# Patient Record
Sex: MALE | Race: WHITE | NOT HISPANIC OR LATINO
[De-identification: names, ages, dates, MRNs, and addresses within clinical notes are randomized per-mention and may not be internally consistent; named-entity substitution may affect disease eponyms.]

---

## 2017-01-17 ENCOUNTER — APPOINTMENT (OUTPATIENT)
Dept: PULMONOLOGY | Facility: CLINIC | Age: 82
End: 2017-01-17

## 2017-02-08 ENCOUNTER — APPOINTMENT (OUTPATIENT)
Dept: OPHTHALMOLOGY | Facility: CLINIC | Age: 82
End: 2017-02-08

## 2017-05-25 ENCOUNTER — APPOINTMENT (OUTPATIENT)
Dept: PULMONOLOGY | Facility: CLINIC | Age: 82
End: 2017-05-25

## 2017-05-25 VITALS
DIASTOLIC BLOOD PRESSURE: 72 MMHG | SYSTOLIC BLOOD PRESSURE: 128 MMHG | HEART RATE: 70 BPM | HEIGHT: 63 IN | WEIGHT: 195 LBS | OXYGEN SATURATION: 96 % | BODY MASS INDEX: 34.55 KG/M2

## 2017-10-24 ENCOUNTER — APPOINTMENT (OUTPATIENT)
Dept: PULMONOLOGY | Facility: CLINIC | Age: 82
End: 2017-10-24
Payer: MEDICARE

## 2017-10-24 VITALS
WEIGHT: 190 LBS | DIASTOLIC BLOOD PRESSURE: 78 MMHG | OXYGEN SATURATION: 91 % | HEIGHT: 63 IN | SYSTOLIC BLOOD PRESSURE: 126 MMHG | BODY MASS INDEX: 33.66 KG/M2 | HEART RATE: 85 BPM

## 2017-10-24 PROCEDURE — 94060 EVALUATION OF WHEEZING: CPT

## 2017-10-24 PROCEDURE — 99214 OFFICE O/P EST MOD 30 MIN: CPT | Mod: 25

## 2017-10-24 PROCEDURE — 94727 GAS DIL/WSHOT DETER LNG VOL: CPT

## 2017-10-24 PROCEDURE — 94729 DIFFUSING CAPACITY: CPT

## 2017-11-30 ENCOUNTER — APPOINTMENT (OUTPATIENT)
Dept: PULMONOLOGY | Facility: CLINIC | Age: 82
End: 2017-11-30

## 2018-05-08 ENCOUNTER — APPOINTMENT (OUTPATIENT)
Dept: PULMONOLOGY | Facility: CLINIC | Age: 83
End: 2018-05-08
Payer: MEDICARE

## 2018-05-08 VITALS
WEIGHT: 195 LBS | DIASTOLIC BLOOD PRESSURE: 70 MMHG | BODY MASS INDEX: 34.54 KG/M2 | HEART RATE: 78 BPM | OXYGEN SATURATION: 88 % | SYSTOLIC BLOOD PRESSURE: 158 MMHG

## 2018-05-08 VITALS — OXYGEN SATURATION: 94 % | HEART RATE: 67 BPM

## 2018-05-08 PROCEDURE — 99214 OFFICE O/P EST MOD 30 MIN: CPT

## 2018-05-20 RX ORDER — TAMSULOSIN HYDROCHLORIDE 0.4 MG/1
0.4 CAPSULE ORAL
Qty: 90 | Refills: 0 | Status: DISCONTINUED | COMMUNITY
Start: 2016-09-22

## 2018-05-20 RX ORDER — CLOTRIMAZOLE AND BETAMETHASONE DIPROPIONATE 10; .5 MG/G; MG/G
1-0.05 CREAM TOPICAL
Qty: 45 | Refills: 0 | Status: DISCONTINUED | COMMUNITY
Start: 2016-06-23

## 2018-05-20 RX ORDER — ATORVASTATIN CALCIUM 80 MG/1
80 TABLET, FILM COATED ORAL
Qty: 90 | Refills: 0 | Status: DISCONTINUED | COMMUNITY
Start: 2016-08-12

## 2018-05-20 RX ORDER — ISOSORBIDE MONONITRATE 30 MG/1
30 TABLET, EXTENDED RELEASE ORAL
Qty: 90 | Refills: 0 | Status: DISCONTINUED | COMMUNITY
Start: 2017-04-01

## 2018-05-20 RX ORDER — CLOPIDOGREL BISULFATE 75 MG/1
75 TABLET, FILM COATED ORAL
Qty: 90 | Refills: 0 | Status: DISCONTINUED | COMMUNITY
Start: 2016-10-24

## 2018-05-20 RX ORDER — LISINOPRIL 2.5 MG/1
2.5 TABLET ORAL
Qty: 90 | Refills: 0 | Status: DISCONTINUED | COMMUNITY
Start: 2016-05-20

## 2019-02-19 ENCOUNTER — APPOINTMENT (OUTPATIENT)
Dept: OPHTHALMOLOGY | Facility: CLINIC | Age: 84
End: 2019-02-19
Payer: MEDICARE

## 2019-02-19 PROCEDURE — 92225: CPT | Mod: RT

## 2019-02-19 PROCEDURE — 92014 COMPRE OPH EXAM EST PT 1/>: CPT

## 2019-02-19 PROCEDURE — 92286 ANT SGM IMG I&R SPECLR MIC: CPT

## 2019-08-09 ENCOUNTER — APPOINTMENT (OUTPATIENT)
Dept: PULMONOLOGY | Facility: CLINIC | Age: 84
End: 2019-08-09
Payer: MEDICARE

## 2019-08-09 ENCOUNTER — FORM ENCOUNTER (OUTPATIENT)
Age: 84
End: 2019-08-09

## 2019-08-09 VITALS — HEART RATE: 78 BPM | OXYGEN SATURATION: 91 %

## 2019-08-09 VITALS — HEART RATE: 58 BPM | OXYGEN SATURATION: 89 %

## 2019-08-09 VITALS
RESPIRATION RATE: 16 BRPM | WEIGHT: 180 LBS | HEART RATE: 82 BPM | OXYGEN SATURATION: 85 % | BODY MASS INDEX: 31.89 KG/M2 | SYSTOLIC BLOOD PRESSURE: 128 MMHG | DIASTOLIC BLOOD PRESSURE: 68 MMHG

## 2019-08-09 PROCEDURE — 99214 OFFICE O/P EST MOD 30 MIN: CPT

## 2019-08-09 RX ORDER — ATORVASTATIN CALCIUM 80 MG/1
80 TABLET, FILM COATED ORAL
Refills: 0 | Status: ACTIVE | COMMUNITY

## 2019-08-09 RX ORDER — LISINOPRIL 2.5 MG/1
2.5 TABLET ORAL
Refills: 0 | Status: ACTIVE | COMMUNITY

## 2019-08-09 RX ORDER — ISOSORBIDE MONONITRATE 30 MG
30 TABLET, EXTENDED RELEASE 24 HR ORAL
Refills: 0 | Status: ACTIVE | COMMUNITY

## 2019-08-09 RX ORDER — FLUTICASONE FUROATE AND VILANTEROL TRIFENATATE 100; 25 UG/1; UG/1
100-25 POWDER RESPIRATORY (INHALATION)
Qty: 1 | Refills: 5 | Status: COMPLETED | COMMUNITY
Start: 2019-05-15 | End: 2019-08-09

## 2019-08-09 RX ORDER — CLOPIDOGREL BISULFATE 75 MG/1
75 TABLET, FILM COATED ORAL
Refills: 0 | Status: ACTIVE | COMMUNITY

## 2019-08-09 RX ORDER — TAMSULOSIN HYDROCHLORIDE 0.4 MG/1
0.4 CAPSULE ORAL
Refills: 0 | Status: ACTIVE | COMMUNITY

## 2019-08-09 RX ORDER — PREDNISONE 20 MG/1
20 TABLET ORAL DAILY
Qty: 5 | Refills: 1 | Status: ACTIVE | COMMUNITY
Start: 2019-08-09 | End: 1900-01-01

## 2019-08-09 RX ORDER — LEVOFLOXACIN 500 MG/1
500 TABLET, FILM COATED ORAL DAILY
Qty: 5 | Refills: 0 | Status: ACTIVE | COMMUNITY
Start: 2019-08-09 | End: 1900-01-01

## 2019-08-09 NOTE — REVIEW OF SYSTEMS
[Cough] : cough [Sputum] : sputum  [Dyspnea] : dyspnea [Hypertension] : ~T hypertension [Difficulty Maintaining Sleep] : difficulty maintaining sleep [Hemoptysis] : no hemoptysis [Fever] : no fever [Pleuritic Pain] : no pleuritic pain [PND] : no PND [Headache] : no headache [Edema] : ~T edema was not present [Diabetes] : no diabetes mellitus

## 2019-08-09 NOTE — PROCEDURE
[FreeTextEntry1] : He is an 89 year-old man with a history of COPD.\par \par He appears to be having an exacerbation of his COPD. He was placed on Levaquin and prednisone 20 mg daily for 5 days. A chest radiograph was requested. Blood work was requested. He is to continue with using oxygen at night and during the day with exertion.\par \par Followup in a few weeks suggested.

## 2019-08-09 NOTE — PHYSICAL EXAM
[General Appearance - In No Acute Distress] : no acute distress [Neck Cervical Mass (___cm)] : no neck mass was observed [Exaggerated Use Of Accessory Muscles For Inspiration] : no accessory muscle use [Heart Sounds] : normal S1 and S2 [Abnormal Walk] : normal gait [No Focal Deficits] : no focal deficits [] : no rash [Cyanosis, Localized] : no localized cyanosis [Oriented To Time, Place, And Person] : oriented to person, place, and time [FreeTextEntry1] : Rhonchi more on the left than the right [Erythema] : no erythema of the pharynx

## 2019-08-09 NOTE — HISTORY OF PRESENT ILLNESS
[FreeTextEntry1] : 89-year-old man with COPD. Presenting with cough and chest congestion for the past two days. No fever, chills or sweating. No sick contacts. No chest pain or pressure. Cough is productive of yellow phlegm. He is using oxygen at night.

## 2019-08-10 ENCOUNTER — OUTPATIENT (OUTPATIENT)
Dept: OUTPATIENT SERVICES | Facility: HOSPITAL | Age: 84
LOS: 1 days | End: 2019-08-10
Payer: SELF-PAY

## 2019-08-10 ENCOUNTER — APPOINTMENT (OUTPATIENT)
Dept: CT IMAGING | Facility: IMAGING CENTER | Age: 84
End: 2019-08-10
Payer: SELF-PAY

## 2019-08-10 DIAGNOSIS — Z95.5 PRESENCE OF CORONARY ANGIOPLASTY IMPLANT AND GRAFT: Chronic | ICD-10-CM

## 2019-08-10 DIAGNOSIS — R06.09 OTHER FORMS OF DYSPNEA: ICD-10-CM

## 2019-08-10 DIAGNOSIS — J44.9 CHRONIC OBSTRUCTIVE PULMONARY DISEASE, UNSPECIFIED: ICD-10-CM

## 2019-08-10 PROCEDURE — 71250 CT THORAX DX C-: CPT | Mod: 26

## 2019-08-10 PROCEDURE — 71250 CT THORAX DX C-: CPT

## 2019-08-27 ENCOUNTER — APPOINTMENT (OUTPATIENT)
Dept: PULMONOLOGY | Facility: CLINIC | Age: 84
End: 2019-08-27

## 2021-12-14 ENCOUNTER — APPOINTMENT (OUTPATIENT)
Dept: PULMONOLOGY | Facility: CLINIC | Age: 86
End: 2021-12-14
Payer: MEDICARE

## 2021-12-14 VITALS
TEMPERATURE: 97.1 F | OXYGEN SATURATION: 85 % | SYSTOLIC BLOOD PRESSURE: 126 MMHG | BODY MASS INDEX: 30.47 KG/M2 | HEART RATE: 89 BPM | WEIGHT: 172 LBS | DIASTOLIC BLOOD PRESSURE: 60 MMHG

## 2021-12-14 VITALS — HEART RATE: 81 BPM | OXYGEN SATURATION: 91 %

## 2021-12-14 DIAGNOSIS — R05.9 COUGH, UNSPECIFIED: ICD-10-CM

## 2021-12-14 DIAGNOSIS — R06.00 DYSPNEA, UNSPECIFIED: ICD-10-CM

## 2021-12-14 PROCEDURE — 99214 OFFICE O/P EST MOD 30 MIN: CPT

## 2021-12-14 RX ORDER — TIOTROPIUM BROMIDE 18 UG/1
18 CAPSULE ORAL; RESPIRATORY (INHALATION) DAILY
Qty: 1 | Refills: 3 | Status: ACTIVE | COMMUNITY
Start: 2019-05-15 | End: 1900-01-01

## 2021-12-14 RX ORDER — FLUTICASONE PROPIONATE AND SALMETEROL 250; 50 UG/1; UG/1
250-50 POWDER RESPIRATORY (INHALATION)
Qty: 3 | Refills: 3 | Status: ACTIVE | COMMUNITY
Start: 2020-03-30 | End: 1900-01-01

## 2021-12-14 RX ORDER — ALBUTEROL SULFATE 90 UG/1
108 (90 BASE) INHALANT RESPIRATORY (INHALATION)
Qty: 3 | Refills: 1 | Status: ACTIVE | COMMUNITY
Start: 2020-05-26 | End: 1900-01-01

## 2021-12-29 PROBLEM — R06.00 DOE (DYSPNEA ON EXERTION): Status: ACTIVE | Noted: 2017-05-25

## 2021-12-29 NOTE — HISTORY OF PRESENT ILLNESS
[Former] : former [>= 30 pack years] : >= 30 pack years [TextBox_4] : 89 yo male with hx of COPD, last seen by me May 2018, presents for follow up. The patient complains of HENDERSON without significant cough or chest pain. He uses wixela BID, spiriva daily and rare albuterol MDI use. He uses oxygen with sleep alone. His oxygen saturation drops with ambulation when checked at home. He is covid 19 and influenza vaccinated. [TextBox_29] : Denies snoring, daytime somnolence, apneic episodes, AM headaches

## 2021-12-29 NOTE — DISCUSSION/SUMMARY
[FreeTextEntry1] : 89 yo male with OAD related complaints on ICS/LABA/LAMA and nocturnal oxygen. Portable oxygen use recommended. He is to follow up with his PMD as before. His daughter was present.

## 2022-12-20 ENCOUNTER — APPOINTMENT (OUTPATIENT)
Dept: PULMONOLOGY | Facility: CLINIC | Age: 87
End: 2022-12-20

## 2022-12-20 VITALS — OXYGEN SATURATION: 97 % | TEMPERATURE: 97.8 F | HEART RATE: 78 BPM

## 2022-12-20 DIAGNOSIS — J44.9 CHRONIC OBSTRUCTIVE PULMONARY DISEASE, UNSPECIFIED: ICD-10-CM

## 2022-12-20 DIAGNOSIS — U09.9 POST COVID-19 CONDITION, UNSPECIFIED: ICD-10-CM

## 2022-12-20 PROCEDURE — 99214 OFFICE O/P EST MOD 30 MIN: CPT

## 2022-12-20 NOTE — CONSULT LETTER
[Dear  ___] : Dear  [unfilled], [Courtesy Letter:] : I had the pleasure of seeing your patient, [unfilled], in my office today. [Please see my note below.] : Please see my note below. [Consult Closing:] : Thank you very much for allowing me to participate in the care of this patient.  If you have any questions, please do not hesitate to contact me. [Sincerely,] : Sincerely, negative Regular rate & rhythm, normal S1, S2; no murmurs, gallops or rubs; no S3, S4

## 2023-01-12 RX ORDER — TIOTROPIUM BROMIDE 18 UG/1
18 CAPSULE ORAL; RESPIRATORY (INHALATION) DAILY
Qty: 90 | Refills: 3 | Status: ACTIVE | COMMUNITY
Start: 2023-01-12 | End: 1900-01-01

## 2023-01-12 RX ORDER — FLUTICASONE PROPIONATE AND SALMETEROL 250; 50 UG/1; UG/1
250-50 POWDER RESPIRATORY (INHALATION)
Qty: 3 | Refills: 3 | Status: ACTIVE | COMMUNITY
Start: 2023-01-12 | End: 1900-01-01

## 2023-05-23 NOTE — DISCUSSION/SUMMARY
[FreeTextEntry1] : 89 yo male with severe COPD post covid 19 infection on continuous supplemental oxygen. Will attempt to secure [employer] to allow for easier ambulation. He is clearly in need of home care and PT. Continued use of ICS/LABA/LAMA as before. His resting room air pulse oximetry was 86%. His son and daughter were present.

## 2023-05-23 NOTE — HISTORY OF PRESENT ILLNESS
[Former] : former [>= 20 pack years] : >= 20 pack years [TextBox_4] : 89 yo male with hx of COPD presents for follow up.The patient was covid 19 positive one month ago, admitted to [facility] requiring high flow oxygen. Since discharge he has been on continuous supplemental oxygen. He denies fever,cough or chest pain but is very weak with HENDERSON. He is on BID wixela and daily spiriva with PRN albuterol MDI. [TextBox_29] : Denies snoring, daytime somnolence, apneic episodes, AM headaches

## 2023-05-23 NOTE — PHYSICAL EXAM
[No Acute Distress] : no acute distress [Normal Oropharynx] : normal oropharynx [Normal Appearance] : normal appearance [No Neck Mass] : no neck mass [Normal Rate/Rhythm] : normal rate/rhythm [Normal S1, S2] : normal s1, s2 [No Murmurs] : no murmurs [No Resp Distress] : no resp distress [Rales] : rales [No Abnormalities] : no abnormalities [Benign] : benign [No Clubbing] : no clubbing [No Cyanosis] : no cyanosis [No Edema] : no edema [FROM] : FROM [Normal Color/ Pigmentation] : normal color/ pigmentation [No Focal Deficits] : no focal deficits [Oriented x3] : oriented x3 [Normal Affect] : normal affect [TextBox_99] : In wheelchair

## 2024-09-25 ENCOUNTER — INPATIENT (INPATIENT)
Facility: HOSPITAL | Age: 89
LOS: 20 days | Discharge: NURSING, REHAB OR LONG-TERM CARE | End: 2024-10-16
Attending: INTERNAL MEDICINE | Admitting: INTERNAL MEDICINE
Payer: MEDICARE

## 2024-09-25 VITALS
OXYGEN SATURATION: 88 % | HEART RATE: 88 BPM | RESPIRATION RATE: 18 BRPM | TEMPERATURE: 98 F | DIASTOLIC BLOOD PRESSURE: 77 MMHG | SYSTOLIC BLOOD PRESSURE: 109 MMHG

## 2024-09-25 DIAGNOSIS — D72.829 ELEVATED WHITE BLOOD CELL COUNT, UNSPECIFIED: ICD-10-CM

## 2024-09-25 DIAGNOSIS — I25.10 ATHEROSCLEROTIC HEART DISEASE OF NATIVE CORONARY ARTERY WITHOUT ANGINA PECTORIS: ICD-10-CM

## 2024-09-25 DIAGNOSIS — Z29.9 ENCOUNTER FOR PROPHYLACTIC MEASURES, UNSPECIFIED: ICD-10-CM

## 2024-09-25 DIAGNOSIS — J96.11 CHRONIC RESPIRATORY FAILURE WITH HYPOXIA: ICD-10-CM

## 2024-09-25 DIAGNOSIS — N40.0 BENIGN PROSTATIC HYPERPLASIA WITHOUT LOWER URINARY TRACT SYMPTOMS: ICD-10-CM

## 2024-09-25 DIAGNOSIS — G93.41 METABOLIC ENCEPHALOPATHY: ICD-10-CM

## 2024-09-25 DIAGNOSIS — R45.1 RESTLESSNESS AND AGITATION: ICD-10-CM

## 2024-09-25 DIAGNOSIS — J44.9 CHRONIC OBSTRUCTIVE PULMONARY DISEASE, UNSPECIFIED: ICD-10-CM

## 2024-09-25 LAB
ADD ON TEST-SPECIMEN IN LAB: SIGNIFICANT CHANGE UP
ALBUMIN SERPL ELPH-MCNC: 3.7 G/DL — SIGNIFICANT CHANGE UP (ref 3.3–5)
ALP SERPL-CCNC: 85 U/L — SIGNIFICANT CHANGE UP (ref 40–120)
ALT FLD-CCNC: 13 U/L — SIGNIFICANT CHANGE UP (ref 4–41)
ANION GAP SERPL CALC-SCNC: 12 MMOL/L — SIGNIFICANT CHANGE UP (ref 7–14)
APPEARANCE UR: CLEAR — SIGNIFICANT CHANGE UP
AST SERPL-CCNC: 14 U/L — SIGNIFICANT CHANGE UP (ref 4–40)
BASOPHILS # BLD AUTO: 0.06 K/UL — SIGNIFICANT CHANGE UP (ref 0–0.2)
BASOPHILS NFR BLD AUTO: 0.5 % — SIGNIFICANT CHANGE UP (ref 0–2)
BILIRUB SERPL-MCNC: 0.6 MG/DL — SIGNIFICANT CHANGE UP (ref 0.2–1.2)
BILIRUB UR-MCNC: NEGATIVE — SIGNIFICANT CHANGE UP
BUN SERPL-MCNC: 15 MG/DL — SIGNIFICANT CHANGE UP (ref 7–23)
CALCIUM SERPL-MCNC: 9.4 MG/DL — SIGNIFICANT CHANGE UP (ref 8.4–10.5)
CHLORIDE SERPL-SCNC: 102 MMOL/L — SIGNIFICANT CHANGE UP (ref 98–107)
CO2 SERPL-SCNC: 26 MMOL/L — SIGNIFICANT CHANGE UP (ref 22–31)
COLOR SPEC: YELLOW — SIGNIFICANT CHANGE UP
CREAT SERPL-MCNC: 0.84 MG/DL — SIGNIFICANT CHANGE UP (ref 0.5–1.3)
DIFF PNL FLD: ABNORMAL
EGFR: 81 ML/MIN/1.73M2 — SIGNIFICANT CHANGE UP
EOSINOPHIL # BLD AUTO: 0.07 K/UL — SIGNIFICANT CHANGE UP (ref 0–0.5)
EOSINOPHIL NFR BLD AUTO: 0.6 % — SIGNIFICANT CHANGE UP (ref 0–6)
GAS PNL BLDV: SIGNIFICANT CHANGE UP
GLUCOSE SERPL-MCNC: 125 MG/DL — HIGH (ref 70–99)
GLUCOSE UR QL: NEGATIVE MG/DL — SIGNIFICANT CHANGE UP
HCT VFR BLD CALC: 44.7 % — SIGNIFICANT CHANGE UP (ref 39–50)
HGB BLD-MCNC: 14.8 G/DL — SIGNIFICANT CHANGE UP (ref 13–17)
IANC: 9.18 K/UL — HIGH (ref 1.8–7.4)
IMM GRANULOCYTES NFR BLD AUTO: 1 % — HIGH (ref 0–0.9)
KETONES UR-MCNC: NEGATIVE MG/DL — SIGNIFICANT CHANGE UP
LEUKOCYTE ESTERASE UR-ACNC: NEGATIVE — SIGNIFICANT CHANGE UP
LYMPHOCYTES # BLD AUTO: 1.97 K/UL — SIGNIFICANT CHANGE UP (ref 1–3.3)
LYMPHOCYTES # BLD AUTO: 16.1 % — SIGNIFICANT CHANGE UP (ref 13–44)
MAGNESIUM SERPL-MCNC: 2.1 MG/DL — SIGNIFICANT CHANGE UP (ref 1.6–2.6)
MCHC RBC-ENTMCNC: 31 PG — SIGNIFICANT CHANGE UP (ref 27–34)
MCHC RBC-ENTMCNC: 33.1 GM/DL — SIGNIFICANT CHANGE UP (ref 32–36)
MCV RBC AUTO: 93.7 FL — SIGNIFICANT CHANGE UP (ref 80–100)
MONOCYTES # BLD AUTO: 0.81 K/UL — SIGNIFICANT CHANGE UP (ref 0–0.9)
MONOCYTES NFR BLD AUTO: 6.6 % — SIGNIFICANT CHANGE UP (ref 2–14)
NEUTROPHILS # BLD AUTO: 9.18 K/UL — HIGH (ref 1.8–7.4)
NEUTROPHILS NFR BLD AUTO: 75.2 % — SIGNIFICANT CHANGE UP (ref 43–77)
NITRITE UR-MCNC: NEGATIVE — SIGNIFICANT CHANGE UP
NRBC # BLD: 0 /100 WBCS — SIGNIFICANT CHANGE UP (ref 0–0)
NRBC # FLD: 0 K/UL — SIGNIFICANT CHANGE UP (ref 0–0)
NT-PROBNP SERPL-SCNC: 3351 PG/ML — HIGH
PH UR: 6 — SIGNIFICANT CHANGE UP (ref 5–8)
PHOSPHATE SERPL-MCNC: 3.4 MG/DL — SIGNIFICANT CHANGE UP (ref 2.5–4.5)
PLATELET # BLD AUTO: 262 K/UL — SIGNIFICANT CHANGE UP (ref 150–400)
POTASSIUM SERPL-MCNC: 5 MMOL/L — SIGNIFICANT CHANGE UP (ref 3.5–5.3)
POTASSIUM SERPL-SCNC: 5 MMOL/L — SIGNIFICANT CHANGE UP (ref 3.5–5.3)
PROCALCITONIN SERPL-MCNC: 0.04 NG/ML — SIGNIFICANT CHANGE UP (ref 0.02–0.1)
PROT SERPL-MCNC: 7 G/DL — SIGNIFICANT CHANGE UP (ref 6–8.3)
PROT UR-MCNC: SIGNIFICANT CHANGE UP MG/DL
RBC # BLD: 4.77 M/UL — SIGNIFICANT CHANGE UP (ref 4.2–5.8)
RBC # FLD: 16 % — HIGH (ref 10.3–14.5)
SODIUM SERPL-SCNC: 140 MMOL/L — SIGNIFICANT CHANGE UP (ref 135–145)
SP GR SPEC: 1.02 — SIGNIFICANT CHANGE UP (ref 1–1.03)
TROPONIN T, HIGH SENSITIVITY RESULT: 33 NG/L — SIGNIFICANT CHANGE UP
TROPONIN T, HIGH SENSITIVITY RESULT: 33 NG/L — SIGNIFICANT CHANGE UP
UROBILINOGEN FLD QL: 0.2 MG/DL — SIGNIFICANT CHANGE UP (ref 0.2–1)
WBC # BLD: 12.21 K/UL — HIGH (ref 3.8–10.5)
WBC # FLD AUTO: 12.21 K/UL — HIGH (ref 3.8–10.5)

## 2024-09-25 PROCEDURE — 99285 EMERGENCY DEPT VISIT HI MDM: CPT

## 2024-09-25 PROCEDURE — 71045 X-RAY EXAM CHEST 1 VIEW: CPT | Mod: 26

## 2024-09-25 PROCEDURE — 99223 1ST HOSP IP/OBS HIGH 75: CPT

## 2024-09-25 RX ORDER — IPRATROPIUM BROMIDE AND ALBUTEROL SULFATE .5; 3 MG/3ML; MG/3ML
3 SOLUTION RESPIRATORY (INHALATION) EVERY 6 HOURS
Refills: 0 | Status: DISCONTINUED | OUTPATIENT
Start: 2024-09-25 | End: 2024-10-16

## 2024-09-25 RX ORDER — FINASTERIDE 5 MG/1
1 TABLET, FILM COATED ORAL
Refills: 0 | DISCHARGE

## 2024-09-25 RX ORDER — SIMVASTATIN 20 MG
1 TABLET ORAL
Refills: 0 | DISCHARGE

## 2024-09-25 RX ORDER — FUROSEMIDE 10 MG/ML
40 INJECTION INTRAVENOUS ONCE
Refills: 0 | Status: DISCONTINUED | OUTPATIENT
Start: 2024-09-25 | End: 2024-10-10

## 2024-09-25 RX ORDER — MAG HYDROX/ALUMINUM HYD/SIMETH 200-200-20
30 SUSPENSION, ORAL (FINAL DOSE FORM) ORAL EVERY 4 HOURS
Refills: 0 | Status: DISCONTINUED | OUTPATIENT
Start: 2024-09-25 | End: 2024-10-16

## 2024-09-25 RX ORDER — PREDNISONE 5 MG/1
1 TABLET ORAL
Refills: 0 | DISCHARGE

## 2024-09-25 RX ORDER — PREDNISONE 5 MG/1
10 TABLET ORAL DAILY
Refills: 0 | Status: DISCONTINUED | OUTPATIENT
Start: 2024-09-25 | End: 2024-10-16

## 2024-09-25 RX ORDER — ONDANSETRON HCL/PF 4 MG/2 ML
4 VIAL (ML) INJECTION EVERY 8 HOURS
Refills: 0 | Status: DISCONTINUED | OUTPATIENT
Start: 2024-09-25 | End: 2024-10-16

## 2024-09-25 RX ORDER — FINASTERIDE 5 MG/1
5 TABLET, FILM COATED ORAL DAILY
Refills: 0 | Status: DISCONTINUED | OUTPATIENT
Start: 2024-09-25 | End: 2024-10-16

## 2024-09-25 RX ORDER — MULTI VITAMIN/MINERAL SUPPLEMENT WITH ASCORBIC ACID, NIACIN, PYRIDOXINE, PANTOTHENIC ACID, FOLIC ACID, RIBOFLAVIN, THIAMIN, BIOTIN, COBALAMIN AND ZINC. 60; 20; 12.5; 10; 10; 1.7; 1.5; 1; .3; .006 MG/1; MG/1; MG/1; MG/1; MG/1; MG/1; MG/1; MG/1; MG/1; MG/1
1 TABLET, COATED ORAL
Refills: 0 | DISCHARGE

## 2024-09-25 RX ORDER — DOCUSATE SODIUM 100 MG
2 CAPSULE ORAL
Refills: 0 | DISCHARGE

## 2024-09-25 RX ORDER — CEFTRIAXONE SODIUM 1 G
1000 VIAL (EA) INJECTION ONCE
Refills: 0 | Status: COMPLETED | OUTPATIENT
Start: 2024-09-25 | End: 2024-09-25

## 2024-09-25 RX ORDER — TAMSULOSIN HCL 0.4 MG
1 CAPSULE ORAL
Refills: 0 | DISCHARGE

## 2024-09-25 RX ORDER — ENOXAPARIN SODIUM 150 MG/ML
40 INJECTION SUBCUTANEOUS EVERY 24 HOURS
Refills: 0 | Status: DISCONTINUED | OUTPATIENT
Start: 2024-09-25 | End: 2024-10-16

## 2024-09-25 RX ORDER — AZITHROMYCIN 250 MG/1
500 TABLET, FILM COATED ORAL ONCE
Refills: 0 | Status: COMPLETED | OUTPATIENT
Start: 2024-09-25 | End: 2024-09-25

## 2024-09-25 RX ORDER — SENNOSIDES 8.6 MG
2 TABLET ORAL AT BEDTIME
Refills: 0 | Status: DISCONTINUED | OUTPATIENT
Start: 2024-09-25 | End: 2024-10-16

## 2024-09-25 RX ORDER — AZITHROMYCIN 250 MG/1
500 TABLET, FILM COATED ORAL EVERY 24 HOURS
Refills: 0 | Status: COMPLETED | OUTPATIENT
Start: 2024-09-26 | End: 2024-09-27

## 2024-09-25 RX ORDER — CEFTRIAXONE SODIUM 1 G
1000 VIAL (EA) INJECTION EVERY 24 HOURS
Refills: 0 | Status: COMPLETED | OUTPATIENT
Start: 2024-09-26 | End: 2024-09-29

## 2024-09-25 RX ORDER — TAMSULOSIN HCL 0.4 MG
0.4 CAPSULE ORAL AT BEDTIME
Refills: 0 | Status: DISCONTINUED | OUTPATIENT
Start: 2024-09-25 | End: 2024-10-16

## 2024-09-25 RX ORDER — ATORVASTATIN CALCIUM 10 MG/1
10 TABLET, FILM COATED ORAL AT BEDTIME
Refills: 0 | Status: DISCONTINUED | OUTPATIENT
Start: 2024-09-25 | End: 2024-10-16

## 2024-09-25 RX ORDER — CRANBERRY FRUIT EXTRACT 650 MG
1 CAPSULE ORAL
Refills: 0 | DISCHARGE

## 2024-09-25 RX ORDER — ACETAMINOPHEN 325 MG
650 TABLET ORAL EVERY 6 HOURS
Refills: 0 | Status: DISCONTINUED | OUTPATIENT
Start: 2024-09-25 | End: 2024-10-16

## 2024-09-25 RX ORDER — IPRATROPIUM BROMIDE AND ALBUTEROL SULFATE .5; 3 MG/3ML; MG/3ML
3 SOLUTION RESPIRATORY (INHALATION) ONCE
Refills: 0 | Status: COMPLETED | OUTPATIENT
Start: 2024-09-25 | End: 2024-09-25

## 2024-09-25 RX ORDER — 5-HYDROXYTRYPTOPHAN (5-HTP) 100 MG
3 TABLET,DISINTEGRATING ORAL AT BEDTIME
Refills: 0 | Status: DISCONTINUED | OUTPATIENT
Start: 2024-09-25 | End: 2024-10-16

## 2024-09-25 RX ADMIN — Medication 100 MILLIGRAM(S): at 20:43

## 2024-09-25 RX ADMIN — AZITHROMYCIN 255 MILLIGRAM(S): 250 TABLET, FILM COATED ORAL at 22:22

## 2024-09-25 NOTE — ED ADULT NURSE REASSESSMENT NOTE - NS ED NURSE REASSESS COMMENT FT1
Pt placed on nc and sating in the 90s. Pt continues to be combative and ranting about his property. Pt not in acute distress at this time. Plan of care ongoing, safety maintained.
Per son at bedside pt is normally A and Ox4 and ambulatory. Pt continues to be A and Ox1, PA made aware. Pt's oxygen titrated down to 4L NC (pts baseline per family) and PA made aware. Airway is patent, respirations are even and unlabored. Plan of care ongoing, safety maintained.
Pt resting in room 2. No mental status change at this time. VS as noted in the flow sheet. Airway is patent, respirations are even and unlabored. Plan of care ongoing, safety maintained.

## 2024-09-25 NOTE — ED PROVIDER NOTE - PROGRESS NOTE DETAILS
multiple attempts made to contact sending facility no success talking with anyone who knows about patient. Attending MD lamin Helms PA-C discussed with son at bedside patient had a episode similar to this on monday that self resolved tuesday . son visted patient last night found to be at baseline. Attending MD aware and agrees with plan Kwaku Helms PA-C

## 2024-09-25 NOTE — H&P ADULT - HISTORY OF PRESENT ILLNESS
89M with PMH cognitive dysfunction (AAOx2 at baseline), advanced COPD on daily prednisone with chronic respiratory failure on 4L NC, CAD/MI s/p stent, HLD, and BPH BIBEMS from [facility] for confusion in setting of suspected pneumonia. Pt does not know why he was brought to the hospital, unable to provide much history. Collateral obtained from daughter/HCP Meghan Gonzalez. Pt noted to have intermittent episodes of confusion by NH staff over the past 2 days, occasionally become agitated, saying nonsensical things, all unlike him. Pt is AAOx2 at baseline, would not know the year, but otherwise conversational. Whenever he was visited by daughter or son though, he was found to be at his baseline. He had a UA done that was unremarkable and a CXR that showed "pulmonary vascular congestion with patchy bibasilar infriltrates"- copy in pt's chart. And he was started on doxycycline yesterday, unclear how many doses he took. Pt with a chronic cough, did not notice anything like increased cough or sputum production from baseline. No witnessed episodes of shortness of breath, no increase in baseline oxygen requirement. No known fevers. Meghan believes patient has a history of heart failure and takes lasix regularly, however, heart failure not listed as a diagnosis from NH or [facility] outpatient pulm notes, lasix also not listed on medication list from NH or surescripts.   Pt himself currently feels well. Denies having any trouble breathing, chest pain, abdominal pain, or any other complaints.     In ED:  On arrivalL afebrile, hemodynamically stable, saturating 88% on 4L NC, briefly required 6L but now weaned back down to 4%, saturating >90%.   Labs notable for leukocytosis to 12k, elevated proBNP 3351, VBG with pH 7.37/50, lactate 2.6  UA negative for infection  CXR with reticular opacities over bibasilar lungs on prelim read    Given CTX and azithromycin.   Admitted to medicine for further evaluation.

## 2024-09-25 NOTE — ED PROVIDER NOTE - ATTENDING APP SHARED VISIT CONTRIBUTION OF CARE
I performed a face-to-face evaluation of the patient and performed a history and physical examination. I agree with the history and physical examination. If this was a PA visit, I personally saw the patient with the PA and performed a substantive portion of the visit including all aspects of the medical decision making.    Chronic hypoxia and hypercarbia 2/2 COPD. Sent from nursing home for agitation. Check labs, UA, EKG, VBG (? hypercarbia). No indication for CT brain: no HA, is moving all extremities well, BP not markedly-elevated, and, given respiratory condition, risks of sedation outweight potential benefits, as no neurosurgical intervention would be offered given his advanced age and dementia. May require admission for agitation control.

## 2024-09-25 NOTE — H&P ADULT - PROBLEM SELECTOR PLAN 1
AAOx2 (name, location) at baseline p/w intermittent episodes of confusion, likely delirium in setting of infection vs pulmonary edema. AAOx2 on admission   - without any focal deficits to suggest structural etiology  - treatment for infection vs edema as below   - frequent reorientation  - maintain normal sleep/wake cycles (avoid waking patient between 11PM and 6AM unless medically necessary)

## 2024-09-25 NOTE — H&P ADULT - NSICDXPASTMEDICALHX_GEN_ALL_CORE_FT
PAST MEDICAL HISTORY:  Advanced COPD     BPH (benign prostatic hyperplasia)     CAD (coronary artery disease)     Chronic cough     Chronic respiratory failure with hypoxia     History of myocardial infarction     HLD (hyperlipidemia)

## 2024-09-25 NOTE — H&P ADULT - PROBLEM SELECTOR PLAN 3
low suspicion for COPD exacerbation, lung exam with localized rhonchi/coarse crackles over R>L base. No increased baseline O2 requirement or worsening of chronic cough  - continue home prednisone 10mg daily   - duonebs PRN

## 2024-09-25 NOTE — H&P ADULT - NSHPPHYSICALEXAM_GEN_ALL_CORE
VITALS:   Vital Signs Last 24 Hrs  T(C): 36.4 (25 Sep 2024 22:22), Max: 37.1 (25 Sep 2024 20:00)  T(F): 97.6 (25 Sep 2024 22:22), Max: 98.8 (25 Sep 2024 20:00)  HR: 80 (25 Sep 2024 22:22) (73 - 88)  BP: 122/65 (25 Sep 2024 22:22) (109/77 - 132/69)  BP(mean): --  RR: 20 (25 Sep 2024 22:22) (18 - 20)  SpO2: 91% (25 Sep 2024 22:22) (88% - 94%)    Parameters below as of 25 Sep 2024 22:22  Patient On (Oxygen Delivery Method): nasal cannula  O2 Flow (L/min): 4    I&O's Summary    CAPILLARY BLOOD GLUCOSE    Physical Exam:  General: NAD, non-toxic appearing   HEENT: PERRL, EOMi, no scleral icterus  CV: RRR, normal S1 and S2  Lungs: normal respiratory effort on 4L NC, +rhonchi and coarse crackles over R base, scant coarse crackles over L base   Abd: soft, nontender, nondistended  Ext: no edema, 2+ peripheral pulses   Pysch: AAOx2 (to name, hospital), appropriate affect   Neuro: grossly non-focal, moving all extremities spontaneously   Skin: no rashes or lesions

## 2024-09-25 NOTE — ED PROVIDER NOTE - OBJECTIVE STATEMENT
89 year old male with pmhx copd, chronic resp failure with hypoxia on o2, additional limited due to ams presents to the ED from nursing home for ams. patient appears to have gotten agitated to staff at facility and sent to ED. patient had cxr yesterday dx with pna and started on doxycycline. patient uncooperative and not answering questions. patient not known to have ams. patient reporting no headache, additional limited due to ams.

## 2024-09-25 NOTE — ED ADULT NURSE REASSESSMENT NOTE - STATUS
awaits dipso as per handoff rpt Pt intially arrived to ED from Nursing home sent for altered mental status hypoxia. Pt is oxygen dependent. hx of COPD recent pneumonia.

## 2024-09-25 NOTE — ED ADULT NURSE NOTE - NSFALLHARMRISKINTERV_ED_ALL_ED
Assistance OOB with selected safe patient handling equipment if applicable/Assistance with ambulation/Communicate risk of Fall with Harm to all staff, patient, and family/Monitor gait and stability/Monitor for mental status changes and reorient to person, place, and time, as needed/Move patient closer to nursing station/within visual sight of ED staff/Provide patient with walking aids/Provide visual cue: red socks, yellow wristband, yellow gown, etc/Reinforce activity limits and safety measures with patient and family/Toileting schedule using arm’s reach rule for commode and bathroom/Use of alarms - bed, stretcher, chair and/or video monitoring/Bed in lowest position, wheels locked, appropriate side rails in place/Call bell, personal items and telephone in reach/Instruct patient to call for assistance before getting out of bed/chair/stretcher/Non-slip footwear applied when patient is off stretcher/[place] to call system/Physically safe environment - no spills, clutter or unnecessary equipment/Purposeful Proactive Rounding/Room/bathroom lighting operational, light cord in reach

## 2024-09-25 NOTE — H&P ADULT - PROBLEM SELECTOR PLAN 2
Detail Level: Zone
Hide Additional Notes?: No
mild leukocytosis to 12k on admission, infectious vs reactive. afebrile, not septic, hemodynamically stable. Without any new fevers, URI sxs, worsening of chronic cough, or increased O2 requirements. Procal negative. Favoring pulmonary edema over pneumonia at this time at this time   - CXR on admission with bibasilar reticular opacities on prelim, ddx broad, potentially pulmonary edema vs infection vs underlying interstitial lung disease   - UA negative for infection     - per daughter, pt was previously put on lasix for suspected HF, however, no documented records, f/u in AM  - for now can give lasix 40mg PO x1, no need for IV given at baseline O2 level   - s/p CTX and azithromycin in ED. can continue empiric pneumonia treatment for now  - f/u final CXR report   - f/u covid/flu/RSV, MRSA swab, legionella/strep urine Ag's, blood cxs  - mildly elevated lactated 2.6 on admission, f/u repeat in AM

## 2024-09-25 NOTE — ED ADULT NURSE REASSESSMENT NOTE - COMFORT CARE
Son remains at bedside.Requesting MD to discuss treatment plan... Resident Tesha at bedside to discuss plan to intiate antiobiotic r/o pneumonia/plan of care explained
plan of care explained/warm blanket provided

## 2024-09-25 NOTE — H&P ADULT - PROBLEM SELECTOR PLAN 7
Diet: soft, bite sized, DASH  DVT ppx: lovenox SQ  Dispo: pending clinical course; from [facility]; PT eval  Code Status: DNR/DNI. Photocopy of MOLST and HCP form from NH in chart reviewed and confirmed with daughter/HCP Meghan Gonzalez over phone.

## 2024-09-25 NOTE — ED ADULT NURSE REASSESSMENT NOTE - ANCILLARY STATUS
BC x2/lab results pending
lactate 2.6, WBC 12.2/lab results pending/awaiting radiology/radiology results pending

## 2024-09-25 NOTE — ED ADULT NURSE NOTE - OBJECTIVE STATEMENT
Pt arrives to room 2. Pt is A and Ox1 at this time. Pt arrives to room 2 on 4L NC sating 77%. Pt placed on non- rebreather. Pt is agitated and restless stating, "you are trying to steal my property. Pt arrives to room 2. Pt is A and Ox1 at this time. Pt arrives to room 2 on 4L NC sating 77%. Pt placed on non- rebreather. Pt is agitated and restless stating, "you are trying to steal my property and my family will come kill you all." Pt does not appear to be in pain or short of breath. Airway is patent. Pt placed on cardiac monitor. IV placed labs sent per provider orders. Plan of care ongoing, safety maintained.

## 2024-09-25 NOTE — ED ADULT TRIAGE NOTE - CHIEF COMPLAINT QUOTE
Pt from [facility]  sent in for increased agitation. pt 02 dependent  Pts 02 sat 885 on 4liters. pt with no fever  b/p wnl.

## 2024-09-25 NOTE — H&P ADULT - ASSESSMENT
89M with PMH cognitive dysfunction (AAOx2 at baseline), advanced COPD on daily prednisone with chronic respiratory failure on 4L NC, CAD/MI s/p stent, HLD, and BPH BIBEMS from [facility] for confusion x 2 days. Chest imaging suggestive of pulmonary edema vs pneumonia.

## 2024-09-25 NOTE — ED PROVIDER NOTE - PHYSICAL EXAMINATION
Well-appearing, well nourished, awake, agitated, in no apparent physical distress.    Airway patent. Neck supple.    Eyes without scleral injection. No jaundice.    Strong pulse.    Respirations unlabored. Mild rhonchi and crackles.    Abdomen soft, non-tender, no guarding.    MSK: Spine appears normal, range of motion is not limited, no muscle or joint tenderness. No LE edema.     Alert and oriented, no gross motor or sensory deficits.    Skin: normal color for race, warm, dry and intact. No evidence of rash.    No SI/HI.

## 2024-09-25 NOTE — ED PROVIDER NOTE - CLINICAL SUMMARY MEDICAL DECISION MAKING FREE TEXT BOX
Barbie: Chronic hypoxia and hypercarbia 2/2 COPD. Sent from nursing home for agitation. Check labs, UA, EKG, VBG (? hypercarbia). No indication for CT brain: no HA, is moving all extremities well, BP not markedly-elevated, and, given respiratory condition, risks of sedation outweight potential benefits, as no neurosurgical intervention would be offered given his advanced age and dementia. May require admission for agitation control. Give supplemental O2 (uses at nursing home).

## 2024-09-25 NOTE — ED ADULT NURSE REASSESSMENT NOTE - GENERAL PATIENT STATE
Pt received on cm oxygen 4 liters pox 92-93%, Resp even unlabored. Pt is calm cooperative but remains disoriented. As per discussion with Provider Kwaku this is baseline. Discussed treatment plan with TAYLOR Ames regarding Blood cultures antibiotics , fluids ? orders to follow will discuss with Attending.MD./comfortable appearance/family/SO at bedside/resting/sleeping

## 2024-09-25 NOTE — ED ADULT NURSE NOTE - CHIEF COMPLAINT QUOTE
Pt from [facility]  sent in for increased agitation. pt 02 dependent  Pts 02 sat 885 on 4liters. pt with no fever  b/p wnl.

## 2024-09-25 NOTE — H&P ADULT - NSHPLABSRESULTS_GEN_ALL_CORE
.  LABS:                         14.8   12.21 )-----------( 262      ( 25 Sep 2024 14:44 )             44.7     09-25    140  |  102  |  15  ----------------------------<  125[H]  5.0   |  26  |  0.84    Ca    9.4      25 Sep 2024 14:44  Phos  3.4     09-25  Mg     2.10     09-25    TPro  7.0  /  Alb  3.7  /  TBili  0.6  /  DBili  x   /  AST  14  /  ALT  13  /  AlkPhos  85  09-25    Urinalysis Basic - ( 25 Sep 2024 14:44 )    Color: x / Appearance: x / SG: x / pH: x  Gluc: 125 mg/dL / Ketone: x  / Bili: x / Urobili: x   Blood: x / Protein: x / Nitrite: x   Leuk Esterase: x / RBC: x / WBC x   Sq Epi: x / Non Sq Epi: x / Bacteria: x    RADIOLOGY, EKG & ADDITIONAL TESTS: Reviewed.   < from: Xray Chest 1 View AP/PA (09.25.24 @ 17:52) >    INTERPRETATION:  EXAMINATION: XR CHEST    CLINICAL INDICATION: ams    COMPARISON: None.    TECHNIQUE: Frontal view of the chest.    FINDINGS:    Reticular opacities in the bibasilar lungs. No pleural effusion. No   pneumothorax.  The heart size is normal.  No acute osseous abnormalities.    IMPRESSION:  Reticular opacities in the bibasilar lungs.    ******PRELIMINARY REPORT******      ******PRELIMINARY REPORT******       RONAK SANDS MD; Resident Radiologist  This document is a PRELIMINARY interpretation and is pending final   attending approval. Sep 25 2024  8:40PM    < end of copied text >

## 2024-09-26 ENCOUNTER — RESULT REVIEW (OUTPATIENT)
Age: 89
End: 2024-09-26

## 2024-09-26 LAB
ANION GAP SERPL CALC-SCNC: 12 MMOL/L — SIGNIFICANT CHANGE UP (ref 7–14)
BASOPHILS # BLD AUTO: 0.05 K/UL — SIGNIFICANT CHANGE UP (ref 0–0.2)
BASOPHILS NFR BLD AUTO: 0.5 % — SIGNIFICANT CHANGE UP (ref 0–2)
BLOOD GAS VENOUS COMPREHENSIVE RESULT: SIGNIFICANT CHANGE UP
BUN SERPL-MCNC: 14 MG/DL — SIGNIFICANT CHANGE UP (ref 7–23)
CALCIUM SERPL-MCNC: 8.6 MG/DL — SIGNIFICANT CHANGE UP (ref 8.4–10.5)
CHLORIDE SERPL-SCNC: 104 MMOL/L — SIGNIFICANT CHANGE UP (ref 98–107)
CO2 SERPL-SCNC: 24 MMOL/L — SIGNIFICANT CHANGE UP (ref 22–31)
CREAT SERPL-MCNC: 0.74 MG/DL — SIGNIFICANT CHANGE UP (ref 0.5–1.3)
EGFR: 84 ML/MIN/1.73M2 — SIGNIFICANT CHANGE UP
EOSINOPHIL # BLD AUTO: 0.21 K/UL — SIGNIFICANT CHANGE UP (ref 0–0.5)
EOSINOPHIL NFR BLD AUTO: 2.1 % — SIGNIFICANT CHANGE UP (ref 0–6)
FLUAV AG NPH QL: SIGNIFICANT CHANGE UP
FLUBV AG NPH QL: SIGNIFICANT CHANGE UP
GLUCOSE SERPL-MCNC: 95 MG/DL — SIGNIFICANT CHANGE UP (ref 70–99)
HCT VFR BLD CALC: 40.4 % — SIGNIFICANT CHANGE UP (ref 39–50)
HGB BLD-MCNC: 13.1 G/DL — SIGNIFICANT CHANGE UP (ref 13–17)
IANC: 7.85 K/UL — HIGH (ref 1.8–7.4)
IMM GRANULOCYTES NFR BLD AUTO: 0.9 % — SIGNIFICANT CHANGE UP (ref 0–0.9)
LYMPHOCYTES # BLD AUTO: 1.29 K/UL — SIGNIFICANT CHANGE UP (ref 1–3.3)
LYMPHOCYTES # BLD AUTO: 12.9 % — LOW (ref 13–44)
MAGNESIUM SERPL-MCNC: 2.1 MG/DL — SIGNIFICANT CHANGE UP (ref 1.6–2.6)
MCHC RBC-ENTMCNC: 30.4 PG — SIGNIFICANT CHANGE UP (ref 27–34)
MCHC RBC-ENTMCNC: 32.4 GM/DL — SIGNIFICANT CHANGE UP (ref 32–36)
MCV RBC AUTO: 93.7 FL — SIGNIFICANT CHANGE UP (ref 80–100)
MONOCYTES # BLD AUTO: 0.52 K/UL — SIGNIFICANT CHANGE UP (ref 0–0.9)
MONOCYTES NFR BLD AUTO: 5.2 % — SIGNIFICANT CHANGE UP (ref 2–14)
MRSA PCR RESULT.: SIGNIFICANT CHANGE UP
NEUTROPHILS # BLD AUTO: 7.85 K/UL — HIGH (ref 1.8–7.4)
NEUTROPHILS NFR BLD AUTO: 78.4 % — HIGH (ref 43–77)
NRBC # BLD: 0 /100 WBCS — SIGNIFICANT CHANGE UP (ref 0–0)
NRBC # FLD: 0 K/UL — SIGNIFICANT CHANGE UP (ref 0–0)
PHOSPHATE SERPL-MCNC: 3.5 MG/DL — SIGNIFICANT CHANGE UP (ref 2.5–4.5)
PLATELET # BLD AUTO: 215 K/UL — SIGNIFICANT CHANGE UP (ref 150–400)
POTASSIUM SERPL-MCNC: 4.4 MMOL/L — SIGNIFICANT CHANGE UP (ref 3.5–5.3)
POTASSIUM SERPL-SCNC: 4.4 MMOL/L — SIGNIFICANT CHANGE UP (ref 3.5–5.3)
RBC # BLD: 4.31 M/UL — SIGNIFICANT CHANGE UP (ref 4.2–5.8)
RBC # FLD: 16 % — HIGH (ref 10.3–14.5)
RSV RNA NPH QL NAA+NON-PROBE: SIGNIFICANT CHANGE UP
S AUREUS DNA NOSE QL NAA+PROBE: SIGNIFICANT CHANGE UP
SARS-COV-2 RNA SPEC QL NAA+PROBE: SIGNIFICANT CHANGE UP
SODIUM SERPL-SCNC: 140 MMOL/L — SIGNIFICANT CHANGE UP (ref 135–145)
WBC # BLD: 10.01 K/UL — SIGNIFICANT CHANGE UP (ref 3.8–10.5)
WBC # FLD AUTO: 10.01 K/UL — SIGNIFICANT CHANGE UP (ref 3.8–10.5)

## 2024-09-26 PROCEDURE — 93306 TTE W/DOPPLER COMPLETE: CPT | Mod: 26

## 2024-09-26 RX ADMIN — AZITHROMYCIN 255 MILLIGRAM(S): 250 TABLET, FILM COATED ORAL at 09:11

## 2024-09-26 RX ADMIN — Medication 75 MILLIGRAM(S): at 09:13

## 2024-09-26 RX ADMIN — Medication 2 TABLET(S): at 22:20

## 2024-09-26 RX ADMIN — FINASTERIDE 5 MILLIGRAM(S): 5 TABLET, FILM COATED ORAL at 09:12

## 2024-09-26 RX ADMIN — Medication 100 MILLIGRAM(S): at 07:02

## 2024-09-26 RX ADMIN — Medication 0.4 MILLIGRAM(S): at 22:20

## 2024-09-26 RX ADMIN — ATORVASTATIN CALCIUM 10 MILLIGRAM(S): 10 TABLET, FILM COATED ORAL at 22:20

## 2024-09-26 RX ADMIN — PREDNISONE 10 MILLIGRAM(S): 5 TABLET ORAL at 05:51

## 2024-09-26 RX ADMIN — ENOXAPARIN SODIUM 40 MILLIGRAM(S): 150 INJECTION SUBCUTANEOUS at 12:45

## 2024-09-26 NOTE — PROGRESS NOTE ADULT - ASSESSMENT
89M with PMH cognitive dysfunction (AAOx2 at baseline), advanced COPD on daily prednisone with chronic respiratory failure on 4L NC, CAD/MI s/p stent, HLD, and BPH BIBEMS from [facility] for confusion x 2 days. Chest imaging suggestive of pulmonary edema vs pneumonia.         Problem/Plan - 1:  ·  Problem: Acute metabolic encephalopathy.   ·  Plan: AAOx2 (name, location) at baseline p/w intermittent episodes of confusion, likely delirium in setting of infection vs pulmonary edema. AAOx2 on admission   - without any focal deficits to suggest structural etiology  - treatment for infection vs edema as below   - frequent reorientation  - maintain normal sleep/wake cycles (avoid waking patient between 11PM and 6AM unless medically necessary).    Problem/Plan - 2:  ·  Problem: Leukocytosis.   ·  Plan: mild leukocytosis to 12k on admission, infectious vs reactive. afebrile, not septic, hemodynamically stable. Without any new fevers, URI sxs, worsening of chronic cough, or increased O2 requirements. Procal negative. Favoring pulmonary edema over pneumonia at this time at this time   - ID fu   - check CT chest   Problem/Plan - 3:  ·  Problem: Advanced COPD.   ·  Plan: low suspicion for COPD exacerbation, lung exam with localized rhonchi/coarse crackles over R>L base. No increased baseline O2 requirement or worsening of chronic cough  - continue home prednisone 10mg daily   - duonebs PRN.    Problem/Plan - 4:  ·  Problem: Chronic hypoxic respiratory failure.   ·  Plan: - continue home NC at 4L NC, goal 88-92&.    Problem/Plan - 5:  ·  Problem: CAD (coronary artery disease).   ·  Plan: - continue home statin, plavix.    Problem/Plan - 6:  ·  Problem: BPH (benign prostatic hyperplasia).   ·  Plan: - continue home finasteride and tamsulosin.    Problem/Plan - 7:  ·  Problem: Prophylactic measure.   ·  Plan: Diet: soft, bite sized, DASH  DVT ppx: lovenox SQ  Dispo: pending clinical course; from [facility]; PT jeronimo

## 2024-09-26 NOTE — PROGRESS NOTE ADULT - SUBJECTIVE AND OBJECTIVE BOX
Patient is a 89y old  Male who presents with a chief complaint of acute metabolic encephalopathy (26 Sep 2024 12:38)    Date of servie : 09-26-24 @ 14:39  INTERVAL HPI/OVERNIGHT EVENTS:  T(C): 36.2 (09-26-24 @ 11:31), Max: 37.1 (09-25-24 @ 20:00)  HR: 70 (09-26-24 @ 11:31) (70 - 88)  BP: 125/61 (09-26-24 @ 11:31) (120/71 - 138/57)  RR: 18 (09-26-24 @ 11:31) (18 - 20)  SpO2: 99% (09-26-24 @ 11:31) (90% - 99%)  Wt(kg): --  I&O's Summary      LABS:                        13.1   10.01 )-----------( 215      ( 26 Sep 2024 07:00 )             40.4     09-26    140  |  104  |  14  ----------------------------<  95  4.4   |  24  |  0.74    Ca    8.6      26 Sep 2024 07:00  Phos  3.5     09-26  Mg     2.10     09-26    TPro  7.0  /  Alb  3.7  /  TBili  0.6  /  DBili  x   /  AST  14  /  ALT  13  /  AlkPhos  85  09-25      Urinalysis Basic - ( 26 Sep 2024 07:00 )    Color: x / Appearance: x / SG: x / pH: x  Gluc: 95 mg/dL / Ketone: x  / Bili: x / Urobili: x   Blood: x / Protein: x / Nitrite: x   Leuk Esterase: x / RBC: x / WBC x   Sq Epi: x / Non Sq Epi: x / Bacteria: x      CAPILLARY BLOOD GLUCOSE            Urinalysis Basic - ( 26 Sep 2024 07:00 )    Color: x / Appearance: x / SG: x / pH: x  Gluc: 95 mg/dL / Ketone: x  / Bili: x / Urobili: x   Blood: x / Protein: x / Nitrite: x   Leuk Esterase: x / RBC: x / WBC x   Sq Epi: x / Non Sq Epi: x / Bacteria: x        MEDICATIONS  (STANDING):  albuterol/ipratropium for Nebulization. 3 milliLiter(s) Nebulizer once  atorvastatin 10 milliGRAM(s) Oral at bedtime  azithromycin  IVPB 500 milliGRAM(s) IV Intermittent every 24 hours  cefTRIAXone   IVPB 1000 milliGRAM(s) IV Intermittent every 24 hours  clopidogrel Tablet 75 milliGRAM(s) Oral daily  enoxaparin Injectable 40 milliGRAM(s) SubCutaneous every 24 hours  finasteride 5 milliGRAM(s) Oral daily  furosemide    Tablet 40 milliGRAM(s) Oral once  predniSONE   Tablet 10 milliGRAM(s) Oral daily  senna 2 Tablet(s) Oral at bedtime  tamsulosin 0.4 milliGRAM(s) Oral at bedtime    MEDICATIONS  (PRN):  acetaminophen     Tablet .. 650 milliGRAM(s) Oral every 6 hours PRN Temp greater or equal to 38C (100.4F), Mild Pain (1 - 3)  albuterol/ipratropium for Nebulization 3 milliLiter(s) Nebulizer every 6 hours PRN Shortness of Breath and/or Wheezing  aluminum hydroxide/magnesium hydroxide/simethicone Suspension 30 milliLiter(s) Oral every 4 hours PRN Dyspepsia  melatonin 3 milliGRAM(s) Oral at bedtime PRN Insomnia  ondansetron Injectable 4 milliGRAM(s) IV Push every 8 hours PRN Nausea and/or Vomiting          PHYSICAL EXAM:  GENERAL: NAD, well-groomed, well-developed  HEAD:  Atraumatic, Normocephalic  CHEST/LUNG: Clear to percussion bilaterally; No rales, rhonchi, wheezing, or rubs  HEART: Regular rate and rhythm; No murmurs, rubs, or gallops  ABDOMEN: Soft, Nontender, Nondistended; Bowel sounds present  EXTREMITIES:  2+ Peripheral Pulses, No clubbing, cyanosis, or edema  LYMPH: No lymphadenopathy noted  SKIN: No rashes or lesions    Care Discussed with Consultants/Other Providers [ ] YES  [ ] NO

## 2024-09-26 NOTE — PHYSICAL THERAPY INITIAL EVALUATION ADULT - PERTINENT HX OF CURRENT PROBLEM, REHAB EVAL
89 year old male presents from nursing home for confusion for 2 days. Chest imaging suggestive of pulmonary edema vs pneumonia.

## 2024-09-26 NOTE — PHYSICAL THERAPY INITIAL EVALUATION ADULT - GAIT DISTANCE, PT EVAL
15 feet. Pt SpO2 dropped to ~65-75% following ambulation. RN Freddie aware, pt placed on non rebreather. SpO2 improved to 93-95%.

## 2024-09-26 NOTE — PATIENT PROFILE ADULT - FALL HARM RISK - HARM RISK INTERVENTIONS
Assistance with ambulation/Assistance OOB with selected safe patient handling equipment/Communicate Risk of Fall with Harm to all staff/Discuss with provider need for PT consult/Monitor gait and stability/Reinforce activity limits and safety measures with patient and family/Tailored Fall Risk Interventions/Visual Cue: Yellow wristband and red socks/Bed in lowest position, wheels locked, appropriate side rails in place/Call bell, personal items and telephone in reach/Instruct patient to call for assistance before getting out of bed or chair/Non-slip footwear when patient is out of bed/[place] to call system/Physically safe environment - no spills, clutter or unnecessary equipment/Purposeful Proactive Rounding/Room/bathroom lighting operational, light cord in reach

## 2024-09-26 NOTE — PATIENT PROFILE ADULT - FUNCTIONAL ASSESSMENT - BASIC MOBILITY 4.
Please call & inform patient:  1.  Urinalysis is grossly normal.  Small amount of protein we will recheck this at follow-up  2.  Mild elevation of white count, red cell count and platelets are normal  3.  A1c, average blood sugar remains relatively stable, based on age, control, no changes to current regimen  4.  Electrolytes are normal, renal function is stable and at baseline, liver function is normal, borderline calcium elevation stable not progressed  5.  Urine microalbumin creatinine ratio is normal  Stool studies pending collection  Thank you,  GINA Osorio 2 = A lot of assistance

## 2024-09-26 NOTE — PHYSICAL THERAPY INITIAL EVALUATION ADULT - ADDITIONAL COMMENTS
Pt admitted from nursing home, prior to admission pt reports being independent in ADLs and ambulation, occasionally utilizes a rolling walker.    Following evaluation, pt was left semireclined in bed in no distress, all lines in tact, call bell in reach.

## 2024-09-26 NOTE — PATIENT PROFILE ADULT - FALL HARM RISK - ATTEMPT OOB
Diagnosis: Neck mass R22.1  Procedure:  Direct laryngoscopy CPT 06638, Excision of right neck mass CPT 47923, partial thyroidectomy CPT 61347  Patient's preferred date:  To be determined  Duration of Procedure: 2 hours  Hospital: [facility]  Anesthesia: General  Length of Stay: Day Surgery  Surgical Assist: Dr. Gold to assist, please coordinate.  Consent:  Not done     Please coordinate:   - First postop visit on the Tuesday after surgery for drain removal.  - Preop appt with PCP:  Yes  - Preop H&P/Testing to be done by: PCP  -Special needs:  Need frozen section availability     Staff-please order Ancef 2 g IV on-call to OR as well as preop COVID testing per our protocol.   No

## 2024-09-27 LAB
ANION GAP SERPL CALC-SCNC: 14 MMOL/L — SIGNIFICANT CHANGE UP (ref 7–14)
BUN SERPL-MCNC: 14 MG/DL — SIGNIFICANT CHANGE UP (ref 7–23)
CALCIUM SERPL-MCNC: 8.5 MG/DL — SIGNIFICANT CHANGE UP (ref 8.4–10.5)
CHLORIDE SERPL-SCNC: 104 MMOL/L — SIGNIFICANT CHANGE UP (ref 98–107)
CO2 SERPL-SCNC: 21 MMOL/L — LOW (ref 22–31)
CREAT SERPL-MCNC: 0.76 MG/DL — SIGNIFICANT CHANGE UP (ref 0.5–1.3)
EGFR: 83 ML/MIN/1.73M2 — SIGNIFICANT CHANGE UP
GLUCOSE SERPL-MCNC: 100 MG/DL — HIGH (ref 70–99)
HCT VFR BLD CALC: 39.8 % — SIGNIFICANT CHANGE UP (ref 39–50)
HGB BLD-MCNC: 12.9 G/DL — LOW (ref 13–17)
MAGNESIUM SERPL-MCNC: 2 MG/DL — SIGNIFICANT CHANGE UP (ref 1.6–2.6)
MCHC RBC-ENTMCNC: 30.2 PG — SIGNIFICANT CHANGE UP (ref 27–34)
MCHC RBC-ENTMCNC: 32.4 GM/DL — SIGNIFICANT CHANGE UP (ref 32–36)
MCV RBC AUTO: 93.2 FL — SIGNIFICANT CHANGE UP (ref 80–100)
NRBC # BLD: 0 /100 WBCS — SIGNIFICANT CHANGE UP (ref 0–0)
NRBC # FLD: 0 K/UL — SIGNIFICANT CHANGE UP (ref 0–0)
PHOSPHATE SERPL-MCNC: 3.5 MG/DL — SIGNIFICANT CHANGE UP (ref 2.5–4.5)
PLATELET # BLD AUTO: 195 K/UL — SIGNIFICANT CHANGE UP (ref 150–400)
POTASSIUM SERPL-MCNC: 4.3 MMOL/L — SIGNIFICANT CHANGE UP (ref 3.5–5.3)
POTASSIUM SERPL-SCNC: 4.3 MMOL/L — SIGNIFICANT CHANGE UP (ref 3.5–5.3)
RBC # BLD: 4.27 M/UL — SIGNIFICANT CHANGE UP (ref 4.2–5.8)
RBC # FLD: 15.9 % — HIGH (ref 10.3–14.5)
SODIUM SERPL-SCNC: 139 MMOL/L — SIGNIFICANT CHANGE UP (ref 135–145)
WBC # BLD: 8.05 K/UL — SIGNIFICANT CHANGE UP (ref 3.8–10.5)
WBC # FLD AUTO: 8.05 K/UL — SIGNIFICANT CHANGE UP (ref 3.8–10.5)

## 2024-09-27 PROCEDURE — 93010 ELECTROCARDIOGRAM REPORT: CPT

## 2024-09-27 RX ORDER — DILTIAZEM HCL 300 MG
30 CAPSULE, EXTENDED RELEASE 24HR ORAL EVERY 6 HOURS
Refills: 0 | Status: DISCONTINUED | OUTPATIENT
Start: 2024-09-27 | End: 2024-10-01

## 2024-09-27 RX ORDER — OLANZAPINE 2.5 MG
2.5 TABLET ORAL ONCE
Refills: 0 | Status: COMPLETED | OUTPATIENT
Start: 2024-09-27 | End: 2024-09-27

## 2024-09-27 RX ORDER — METOPROLOL TARTRATE 50 MG
5 TABLET ORAL ONCE
Refills: 0 | Status: COMPLETED | OUTPATIENT
Start: 2024-09-27 | End: 2024-09-27

## 2024-09-27 RX ORDER — SODIUM CHLORIDE 0.9 % (FLUSH) 0.9 %
1000 SYRINGE (ML) INJECTION ONCE
Refills: 0 | Status: COMPLETED | OUTPATIENT
Start: 2024-09-27 | End: 2024-09-27

## 2024-09-27 RX ORDER — METOPROLOL TARTRATE 50 MG
25 TABLET ORAL
Refills: 0 | Status: DISCONTINUED | OUTPATIENT
Start: 2024-09-27 | End: 2024-09-27

## 2024-09-27 RX ORDER — METOPROLOL TARTRATE 50 MG
5 TABLET ORAL EVERY 6 HOURS
Refills: 0 | Status: DISCONTINUED | OUTPATIENT
Start: 2024-09-27 | End: 2024-09-27

## 2024-09-27 RX ORDER — AZITHROMYCIN 250 MG/1
500 TABLET, FILM COATED ORAL EVERY 24 HOURS
Refills: 0 | Status: COMPLETED | OUTPATIENT
Start: 2024-09-27 | End: 2024-09-28

## 2024-09-27 RX ORDER — OLANZAPINE 2.5 MG
5 TABLET ORAL ONCE
Refills: 0 | Status: DISCONTINUED | OUTPATIENT
Start: 2024-09-27 | End: 2024-09-27

## 2024-09-27 RX ADMIN — Medication 30 MILLIGRAM(S): at 17:20

## 2024-09-27 RX ADMIN — Medication 2.5 MILLIGRAM(S): at 21:03

## 2024-09-27 RX ADMIN — PREDNISONE 10 MILLIGRAM(S): 5 TABLET ORAL at 06:30

## 2024-09-27 RX ADMIN — AZITHROMYCIN 255 MILLIGRAM(S): 250 TABLET, FILM COATED ORAL at 09:41

## 2024-09-27 RX ADMIN — Medication 100 MILLIGRAM(S): at 07:06

## 2024-09-27 RX ADMIN — ENOXAPARIN SODIUM 40 MILLIGRAM(S): 150 INJECTION SUBCUTANEOUS at 09:41

## 2024-09-27 RX ADMIN — Medication 500 MILLILITER(S): at 15:12

## 2024-09-27 RX ADMIN — Medication 2.5 MILLIGRAM(S): at 10:58

## 2024-09-27 RX ADMIN — Medication 75 MILLIGRAM(S): at 09:42

## 2024-09-27 RX ADMIN — Medication 5 MILLIGRAM(S): at 14:30

## 2024-09-27 RX ADMIN — FINASTERIDE 5 MILLIGRAM(S): 5 TABLET, FILM COATED ORAL at 09:42

## 2024-09-27 NOTE — PROGRESS NOTE ADULT - ASSESSMENT
89M with PMH cognitive dysfunction (AAOx2 at baseline), advanced COPD on daily prednisone with chronic respiratory failure on 4L NC, CAD/MI s/p stent, HLD, and BPH BIBEMS from [facility] for confusion x 2 days. Chest imaging suggestive of pulmonary edema vs pneumonia.         Problem/Plan - 1:  ·  Problem: Acute metabolic encephalopathy.   ·  Plan: check repeat CT head   - without any focal deficits to suggest structural etiology- treatment for infection vs edema as below   - frequent reorientation  - maintain normal sleep/wake cycles (avoid waking patient between 11PM and 6AM unless medically necessary).    Problem/Plan - 2:  ·  Problem: Leukocytosis.   ·  Plan: mild leukocytosis to 12k on admission, infectious vs reactive. afebrile, not septic, hemodynamically stable. Without any new fevers, URI sxs, worsening of chronic cough, or increased O2 requirements. Procal negative. Favoring pulmonary edema over pneumonia at this time at this time   - ID fu   - cw abx as per ID     Problem/Plan - 3:  ·  Problem: Advanced COPD.   ·  Plan: low suspicion for COPD exacerbation, lung exam with localized rhonchi/coarse crackles over R>L base. No increased baseline O2 requirement or worsening of chronic cough  - continue home prednisone 10mg daily   - duonebs PRN.    Problem/Plan - 4:  ·  Problem: Chronic hypoxic respiratory failure.   ·  Plan: - continue home NC at 4L NC, goal 88-92&.    Problem/Plan - 5:  ·  Problem: CAD (coronary artery disease).   ·  Plan: - continue home statin, plavix.    Problem/Plan - 6:  ·  Problem: BPH (benign prostatic hyperplasia).   ·  Plan: - continue home finasteride and tamsulosin.    Problem/Plan - 7:  ·  Problem: nEW aflutter   ·  Plan: cw tele  - sp BB  - cards called

## 2024-09-27 NOTE — CHART NOTE - NSCHARTNOTEFT_GEN_A_CORE
Per discussion with cardiology attending and PA, will discontinue metoprolol and start cardizem for rate control.

## 2024-09-27 NOTE — PROGRESS NOTE ADULT - SUBJECTIVE AND OBJECTIVE BOX
Patient is a 89y old  Male who presents with a chief complaint of acute metabolic encephalopathy (27 Sep 2024 07:11)    Date of servie : 09-27-24 @ 14:35  INTERVAL HPI/OVERNIGHT EVENTS:  T(C): 36.5 (09-27-24 @ 13:48), Max: 36.6 (09-27-24 @ 12:29)  HR: 141 (09-27-24 @ 14:26) (63 - 141)  BP: 101/61 (09-27-24 @ 14:26) (99/56 - 127/80)  RR: 19 (09-27-24 @ 13:48) (18 - 19)  SpO2: 96% (09-27-24 @ 13:48) (95% - 98%)  Wt(kg): --  I&O's Summary    26 Sep 2024 07:01  -  27 Sep 2024 07:00  --------------------------------------------------------  IN: 400 mL / OUT: 700 mL / NET: -300 mL        LABS:                        12.9   8.05  )-----------( 195      ( 27 Sep 2024 07:20 )             39.8     09-27    139  |  104  |  14  ----------------------------<  100[H]  4.3   |  21[L]  |  0.76    Ca    8.5      27 Sep 2024 07:20  Phos  3.5     09-27  Mg     2.00     09-27    TPro  7.0  /  Alb  3.7  /  TBili  0.6  /  DBili  x   /  AST  14  /  ALT  13  /  AlkPhos  85  09-25      Urinalysis Basic - ( 27 Sep 2024 07:20 )    Color: x / Appearance: x / SG: x / pH: x  Gluc: 100 mg/dL / Ketone: x  / Bili: x / Urobili: x   Blood: x / Protein: x / Nitrite: x   Leuk Esterase: x / RBC: x / WBC x   Sq Epi: x / Non Sq Epi: x / Bacteria: x      CAPILLARY BLOOD GLUCOSE            Urinalysis Basic - ( 27 Sep 2024 07:20 )    Color: x / Appearance: x / SG: x / pH: x  Gluc: 100 mg/dL / Ketone: x  / Bili: x / Urobili: x   Blood: x / Protein: x / Nitrite: x   Leuk Esterase: x / RBC: x / WBC x   Sq Epi: x / Non Sq Epi: x / Bacteria: x        MEDICATIONS  (STANDING):  albuterol/ipratropium for Nebulization. 3 milliLiter(s) Nebulizer once  atorvastatin 10 milliGRAM(s) Oral at bedtime  azithromycin  IVPB 500 milliGRAM(s) IV Intermittent every 24 hours  cefTRIAXone   IVPB 1000 milliGRAM(s) IV Intermittent every 24 hours  clopidogrel Tablet 75 milliGRAM(s) Oral daily  enoxaparin Injectable 40 milliGRAM(s) SubCutaneous every 24 hours  finasteride 5 milliGRAM(s) Oral daily  furosemide    Tablet 40 milliGRAM(s) Oral once  predniSONE   Tablet 10 milliGRAM(s) Oral daily  senna 2 Tablet(s) Oral at bedtime  tamsulosin 0.4 milliGRAM(s) Oral at bedtime    MEDICATIONS  (PRN):  acetaminophen     Tablet .. 650 milliGRAM(s) Oral every 6 hours PRN Temp greater or equal to 38C (100.4F), Mild Pain (1 - 3)  albuterol/ipratropium for Nebulization 3 milliLiter(s) Nebulizer every 6 hours PRN Shortness of Breath and/or Wheezing  aluminum hydroxide/magnesium hydroxide/simethicone Suspension 30 milliLiter(s) Oral every 4 hours PRN Dyspepsia  melatonin 3 milliGRAM(s) Oral at bedtime PRN Insomnia  ondansetron Injectable 4 milliGRAM(s) IV Push every 8 hours PRN Nausea and/or Vomiting          PHYSICAL EXAM:  GENERAL: NAD, well-groomed, well-developed  HEAD:  Atraumatic, Normocephalic  CHEST/LUNG: Clear to percussion bilaterally; No rales, rhonchi, wheezing, or rubs  HEART: Regular rate and rhythm; No murmurs, rubs, or gallops  ABDOMEN: Soft, Nontender, Nondistended; Bowel sounds present  EXTREMITIES:  edema +    Care Discussed with Consultants/Other Providers [ ] YES  [ ] NO

## 2024-09-27 NOTE — PROGRESS NOTE ADULT - ASSESSMENT
89 y/o M PMhx COPD on daily prednisone with chronic respiratory failure on 4L NC, CAD s/p stent, BPH, AAOx2 at baseline who presented from nursing home for confusion    r/o PNA  chronic hypoxic resp failure- on baseline 4L NC  leukocytosis- resolved  AMS- resolved, appears back to baseline A&Ox 2  afebrile  procal negative  SARS-CoV-2/ flu/ RSV PCR negative  CXR- Reticular opacities in the bibasilar lungs likely reflecting interstitial lung disease.  he denies URI or resp symptoms though he is a poor historian    lower suspicion for PNA at this time   Recommendations  c/w ceftriaxone 1g daily/ azithromycin pending CT chest  f/u blood cultures    Jesse Brewer M.D.  [facility], Division of Infectious Diseases  [phone]  After 5pm on weekdays and all day on weekends - please call [phone]  89 y/o M PMhx COPD on daily prednisone with chronic respiratory failure on 4L NC, CAD s/p stent, BPH, AAOx2 at baseline who presented from nursing home for confusion    r/o PNA  chronic hypoxic resp failure- on baseline 4L NC  leukocytosis- resolved  AMS- resolved, appears back to baseline A&Ox 2  afebrile  procal negative  SARS-CoV-2/ flu/ RSV PCR negative  CXR- Reticular opacities in the bibasilar lungs likely reflecting interstitial lung disease.  he denies URI or resp symptoms though he is a poor historian    lower suspicion for PNA at this time   Recommendations  c/w ceftriaxone 1g daily/ azithromycin pending CT chest  f/u blood cultures    Dr. Jakub Peguero will be covering 9/28 & 9/29. I will resume care on 9/30     Jesse Brewer M.D.  [facility], Division of Infectious Diseases  [phone]  After 5pm on weekdays and all day on weekends - please call [phone]  89 y/o M PMhx COPD on daily prednisone with chronic respiratory failure on 4L NC, CAD s/p stent, BPH, AAOx2 at baseline who presented from nursing home for confusion    r/o PNA  chronic hypoxic resp failure- on baseline 4L NC  leukocytosis- resolved  AMS- resolved, appears back to baseline A&Ox 2  afebrile  procal negative  SARS-CoV-2/ flu/ RSV PCR negative  CXR- Reticular opacities in the bibasilar lungs likely reflecting interstitial lung disease.  he denies URI or resp symptoms though he is a poor historian    lower suspicion for PNA at this time   Recommendations  c/w ceftriaxone 1g daily x 5 days and azithromycin 500mg x 3 days pending CT chest  f/u blood cultures    Dr. Jakub Peguero will be covering 9/28 & 9/29. I will resume care on 9/30     Jesse Brewer M.D.  [facility], Division of Infectious Diseases  [phone]  After 5pm on weekdays and all day on weekends - please call [phone]

## 2024-09-27 NOTE — CHART NOTE - NSCHARTNOTEFT_GEN_A_CORE
Provider notified by RN that patient with new aflutter with rates of 130-140s. BP taken by provider at bedside 101/61, patient afebrile. IV metoprolol 5mg x1 ordered STAT. Patient A&Ox2, however, denies chest pain, shortness of breath, dizziness at this time. Case discussed with medicine attending, Dr. Cooper; Dr. Cooper deferring EP consult at this time, states he will call cardiology consult. Appreciate cardiology recommendations. Provider notified by RN that patient with new aflutter with rates of 130-140s. BP taken by provider at bedside 101/61, patient afebrile. IV metoprolol 5mg x1 ordered STAT. Patient A&Ox2, however, denies chest pain, shortness of breath, dizziness at this time. Case discussed with medicine attending, Dr. Cooper; Dr. Cooper deferring EP consult at this time, states he will call cardiology consult. Appreciate cardiology recommendations.    ADDENDUM: Spoke w/ patient's son, Landon, and updated with current plan of care. All other medical questions were answered to the best of my ability and teach-back offered with demonstrated understanding. Per Landon, patient does not have a history of aflutter. CTH ordered due to altered mental status with low suspicion of infectious cause as infectious work up negative to date. Provider notified by RN that patient with new aflutter with rates of 130-140s. BP taken by provider at bedside 101/61, patient afebrile. IV metoprolol 5mg x1 ordered STAT. 1L NS bolus ordered. Patient A&Ox2, however, denies chest pain, shortness of breath, dizziness at this time. Case discussed with medicine attending, Dr. Cooper; Dr. Cooper deferring EP consult at this time, states he will call cardiology consult. Appreciate cardiology recommendations.    ADDENDUM: Spoke w/ patient's son, Landon, and updated with current plan of care. All other medical questions were answered to the best of my ability and teach-back offered with demonstrated understanding. Per [place], patient does not have a history of aflutter. CTH ordered due to altered mental status with low suspicion of infectious cause as infectious work up negative to date. On further discussion with Dr. Cooper, will start metoprolol 25mg BID; holding off on starting AC per Dr. Cooper pending cards input as patient is a fall risk.

## 2024-09-27 NOTE — PROGRESS NOTE ADULT - SUBJECTIVE AND OBJECTIVE BOX
[facility], Division of Infectious Diseases  DAV Rudd Y. Patel, S. Shah, G. Osman  [phone]  ([phone] - weekdays after 5pm and weekends)    Name: MICHELLE CHEUNG  Age/Gender: 89y Male  MRN: [id number]    Interval History:  Notes reviewed.   No concerning overnight events.  Afebrile.   denies SOB or URI symptoms    Allergies: No Known Allergies      Objective:  Vitals:   T(F): 97.5 (09-27-24 @ 06:25), Max: 97.5 (09-26-24 @ 20:07)  HR: 63 (09-27-24 @ 06:25) (63 - 72)  BP: 113/66 (09-27-24 @ 06:25) (113/66 - 125/71)  RR: 18 (09-27-24 @ 06:25) (18 - 18)  SpO2: 98% (09-27-24 @ 06:25) (95% - 99%)  Physical Examination:  General: no acute distress  HEENT: anicteric, NC  Cardio: S1, S2, normal rate  Resp: clear to auscultation   Abd: soft, NT, ND  Ext: trace LE edema  Skin: warm, dry    Laboratory Studies:  CBC:                       13.1   10.01 )-----------( 215      ( 26 Sep 2024 07:00 )             40.4     WBC Trend:  10.01 09-26-24 @ 07:00  12.21 09-25-24 @ 14:44    CMP: 09-26    140  |  104  |  14  ----------------------------<  95  4.4   |  24  |  0.74    Ca    8.6      26 Sep 2024 07:00  Phos  3.5     09-26  Mg     2.10     09-26    TPro  7.0  /  Alb  3.7  /  TBili  0.6  /  DBili  x   /  AST  14  /  ALT  13  /  AlkPhos  85  09-25      LIVER FUNCTIONS - ( 25 Sep 2024 14:44 )  Alb: 3.7 g/dL / Pro: 7.0 g/dL / ALK PHOS: 85 U/L / ALT: 13 U/L / AST: 14 U/L / GGT: x             Urinalysis Basic - ( 26 Sep 2024 07:00 )    Color: x / Appearance: x / SG: x / pH: x  Gluc: 95 mg/dL / Ketone: x  / Bili: x / Urobili: x   Blood: x / Protein: x / Nitrite: x   Leuk Esterase: x / RBC: x / WBC x   Sq Epi: x / Non Sq Epi: x / Bacteria: x      Microbiology: reviewed     Urinalysis with Rflx Culture (collected 09-25-24 @ 13:50)        Radiology: reviewed     Medications:  acetaminophen     Tablet .. 650 milliGRAM(s) Oral every 6 hours PRN  albuterol/ipratropium for Nebulization 3 milliLiter(s) Nebulizer every 6 hours PRN  albuterol/ipratropium for Nebulization. 3 milliLiter(s) Nebulizer once  aluminum hydroxide/magnesium hydroxide/simethicone Suspension 30 milliLiter(s) Oral every 4 hours PRN  atorvastatin 10 milliGRAM(s) Oral at bedtime  azithromycin  IVPB 500 milliGRAM(s) IV Intermittent every 24 hours  cefTRIAXone   IVPB 1000 milliGRAM(s) IV Intermittent every 24 hours  clopidogrel Tablet 75 milliGRAM(s) Oral daily  enoxaparin Injectable 40 milliGRAM(s) SubCutaneous every 24 hours  finasteride 5 milliGRAM(s) Oral daily  furosemide    Tablet 40 milliGRAM(s) Oral once  melatonin 3 milliGRAM(s) Oral at bedtime PRN  ondansetron Injectable 4 milliGRAM(s) IV Push every 8 hours PRN  predniSONE   Tablet 10 milliGRAM(s) Oral daily  senna 2 Tablet(s) Oral at bedtime  tamsulosin 0.4 milliGRAM(s) Oral at bedtime    Antimicrobials:  azithromycin  IVPB 500 milliGRAM(s) IV Intermittent every 24 hours  cefTRIAXone   IVPB 1000 milliGRAM(s) IV Intermittent every 24 hours

## 2024-09-27 NOTE — CONSULT NOTE ADULT - NS ATTEND AMEND GEN_ALL_CORE FT
Patient seen and examined. Agree with plan as detailed in PA/NP Note.     F/u w/u for reported 11 cm chest mass  start rate control with Diltiazem  EP Dr Marin to see    Hyun Cooper MD  Pager: [phone]

## 2024-09-28 LAB
ANION GAP SERPL CALC-SCNC: 13 MMOL/L — SIGNIFICANT CHANGE UP (ref 7–14)
BASOPHILS # BLD AUTO: 0.04 K/UL — SIGNIFICANT CHANGE UP (ref 0–0.2)
BASOPHILS NFR BLD AUTO: 0.4 % — SIGNIFICANT CHANGE UP (ref 0–2)
BUN SERPL-MCNC: 16 MG/DL — SIGNIFICANT CHANGE UP (ref 7–23)
CALCIUM SERPL-MCNC: 9 MG/DL — SIGNIFICANT CHANGE UP (ref 8.4–10.5)
CHLORIDE SERPL-SCNC: 105 MMOL/L — SIGNIFICANT CHANGE UP (ref 98–107)
CO2 SERPL-SCNC: 24 MMOL/L — SIGNIFICANT CHANGE UP (ref 22–31)
CREAT SERPL-MCNC: 0.88 MG/DL — SIGNIFICANT CHANGE UP (ref 0.5–1.3)
EGFR: 80 ML/MIN/1.73M2 — SIGNIFICANT CHANGE UP
EOSINOPHIL # BLD AUTO: 0.13 K/UL — SIGNIFICANT CHANGE UP (ref 0–0.5)
EOSINOPHIL NFR BLD AUTO: 1.2 % — SIGNIFICANT CHANGE UP (ref 0–6)
GLUCOSE SERPL-MCNC: 130 MG/DL — HIGH (ref 70–99)
HCT VFR BLD CALC: 43.1 % — SIGNIFICANT CHANGE UP (ref 39–50)
HGB BLD-MCNC: 14 G/DL — SIGNIFICANT CHANGE UP (ref 13–17)
IANC: 7.89 K/UL — HIGH (ref 1.8–7.4)
IMM GRANULOCYTES NFR BLD AUTO: 0.6 % — SIGNIFICANT CHANGE UP (ref 0–0.9)
LYMPHOCYTES # BLD AUTO: 19.6 % — SIGNIFICANT CHANGE UP (ref 13–44)
LYMPHOCYTES # BLD AUTO: 2.12 K/UL — SIGNIFICANT CHANGE UP (ref 1–3.3)
MAGNESIUM SERPL-MCNC: 2.1 MG/DL — SIGNIFICANT CHANGE UP (ref 1.6–2.6)
MCHC RBC-ENTMCNC: 29.8 PG — SIGNIFICANT CHANGE UP (ref 27–34)
MCHC RBC-ENTMCNC: 32.5 GM/DL — SIGNIFICANT CHANGE UP (ref 32–36)
MCV RBC AUTO: 91.7 FL — SIGNIFICANT CHANGE UP (ref 80–100)
MONOCYTES # BLD AUTO: 0.59 K/UL — SIGNIFICANT CHANGE UP (ref 0–0.9)
MONOCYTES NFR BLD AUTO: 5.4 % — SIGNIFICANT CHANGE UP (ref 2–14)
NEUTROPHILS # BLD AUTO: 7.89 K/UL — HIGH (ref 1.8–7.4)
NEUTROPHILS NFR BLD AUTO: 72.8 % — SIGNIFICANT CHANGE UP (ref 43–77)
NRBC # BLD: 0 /100 WBCS — SIGNIFICANT CHANGE UP (ref 0–0)
NRBC # FLD: 0 K/UL — SIGNIFICANT CHANGE UP (ref 0–0)
PHOSPHATE SERPL-MCNC: 2.8 MG/DL — SIGNIFICANT CHANGE UP (ref 2.5–4.5)
PLATELET # BLD AUTO: 241 K/UL — SIGNIFICANT CHANGE UP (ref 150–400)
POTASSIUM SERPL-MCNC: 4.2 MMOL/L — SIGNIFICANT CHANGE UP (ref 3.5–5.3)
POTASSIUM SERPL-SCNC: 4.2 MMOL/L — SIGNIFICANT CHANGE UP (ref 3.5–5.3)
RBC # BLD: 4.7 M/UL — SIGNIFICANT CHANGE UP (ref 4.2–5.8)
RBC # FLD: 15.7 % — HIGH (ref 10.3–14.5)
SODIUM SERPL-SCNC: 142 MMOL/L — SIGNIFICANT CHANGE UP (ref 135–145)
WBC # BLD: 10.83 K/UL — HIGH (ref 3.8–10.5)
WBC # FLD AUTO: 10.83 K/UL — HIGH (ref 3.8–10.5)

## 2024-09-28 RX ADMIN — ATORVASTATIN CALCIUM 10 MILLIGRAM(S): 10 TABLET, FILM COATED ORAL at 21:22

## 2024-09-28 RX ADMIN — Medication 75 MILLIGRAM(S): at 11:46

## 2024-09-28 RX ADMIN — ENOXAPARIN SODIUM 40 MILLIGRAM(S): 150 INJECTION SUBCUTANEOUS at 11:46

## 2024-09-28 RX ADMIN — PREDNISONE 10 MILLIGRAM(S): 5 TABLET ORAL at 05:39

## 2024-09-28 RX ADMIN — Medication 30 MILLIGRAM(S): at 05:38

## 2024-09-28 RX ADMIN — AZITHROMYCIN 255 MILLIGRAM(S): 250 TABLET, FILM COATED ORAL at 11:48

## 2024-09-28 RX ADMIN — Medication 100 MILLIGRAM(S): at 06:07

## 2024-09-28 RX ADMIN — Medication 2 TABLET(S): at 21:22

## 2024-09-28 RX ADMIN — Medication 30 MILLIGRAM(S): at 11:46

## 2024-09-28 RX ADMIN — FINASTERIDE 5 MILLIGRAM(S): 5 TABLET, FILM COATED ORAL at 11:46

## 2024-09-28 RX ADMIN — Medication 30 MILLIGRAM(S): at 18:02

## 2024-09-28 RX ADMIN — Medication 0.4 MILLIGRAM(S): at 21:22

## 2024-09-28 NOTE — CONSULT NOTE ADULT - SUBJECTIVE AND OBJECTIVE BOX
EP Attending  HISTORY OF PRESENT ILLNESS: HPI:  89M with PMH cognitive dysfunction (AAOx2 at baseline), advanced COPD on daily prednisone with chronic respiratory failure on 4L NC, CAD/MI s/p stent, HLD, and BPH BIBEMS from [facility] for confusion in setting of suspected pneumonia. Pt does not know why he was brought to the hospital, unable to provide much history. Collateral obtained from daughter/HCP Meghan Gonzalez. Pt noted to have intermittent episodes of confusion by NH staff over the past 2 days, occasionally become agitated, saying nonsensical things, all unlike him. Pt is AAOx2 at baseline, would not know the year, but otherwise conversational. Whenever he was visited by daughter or son though, he was found to be at his baseline. He had a UA done that was unremarkable and a CXR that showed "pulmonary vascular congestion with patchy bibasilar infriltrates"- copy in pt's chart. And he was started on doxycycline yesterday, unclear how many doses he took. Pt with a chronic cough, did not notice anything like increased cough or sputum production from baseline. No witnessed episodes of shortness of breath, no increase in baseline oxygen requirement. No known fevers. Meghan believes patient has a history of heart failure and takes lasix regularly, however, heart failure not listed as a diagnosis from NH or [facility] outpatient pulm notes, lasix also not listed on medication list from NH or surescripts.   Pt himself currently feels well. Denies having any trouble breathing, chest pain, abdominal pain, or any other complaints.     In ED:  On arrivalL afebrile, hemodynamically stable, saturating 88% on 4L NC, briefly required 6L but now weaned back down to 4%, saturating >90%.   Labs notable for leukocytosis to 12k, elevated proBNP 3351, VBG with pH 7.37/50, lactate 2.6  UA negative for infection  CXR with reticular opacities over bibasilar lungs on prelim read    Given CTX and azithromycin.   Admitted to medicine for further evaluation.  (25 Sep 2024 22:23)    PAST MEDICAL & SURGICAL HISTORY:  Advanced COPD  Chronic respiratory failure with hypoxia  CAD (coronary artery disease)  History of myocardial infarction  Chronic cough  HLD (hyperlipidemia)  BPH (benign prostatic hyperplasia)  No significant past surgical history    MEDICATIONS  (STANDING):  albuterol/ipratropium for Nebulization. 3 milliLiter(s) Nebulizer once  atorvastatin 10 milliGRAM(s) Oral at bedtime  cefTRIAXone   IVPB 1000 milliGRAM(s) IV Intermittent every 24 hours  clopidogrel Tablet 75 milliGRAM(s) Oral daily  diltiazem    Tablet 30 milliGRAM(s) Oral every 6 hours  enoxaparin Injectable 40 milliGRAM(s) SubCutaneous every 24 hours  finasteride 5 milliGRAM(s) Oral daily  furosemide    Tablet 40 milliGRAM(s) Oral once  predniSONE   Tablet 10 milliGRAM(s) Oral daily  senna 2 Tablet(s) Oral at bedtime  tamsulosin 0.4 milliGRAM(s) Oral at bedtime    Allergies    No Known Allergies    Intolerances    FAMILY HISTORY:  No pertinent family history in first degree relatives      Non-contributary for premature coronary disease or sudden cardiac death    SOCIAL HISTORY:    [ x] Non-smoker  [ ] Smoker  [ ] Alcohol    PHYSICAL EXAM:  T(C): 36.6 (09-28-24 @ 11:57), Max: 36.6 (09-28-24 @ 11:57)  HR: 89 (09-28-24 @ 11:57) (70 - 141)  BP: 154/83 (09-28-24 @ 11:57) (96/54 - 154/84)  RR: 18 (09-28-24 @ 11:57) (18 - 19)  SpO2: 92% (09-28-24 @ 11:57) (92% - 99%)  Wt(kg): --    Appearance: elderly man, sleepy and confused.	  HEENT:   Normal oral mucosa, PERRL, EOMI	  Lymphatic: No lymphadenopathy , no edema  Cardiovascular: Normal S1 S2, No JVD, No murmurs , Peripheral pulses palpable 2+ bilaterally  Respiratory: Lungs clear to auscultation, normal effort 	  Gastrointestinal:  Soft, Non-tender, + BS	  Skin: No rashes, No ecchymoses, No cyanosis, warm to touch  Musculoskeletal: Normal range of motion, normal strength  Psychiatry:  Mood & affect appropriate      TELEMETRY: AFib/AFlutter	    Echo: < from: TTE W or WO Ultrasound Enhancing Agent (09.26.24 @ 15:20) >     1. Left ventricular systolic function is normal with an ejection fraction of 69 % by Alex's method ofdisks.   2. Enlarged right ventricular cavity size and reduced right ventricular systolic function.   3. Left atrium is normal in size.   4. The aortic valve appears trileaflet with reduced systolic excursion. There is moderate aortic stenosis. The peak transaortic velocity is 2.37 m/s, peak transaortic gradient is 22.5 mmHg and mean transaortic gradient is 11.0 mmHg with an LVOT/aortic valve VTI ratio of 0.39. The aortic valve area is estimated at 1.21 cm² by the continuity equation.   5. Estimated pulmonary artery systolic pressure is 57 mmHg, consistent with moderate pulmonary hypertension.   6. No pericardial effusion seen.    < end of copied text >  	  LABS:	 	                          14.0   10.83 )-----------( 241      ( 28 Sep 2024 07:48 )             43.1     09-28    142  |  105  |  16  ----------------------------<  130[H]  4.2   |  24  |  0.88    Ca    9.0      28 Sep 2024 07:48  Phos  2.8     09-28  Mg     2.10     09-28      ASSESSMENT/PLAN: Mr Barajas is a 89y Male here from nursing home w/ shortness of breath.  Has COPD, may have superimposed pneumonia.  New dx of atrial arrhythmia, may be causing acute diastolic heart failure.  Continue AV aniket blockade w/ diltiazem.  Room to increase, with holding parameters for SBP<100bpm.  Backup plan is a Digoxin load.  Awaiting remainder of workup and goals of care before starting oral anticoagulation for stroke prevention.  Will follow with you.    Tarik Marin M.D.  Cardiac Electrophysiology    office [phone]  pager [phone]
  09-28-24 @ 10:31    Patient is a 89y old  Male who presents with a chief complaint of acute metabolic encephalopathy (28 Sep 2024 06:55)      HPI:  89M with PMH cognitive dysfunction (AAOx2 at baseline), advanced COPD on daily prednisone with chronic respiratory failure on 4L NC, CAD/MI s/p stent, HLD, and BPH BIBEMS from [facility] for confusion in setting of suspected pneumonia. Pt does not know why he was brought to the hospital, unable to provide much history. Collateral obtained from daughter/HCP Meghan Gonzalez. Pt noted to have intermittent episodes of confusion by NH staff over the past 2 days, occasionally become agitated, saying nonsensical things, all unlike him. Pt is AAOx2 at baseline, would not know the year, but otherwise conversational. Whenever he was visited by daughter or son though, he was found to be at his baseline. He had a UA done that was unremarkable and a CXR that showed "pulmonary vascular congestion with patchy bibasilar infriltrates"- copy in pt's chart. And he was started on doxycycline yesterday, unclear how many doses he took. Pt with a chronic cough, did not notice anything like increased cough or sputum production from baseline. No witnessed episodes of shortness of breath, no increase in baseline oxygen requirement. No known fevers. Meghan believes patient has a history of heart failure and takes lasix regularly, however, heart failure not listed as a diagnosis from NH or [facility] outpatient pulm notes, [facility]   Pt himself currently feels well. Denies having any trouble breathing, chest pain, abdominal pain, or any other complaints.     In ED:  On arrival afebrile, hemodynamically stable, saturating 88% on 4L NC, briefly required 6L but now weaned back down to 4%, saturating >90%.   Labs notable for leukocytosis to 12k, elevated proBNP 3351, VBG with pH 7.37/50, lactate 2.6  UA negative for infection  CXR with reticular opacities over bibasilar lungs on prelim read    Given CTX and azithromycin.   Admitted to medicine for further evaluation.  (25 Sep 2024 22:23)    called his daughter to get more information:  he has copd and has been on chr steroids  he does tell me that he smokes;         ?FOLLOWING PRESENT  [ x] Hx of PE/DVT, [ y] Hx COPD, [ x] Hx of Asthma, [y ] Hx of Hospitalization, [ ]x  Hx of BiPAP/CPAP use, [ x] Hx of GERMAIN    Allergies    No Known Allergies    Intolerances        PAST MEDICAL & SURGICAL HISTORY:  Advanced COPD      Chronic respiratory failure with hypoxia      CAD (coronary artery disease)      History of myocardial infarction      Chronic cough      HLD (hyperlipidemia)      BPH (benign prostatic hyperplasia)      No significant past surgical history          FAMILY HISTORY:  No pertinent family history in first degree relatives        Social History: [ 40 pk yrs;  worked with [employer] ] TOBACCO                  [ x ] ETOH                                 [ x ] IVDA/DRUGS    REVIEW OF SYSTEMS    pt cant tell me   General:	    Skin/Breast:  	  Ophthalmologic:  	  ENMT:	    Respiratory and Thorax:  	  Cardiovascular:	    Gastrointestinal:	    Genitourinary:	    Musculoskeletal:	    Neurological:	    Psychiatric:	    Hematology/Lymphatics:	    Endocrine:	    Allergic/Immunologic:	    MEDICATIONS  (STANDING):  albuterol/ipratropium for Nebulization. 3 milliLiter(s) Nebulizer once  atorvastatin 10 milliGRAM(s) Oral at bedtime  azithromycin  IVPB 500 milliGRAM(s) IV Intermittent every 24 hours  cefTRIAXone   IVPB 1000 milliGRAM(s) IV Intermittent every 24 hours  clopidogrel Tablet 75 milliGRAM(s) Oral daily  diltiazem    Tablet 30 milliGRAM(s) Oral every 6 hours  enoxaparin Injectable 40 milliGRAM(s) SubCutaneous every 24 hours  finasteride 5 milliGRAM(s) Oral daily  furosemide    Tablet 40 milliGRAM(s) Oral once  predniSONE   Tablet 10 milliGRAM(s) Oral daily  senna 2 Tablet(s) Oral at bedtime  tamsulosin 0.4 milliGRAM(s) Oral at bedtime    MEDICATIONS  (PRN):  acetaminophen     Tablet .. 650 milliGRAM(s) Oral every 6 hours PRN Temp greater or equal to 38C (100.4F), Mild Pain (1 - 3)  albuterol/ipratropium for Nebulization 3 milliLiter(s) Nebulizer every 6 hours PRN Shortness of Breath and/or Wheezing  aluminum hydroxide/magnesium hydroxide/simethicone Suspension 30 milliLiter(s) Oral every 4 hours PRN Dyspepsia  melatonin 3 milliGRAM(s) Oral at bedtime PRN Insomnia  ondansetron Injectable 4 milliGRAM(s) IV Push every 8 hours PRN Nausea and/or Vomiting       Vital Signs Last 24 Hrs  T(C): 36.3 (28 Sep 2024 05:31), Max: 36.6 (27 Sep 2024 12:29)  T(F): 97.3 (28 Sep 2024 05:31), Max: 97.9 (27 Sep 2024 12:29)  HR: 73 (28 Sep 2024 05:31) (70 - 141)  BP: 154/84 (28 Sep 2024 05:31) (96/54 - 154/84)  BP(mean): --  RR: 18 (28 Sep 2024 05:31) (18 - 19)  SpO2: 95% (28 Sep 2024 05:31) (95% - 99%)    Parameters below as of 28 Sep 2024 05:31  Patient On (Oxygen Delivery Method): nasal cannula    Orthostatic VS          I&O's Summary      Physical Exam:   GENERAL: NAD, well-groomed, well-developed  HEENT: SUGEY/   Atraumatic, Normocephalic  ENMT: No tonsillar erythema, exudates, or enlargement; Moist mucous membranes, Good dentition, No lesions  NECK: Supple, No JVD, Normal thyroid  CHEST/LUNG: poor air entry   CVS: Regular rate and rhythm; No murmurs, rubs, or gallops  GI: : Soft, Nontender, Nondistended; Bowel sounds present  NERVOUS SYSTEM:  Alert & Oriented X0  EXTREMITIES:  - edema  LYMPH: No lymphadenopathy noted  SKIN: No rashes or lesions  ENDOCRINOLOGY: No Thyromegaly  PSYCH: slightly agitated     Labs:  3.2<45<4>>147<<7.415>>3.2<<3><<4><<5<<1479>>, 2.6<50<4>>28<<7.375>>2.6<<3><<4><<5<<289>>                            14.0   10.83 )-----------( 241      ( 28 Sep 2024 07:48 )             43.1                         12.9   8.05  )-----------( 195      ( 27 Sep 2024 07:20 )             39.8                         13.1   10.01 )-----------( 215      ( 26 Sep 2024 07:00 )             40.4                         14.8   12.21 )-----------( 262      ( 25 Sep 2024 14:44 )             44.7     09-28    142  |  105  |  16  ----------------------------<  130[H]  4.2   |  24  |  0.88  09-27    139  |  104  |  14  ----------------------------<  100[H]  4.3   |  21[L]  |  0.76  09-26    140  |  104  |  14  ----------------------------<  95  4.4   |  24  |  0.74  09-25    140  |  102  |  15  ----------------------------<  125[H]  5.0   |  26  |  0.84    Ca    9.0      28 Sep 2024 07:48  Ca    8.5      27 Sep 2024 07:20  Phos  2.8     09-28  Phos  3.5     09-27  Mg     2.10     09-28  Mg     2.00     09-27    TPro  7.0  /  Alb  3.7  /  TBili  0.6  /  DBili  x   /  AST  14  /  ALT  13  /  AlkPhos  85  09-25    CAPILLARY BLOOD GLUCOSE            Urinalysis Basic - ( 28 Sep 2024 07:48 )    Color: x / Appearance: x / SG: x / pH: x  Gluc: 130 mg/dL / Ketone: x  / Bili: x / Urobili: x   Blood: x / Protein: x / Nitrite: x   Leuk Esterase: x / RBC: x / WBC x   Sq Epi: x / Non Sq Epi: x / Bacteria: x      Culture - Blood (collected 25 Sep 2024 20:25)  Source: .Blood BLOOD  Preliminary Report (28 Sep 2024 09:01):    No growth at 48 Hours    Culture - Blood (collected 25 Sep 2024 20:10)  Source: .Blood BLOOD  Preliminary Report (28 Sep 2024 09:01):    No growth at 48 Hours    Urinalysis with Rflx Culture (collected 25 Sep 2024 13:50)      D DImer  Procalcitonin: 0.04 ng/mL (09-25 @ 20:25)      Studies  Chest X-RAY  CT SCAN Chest   CT Abdomen  Venous Dopplers: LE:   Others      rad< from: Xray Chest 1 View AP/PA (09.25.24 @ 17:52) >      INTERPRETATION:  EXAMINATION: XR CHEST    CLINICAL INDICATION: ams    COMPARISON: None.    TECHNIQUE: Frontal view of the chest.    FINDINGS:    Reticular opacities in the bibasilar lungs. No pleural effusion. No   pneumothorax.  The heart size is normal.  No acute osseous abnormalities.    IMPRESSION:  No acute pulmonary pathology.  Reticular opacities in the bibasilar lungs likely reflecting interstitial   lung disease.      --- End of Report ---          RONAK SANDS MD; Resident Radiologist  This document has been electronically signed.  TAYE BORGES MD; Attending Radiologist  This document has been electronically signed. Sep 26 2024  7:06AM    < end of copied text >        DCT    echo< from: TTE W or WO Ultrasound Enhancing Agent (09.26.24 @ 15:20) >      1. Left ventricular systolic function is normal with an ejection fraction of 69 % by Alex's method ofdisks.   2. Enlarged right ventricular cavity size and reduced right ventricular systolic function.   3. Left atrium is normal in size.   4. The aortic valve appears trileaflet with reduced systolic excursion. There is moderate aortic stenosis. The peak transaortic velocity is 2.37 m/s, peak transaortic gradient is 22.5 mmHg and mean transaortic gradient is 11.0 mmHg with an LVOT/aortic valve VTI ratio of 0.39. The aortic valve area is estimated at 1.21 cm² by the continuity equation.   5. Estimated pulmonary artery systolic pressure is 57 mmHg, consistent with moderate pulmonary hypertension.   6. No pericardial effusion seen.    < end of copied text >    
J[facility], Division of Infectious Diseases  DAV Rudd S. Shah, Y. Patel, G. Osman  [phone]  ([phone] - weekdays after 5pm and weekends)    MICHELLE CHEUNG  89y, Male  [id number]    HPI--  HPI:  89 y/o M PMhx COPD on daily prednisone with chronic respiratory failure on 4L NC, CAD s/p stent, BPH, AAOx2 at baseline who presented from nursing home for confusion. Pt does not know why he was brought to the hospital and unable to provide much history. Per notes patient w/ intermittent episodes of confusion for past 2 days w/ occasional agitation. He had a UA done that was unremarkable and a CXR c/f pulmonary vascular congestion with patchy bibasilar infiltrates. Was started on doxycycline.   He denies fever, chills, chest pain, SOB, cough, nasal congestion, sore throat, rhinorrhea, abd pain, n/v, diarrhea, dysuria.   In ED labs w/ mild leukocytosis. He was started on ceftriaxone and zosyn.  Patient unable to provide history therefore history obtained from chart review.     ROS: 10 point review of systems completed, pertinent positives and negatives as per HPI.    Allergies: No Known Allergies    PMH -- Advanced COPD    Chronic respiratory failure with hypoxia    CAD (coronary artery disease)    History of myocardial infarction    Chronic cough    HLD (hyperlipidemia)    BPH (benign prostatic hyperplasia)      PSH -- No significant past surgical history      FH -- No pertinent family history in first degree relatives      Social History -- former smoker, quit age 50s, drinks socially, denies illicit drug use    Physical Exam--  Vital Signs Last 24 Hrs  T(F): 97.1 (26 Sep 2024 11:31), Max: 98.8 (25 Sep 2024 20:00)  HR: 70 (26 Sep 2024 11:31) (70 - 88)  BP: 125/61 (26 Sep 2024 11:31) (109/77 - 138/57)  RR: 18 (26 Sep 2024 11:31) (18 - 20)  SpO2: 99% (26 Sep 2024 11:31) (88% - 99%)  General: nontoxic-appearing, no acute distress  HEENT: anicteric, NC  Lungs: Clear bilaterally  Heart: S1, S2, normal rate  Abdomen: Soft. Nondistended. Nontender.   Neuro: AAOx2  Back: No costovertebral angle tenderness.  Extremities: trace b/l ankle edema.   Skin: Warm. Dry. No rash.  Psychiatric: Appropriate affect and mood for situation.     Laboratory & Imaging Data--  CBC:                       13.1   10.01 )-----------( 215      ( 26 Sep 2024 07:00 )             40.4     WBC Count: 10.01 K/uL (09-26-24 @ 07:00)  WBC Count: 12.21 K/uL (09-25-24 @ 14:44)    CMP: 09-26    140  |  104  |  14  ----------------------------<  95  4.4   |  24  |  0.74    Ca    8.6      26 Sep 2024 07:00  Phos  3.5     09-26  Mg     2.10     09-26    TPro  7.0  /  Alb  3.7  /  TBili  0.6  /  DBili  x   /  AST  14  /  ALT  13  /  AlkPhos  85  09-25    LIVER FUNCTIONS - ( 25 Sep 2024 14:44 )  Alb: 3.7 g/dL / Pro: 7.0 g/dL / ALK PHOS: 85 U/L / ALT: 13 U/L / AST: 14 U/L / GGT: x           Urinalysis Basic - ( 26 Sep 2024 07:00 )    Color: x / Appearance: x / SG: x / pH: x  Gluc: 95 mg/dL / Ketone: x  / Bili: x / Urobili: x   Blood: x / Protein: x / Nitrite: x   Leuk Esterase: x / RBC: x / WBC x   Sq Epi: x / Non Sq Epi: x / Bacteria: x      Microbiology:     Urinalysis with Rflx Culture (collected 09-25-24 @ 13:50)        Radiology--  ***  Active Medications--  acetaminophen     Tablet .. 650 milliGRAM(s) Oral every 6 hours PRN  albuterol/ipratropium for Nebulization 3 milliLiter(s) Nebulizer every 6 hours PRN  albuterol/ipratropium for Nebulization. 3 milliLiter(s) Nebulizer once  aluminum hydroxide/magnesium hydroxide/simethicone Suspension 30 milliLiter(s) Oral every 4 hours PRN  atorvastatin 10 milliGRAM(s) Oral at bedtime  azithromycin  IVPB 500 milliGRAM(s) IV Intermittent every 24 hours  cefTRIAXone   IVPB 1000 milliGRAM(s) IV Intermittent every 24 hours  clopidogrel Tablet 75 milliGRAM(s) Oral daily  enoxaparin Injectable 40 milliGRAM(s) SubCutaneous every 24 hours  finasteride 5 milliGRAM(s) Oral daily  furosemide    Tablet 40 milliGRAM(s) Oral once  melatonin 3 milliGRAM(s) Oral at bedtime PRN  ondansetron Injectable 4 milliGRAM(s) IV Push every 8 hours PRN  predniSONE   Tablet 10 milliGRAM(s) Oral daily  senna 2 Tablet(s) Oral at bedtime  tamsulosin 0.4 milliGRAM(s) Oral at bedtime    Antimicrobials:   azithromycin  IVPB 500 milliGRAM(s) IV Intermittent every 24 hours  cefTRIAXone   IVPB 1000 milliGRAM(s) IV Intermittent every 24 hours    Immunologic:   
date of consult 9/27/24    HISTORY OF PRESENT ILLNESS: HPI:    89M with PMH cognitive dysfunction (AAOx2 at baseline), advanced COPD on daily prednisone with chronic respiratory failure on 4L NC, CAD/MI s/p stent 15 years ago, HLD, and BPH BIBEMS from [facility] for confusion in setting of suspected pneumonia.     Pt does not know why he was brought to the hospital, unable to provide much history. Collateral obtained from daughter/HCP Meghan Gonzalez. Pt noted to have intermittent episodes of confusion by NH staff over the past 2 days, occasionally become agitated, saying nonsensical things, all unlike him. Pt is AAOx2 at baseline, would not know the year, but otherwise conversational. Whenever he was visited by daughter or son though, he was found to be at his baseline. He had a UA done that was unremarkable and a CXR that showed "pulmonary vascular congestion with patchy bibasilar infriltrates"- copy in pt's chart. And he was started on doxycycline yesterday, unclear how many doses he took. Pt with a chronic cough, did not notice anything like increased cough or sputum production from baseline. No witnessed episodes of shortness of breath, no increase in baseline oxygen requirement. No known fevers. Meghan believes patient has a history of heart failure and takes lasix regularly, however, heart failure not listed as a diagnosis from NH or [facility] outpatient pulm notes, lasix also not listed on medication list from NH or surescripts.   Pt himself currently feels well. Denies having any trouble breathing, chest pain, abdominal pain, or any other complaints.     Being treated for PNA, reported agitation and confusion.  Noted to be in rapid Aflutter when tele replaced this afternoon.  Son and daughter at bedside.  report no hx arryhtmia or stroke.  on plavix since MI/PCI 15 years ago.  Was at [facility] for a month before [facility], with COVID PNA and found to have 11cm lung mass at that time.    PAST MEDICAL & SURGICAL HISTORY:  Advanced COPD      Chronic respiratory failure with hypoxia      CAD (coronary artery disease)      History of myocardial infarction      Chronic cough      HLD (hyperlipidemia)      BPH (benign prostatic hyperplasia)      No significant past surgical history    MEDICATIONS  (STANDING):  albuterol/ipratropium for Nebulization. 3 milliLiter(s) Nebulizer once  atorvastatin 10 milliGRAM(s) Oral at bedtime  azithromycin  IVPB 500 milliGRAM(s) IV Intermittent every 24 hours  cefTRIAXone   IVPB 1000 milliGRAM(s) IV Intermittent every 24 hours  clopidogrel Tablet 75 milliGRAM(s) Oral daily  diltiazem    Tablet 30 milliGRAM(s) Oral every 6 hours  enoxaparin Injectable 40 milliGRAM(s) SubCutaneous every 24 hours  finasteride 5 milliGRAM(s) Oral daily  furosemide    Tablet 40 milliGRAM(s) Oral once  predniSONE   Tablet 10 milliGRAM(s) Oral daily  senna 2 Tablet(s) Oral at bedtime  tamsulosin 0.4 milliGRAM(s) Oral at bedtime      Allergies  No Known Allergies      FAMILY HISTORY:  No pertinent family history in first degree relatives  Noncontributory for premature coronary disease or sudden cardiac death    SOCIAL HISTORY:    [x ] Non-smoker  [ ] Smoker  [ ] Alcohol    FLU VACCINE THIS YEAR STARTS IN AUGUST:  [ ] Yes    [ ] No    IF OVER 65 HAVE YOU EVER HAD A PNA VACCINE:  [ ] Yes    [ ] No       [ ] N/A      REVIEW OF SYSTEMS:  [ ]chest pain  [x ]shortness of breath  [  ]palpitations  [  ]syncope  [ ]near syncope [ ]upper extremity weakness   [ ] lower extremity weakness  [  ]diplopia  [  ]altered mental status   [  ]fevers  [ ]chills [ ]nausea  [ ]vomiting  [  ]dysphagia    [ ]abdominal pain  [ ]melena  [ ]BRBPR    [  ]epistaxis  [  ]rash    [ ]lower extremity edema        [ x] All others negative	  [ ] Unable to obtain      LABS:	 	    CARDIAC MARKERS:  Troponin T, High Sensitivity Result: 33, 33                          12.9   8.05  )-----------( 195      ( 27 Sep 2024 07:20 )             39.8     Hb Trend: 12.9<--    09-27    139  |  104  |  14  ----------------------------<  100[H]  4.3   |  21[L]  |  0.76    Ca    8.5      27 Sep 2024 07:20  Phos  3.5     09-27  Mg     2.00     09-27      Creatinine Trend: 0.76<--, 0.74<--, 0.84<--    PHYSICAL EXAM:  T(C): 36.5 (09-27-24 @ 13:48), Max: 36.6 (09-27-24 @ 12:29)  HR: 141 (09-27-24 @ 14:26) (63 - 141)  BP: 101/61 (09-27-24 @ 14:26) (99/56 - 127/80)  RR: 19 (09-27-24 @ 13:48) (18 - 19)  SpO2: 96% (09-27-24 @ 13:48) (95% - 98%)  Wt(kg): --   BMI (kg/m2): 25.1 (09-26-24 @ 12:00)  I&O's Summary    26 Sep 2024 07:01  -  27 Sep 2024 07:00  --------------------------------------------------------  IN: 400 mL / OUT: 700 mL / NET: -300 mL      Gen: Appears well in NAD  HEENT:  (-)icterus (-)pallor  CV: N S1 S2 1/6 AMA (+)2 Pulses B/l  Resp:  decreased BS bases  GI: (+) BS Soft, NT, ND  Lymph:  (-)Edema, (-)obvious lymphadenopathy  Skin: Warm to touch, Normal turgor  Psych: cannot assess      TELEMETRY: 	  AF up to 140s    ECG:  	AF 140s    < from: TTE W or WO Ultrasound Enhancing Agent (09.26.24 @ 15:20) >     CONCLUSIONS:      1. Left ventricular systolic function is normal with an ejection fraction of 69 % by Alex's method ofdisks.   2. Enlarged right ventricular cavity size and reduced right ventricular systolic function.   3. Left atrium is normal in size.   4. The aortic valve appears trileaflet with reduced systolic excursion. There is moderate aortic stenosis. The peak transaortic velocity is 2.37 m/s, peak transaortic gradient is 22.5 mmHg and mean transaortic gradient is 11.0 mmHg with an LVOT/aortic valve VTI ratio of 0.39. The aortic valve area is estimated at 1.21 cm² by the continuity equation.   5. Estimated pulmonary artery systolic pressure is 57 mmHg, consistent with moderate pulmonary hypertension.   6. No pericardial effusion seen.  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 Cardiology consulted for New onset Rapid Afib /?flutter today    --TTE noted with Normal LVEF, mod AS, mod Pulm HTN  --Change to Cardizem 30 Q 6 for rate control  --Despite [id number] of atleast 3, given AMS/agitation/?lung mass, hold off on full Ac for now  --family requests repeat chest imaging/ CT chest pending  --F/U blood cultures    Jessica VAZQUEZ  [phone]

## 2024-09-28 NOTE — CONSULT NOTE ADULT - REASON FOR ADMISSION
acute metabolic encephalopathy

## 2024-09-28 NOTE — CONSULT NOTE ADULT - ASSESSMENT
89M with PMH cognitive dysfunction (AAOx2 at baseline), advanced COPD on daily prednisone with chronic respiratory failure on 4L NC, CAD/MI s/p stent, HLD, and BPH BIBEMS from [facility] for confusion in setting of suspected pneumonia. Pt does not know why he was brought to the hospital, unable to provide much history. Collateral obtained from daughter/HCP Meghan Gonzalez. Pt noted to have intermittent episodes of confusion by NH staff over the past 2 days, occasionally become agitated, saying nonsensical things, all unlike him. Pt is AAOx2 at baseline, would not know the year, but otherwise conversational. Whenever he was visited by daughter or son though, he was found to be at his baseline. He had a UA done that was unremarkable and a CXR that showed "pulmonary vascular congestion with patchy bibasilar infriltrates"- copy in pt's chart. And he was started on doxycycline yesterday, unclear how many doses he took. Pt with a chronic cough, did not notice anything like increased cough or sputum production from baseline. No witnessed episodes of shortness of breath, no increase in baseline oxygen requirement. No known fevers. Meghan believes patient has a history of heart failure and takes lasix regularly, however, heart failure not listed as a diagnosis from NH or [facility] outpatient pulm notes, lasix also not listed on medication list from NH or surescripts.   Pt himself currently feels well. Denies having any trouble breathing, chest pain, abdominal pain, or any other complaints.     Acute metabolic encephalopathy.   -check repeat CT head -NOT DONE YET;  WAS CANCELLED   - could be related to infection?  pneumonia,  uti etc:  being rule dout  - his VBG is fine with no retention of co2:  so thats not the reason of him getting more confused;     Mild leukocytosis to 12k on admission  - infectious vs reactive. afebrile, not septic, hemodynamically stable.   -Without any new fevers,   -URI sxs, worsening of chronic cough, or increased O2 requirements  - Procal negative.  - cw abx as per ID  ; to cont for now;   -needs ct chest     Advanced COPD.   -ow suspicion for COPD exacerbation, lung exam with localized rhonchi/coarse crackles over R>L base. No increased baseline O2 requirement or worsening of chronic cough  - continue home prednisone 10mg daily   - duonebs PRN.  -and his VBg also did not show any  significant retention ofpco2:     Chronic hypoxic respiratory failure.   ·  Plan: - continue home NC at 4L NC, goal 88-92&-likely due to copd     CAD (coronary artery disease).    - continue home statin, plavix.  -NEW aflutter   -echo showed;  1. Left ventricular systolic function is normal with an ejection fraction of 69 % by Alex's method ofdisks.   2. Enlarged right ventricular cavity size and reduced right ventricular systolic function.   3. Left atrium is normal in size.   4. The aortic valve appears trileaflet with reduced systolic excursion. There is moderate aortic stenosis. The peak transaortic velocity is 2.37 m/s, peak transaortic gradient is 22.5 mmHg and mean transaortic gradient is 11.0 mmHg with an LVOT/aortic valve VTI ratio of 0.39. The aortic valve area is estimated at 1.21 cm² by the continuity equation.   5. Estimated pulmonary artery systolic pressure is 57 mmHg, consistent with moderate pulmonary hypertension.   6. No pericardial effusion seen.      dw acp and pts daughter 
89 y/o M PMhx COPD on daily prednisone with chronic respiratory failure on 4L NC, CAD s/p stent, BPH, AAOx2 at baseline who presented from nursing home for confusion    r/o PNA  chronic hypoxic resp failure- on baseline 4L NC  leukocytosis- resolved  AMS- resolved, appears back to baseline A&Ox 2  afebrile  procal negative  SARS-CoV-2/ flu/ RSV PCR negative  CXR- Reticular opacities in the bibasilar lungs likely reflecting interstitial lung disease.  he denies URI or resp symptoms though he is a poor historian    Recommendations  lower suspicion for PNA at this time   c/w ceftriaxone 1g daily/ azithromycin pending CT chest  f/u blood cultures    Jesse Brewer M.D.  [facility], Division of Infectious Diseases  [phone]  After 5pm on weekdays and all day on weekends - please call [phone]

## 2024-09-28 NOTE — PROGRESS NOTE ADULT - ASSESSMENT
89M with PMH cognitive dysfunction (AAOx2 at baseline), advanced COPD on daily prednisone with chronic respiratory failure on 4L NC, CAD/MI s/p stent, HLD, and BPH BIBEMS from [facility] for confusion x 2 days. Chest imaging suggestive of pulmonary edema vs pneumonia.         Problem/Plan - 1:  ·  Problem: Acute metabolic encephalopathy.   ·  Plan: check repeat CT head   - without any focal deficits to suggest structural etiology- treatment for infection vs edema as below   - frequent reorientation  - maintain normal sleep/wake cycles (avoid waking patient between 11PM and 6AM unless medically necessary).    Problem/Plan - 2:  ·  Problem: Leukocytosis.   ·  Plan: mild leukocytosis to 12k on admission, infectious vs reactive. afebrile, not septic, hemodynamically stable. Without any new fevers, URI sxs, worsening of chronic cough, or increased O2 requirements. Procal negative. Favoring pulmonary edema over pneumonia at this time at this time   - ID fu   - cw abx as per ID     Problem/Plan - 3:·  Problem: Advanced COPD.   ·  Plan: low suspicion for COPD exacerbation, lung exam with localized rhonchi/coarse crackles over R>L base. No increased baseline O2 requirement or worsening of chronic cough  - continue home prednisone 10mg daily   - duonebs PRN.    Problem/Plan - 4:  ·  Problem: Chronic hypoxic respiratory failure.   ·  Plan: - continue home NC at 4L NC, goal 88-92&.    Problem/Plan - 5:  ·  Problem: CAD (coronary artery disease).   ·  Plan: - continue home statin, plavix.    Problem/Plan - 6:  ·  Problem: BPH (benign prostatic hyperplasia).   ·  Plan: - continue home finasteride and tamsulosin.    Problem/Plan - 7:  ·  Problem: nEW aflutter   ·  Plan: cw tele  - sp BB  - cards fu

## 2024-09-28 NOTE — PROGRESS NOTE ADULT - SUBJECTIVE AND OBJECTIVE BOX
Patient is a 89y old  Male who presents with a chief complaint of acute metabolic encephalopathy (28 Sep 2024 13:31)    Date of servie : 09-28-24 @ 16:58  INTERVAL HPI/OVERNIGHT EVENTS:  T(C): 36.6 (09-28-24 @ 11:57), Max: 36.6 (09-28-24 @ 11:57)  HR: 89 (09-28-24 @ 11:57) (70 - 89)  BP: 154/83 (09-28-24 @ 11:57) (96/54 - 154/84)  RR: 18 (09-28-24 @ 11:57) (18 - 18)  SpO2: 92% (09-28-24 @ 11:57) (92% - 99%)  Wt(kg): --  I&O's Summary      LABS:                        14.0   10.83 )-----------( 241      ( 28 Sep 2024 07:48 )             43.1     09-28    142  |  105  |  16  ----------------------------<  130[H]  4.2   |  24  |  0.88    Ca    9.0      28 Sep 2024 07:48  Phos  2.8     09-28  Mg     2.10     09-28        Urinalysis Basic - ( 28 Sep 2024 07:48 )    Color: x / Appearance: x / SG: x / pH: x  Gluc: 130 mg/dL / Ketone: x  / Bili: x / Urobili: x   Blood: x / Protein: x / Nitrite: x   Leuk Esterase: x / RBC: x / WBC x   Sq Epi: x / Non Sq Epi: x / Bacteria: x      CAPILLARY BLOOD GLUCOSE            Urinalysis Basic - ( 28 Sep 2024 07:48 )    Color: x / Appearance: x / SG: x / pH: x  Gluc: 130 mg/dL / Ketone: x  / Bili: x / Urobili: x   Blood: x / Protein: x / Nitrite: x   Leuk Esterase: x / RBC: x / WBC x   Sq Epi: x / Non Sq Epi: x / Bacteria: x        MEDICATIONS  (STANDING):  albuterol/ipratropium for Nebulization. 3 milliLiter(s) Nebulizer once  atorvastatin 10 milliGRAM(s) Oral at bedtime  cefTRIAXone   IVPB 1000 milliGRAM(s) IV Intermittent every 24 hours  clopidogrel Tablet 75 milliGRAM(s) Oral daily  diltiazem    Tablet 30 milliGRAM(s) Oral every 6 hours  enoxaparin Injectable 40 milliGRAM(s) SubCutaneous every 24 hours  finasteride 5 milliGRAM(s) Oral daily  furosemide    Tablet 40 milliGRAM(s) Oral once  predniSONE   Tablet 10 milliGRAM(s) Oral daily  senna 2 Tablet(s) Oral at bedtime  tamsulosin 0.4 milliGRAM(s) Oral at bedtime    MEDICATIONS  (PRN):  acetaminophen     Tablet .. 650 milliGRAM(s) Oral every 6 hours PRN Temp greater or equal to 38C (100.4F), Mild Pain (1 - 3)  albuterol/ipratropium for Nebulization 3 milliLiter(s) Nebulizer every 6 hours PRN Shortness of Breath and/or Wheezing  aluminum hydroxide/magnesium hydroxide/simethicone Suspension 30 milliLiter(s) Oral every 4 hours PRN Dyspepsia  melatonin 3 milliGRAM(s) Oral at bedtime PRN Insomnia  ondansetron Injectable 4 milliGRAM(s) IV Push every 8 hours PRN Nausea and/or Vomiting          PHYSICAL EXAM:  GENERAL: NAD, well-groomed, well-developed  HEAD:  Atraumatic, Normocephalic  CHEST/LUNG: Clear to percussion bilaterally; No rales, rhonchi, wheezing, or rubs  HEART: Regular rate and rhythm; No murmurs, rubs, or gallops  ABDOMEN: Soft, Nontender, Nondistended; Bowel sounds present  EXTREMITIES:  2+ Peripheral Pulses, No clubbing, cyanosis, or edema  LYMPH: No lymphadenopathy noted  SKIN: No rashes or lesions    Care Discussed with Consultants/Other Providers [ ] YES  [ ] NO

## 2024-09-28 NOTE — PROGRESS NOTE ADULT - SUBJECTIVE AND OBJECTIVE BOX
pt seen and examined, no complaints, ROS - .        acetaminophen     Tablet .. 650 milliGRAM(s) Oral every 6 hours PRN  albuterol/ipratropium for Nebulization 3 milliLiter(s) Nebulizer every 6 hours PRN  albuterol/ipratropium for Nebulization. 3 milliLiter(s) Nebulizer once  aluminum hydroxide/magnesium hydroxide/simethicone Suspension 30 milliLiter(s) Oral every 4 hours PRN  atorvastatin 10 milliGRAM(s) Oral at bedtime  azithromycin  IVPB 500 milliGRAM(s) IV Intermittent every 24 hours  cefTRIAXone   IVPB 1000 milliGRAM(s) IV Intermittent every 24 hours  clopidogrel Tablet 75 milliGRAM(s) Oral daily  diltiazem    Tablet 30 milliGRAM(s) Oral every 6 hours  enoxaparin Injectable 40 milliGRAM(s) SubCutaneous every 24 hours  finasteride 5 milliGRAM(s) Oral daily  furosemide    Tablet 40 milliGRAM(s) Oral once  melatonin 3 milliGRAM(s) Oral at bedtime PRN  ondansetron Injectable 4 milliGRAM(s) IV Push every 8 hours PRN  predniSONE   Tablet 10 milliGRAM(s) Oral daily  senna 2 Tablet(s) Oral at bedtime  tamsulosin 0.4 milliGRAM(s) Oral at bedtime                            12.9   8.05  )-----------( 195      ( 27 Sep 2024 07:20 )             39.8       Hemoglobin: 12.9 g/dL (09-27 @ 07:20)  Hemoglobin: 13.1 g/dL (09-26 @ 07:00)  Hemoglobin: 14.8 g/dL (09-25 @ 14:44)      09-27    139  |  104  |  14  ----------------------------<  100[H]  4.3   |  21[L]  |  0.76    Ca    8.5      27 Sep 2024 07:20  Phos  3.5     09-27  Mg     2.00     09-27      Creatinine Trend: 0.76<--, 0.74<--, 0.84<--    COAGS:           T(C): 36.3 (09-28-24 @ 05:31), Max: 36.6 (09-27-24 @ 12:29)  HR: 73 (09-28-24 @ 05:31) (70 - 141)  BP: 154/84 (09-28-24 @ 05:31) (96/54 - 154/84)  RR: 18 (09-28-24 @ 05:31) (18 - 19)  SpO2: 95% (09-28-24 @ 05:31) (95% - 99%)  Wt(kg): --    I&O's Summary    26 Sep 2024 07:01  -  27 Sep 2024 07:00  --------------------------------------------------------  IN: 400 mL / OUT: 700 mL / NET: -300 mL      Gen: Appears well in NAD  HEENT:  (-)icterus (-)pallor  CV: N S1 S2 1/6 AMA (+)2 Pulses B/l  Resp:  decreased BS bases  GI: (+) BS Soft, NT, ND  Lymph:  (-)Edema, (-)obvious lymphadenopathy  Skin: Warm to touch, Normal turgor  Psych: cannot assess      TELEMETRY: 	  AFib    < from: TTE W or WO Ultrasound Enhancing Agent (09.26.24 @ 15:20) >     CONCLUSIONS:      1. Left ventricular systolic function is normal with an ejection fraction of 69 % by Alex's method ofdisks.   2. Enlarged right ventricular cavity size and reduced right ventricular systolic function.   3. Left atrium is normal in size.   4. The aortic valve appears trileaflet with reduced systolic excursion. There is moderate aortic stenosis. The peak transaortic velocity is 2.37 m/s, peak transaortic gradient is 22.5 mmHg and mean transaortic gradient is 11.0 mmHg with an LVOT/aortic valve VTI ratio of 0.39. The aortic valve area is estimated at 1.21 cm² by the continuity equation.   5. Estimated pulmonary artery systolic pressure is 57 mmHg, consistent with moderate pulmonary hypertension.   6. No pericardial effusion seen.  < end of copied text >      ASSESSMENT/PLAN: 	  89M with PMH cognitive dysfunction (AAOx2 at baseline), advanced COPD on daily prednisone with chronic respiratory failure on 4L NC, CAD/MI s/p stent 15 years ago, HLD, and BPH BIBEMS from [facility] for confusion in setting of suspected pneumonia. Cardiology consulted for New onset Rapid Afib /?flutter today    --TTE noted with Normal LVEF, mod AS, mod Pulm HTN  --tolerating  Cardizem 30 Q 6 for rate control , hr overnight 70-80  --Despite [id number] of atleast 3, given AMS/agitation/?lung mass, hold off on full Ac for now  --family requests repeat chest imaging/ CT chest pending  -- blood cultures neg to date.                 DOS: 09/28/2024    pt seen and examined, no complaints, ROS - .        acetaminophen     Tablet .. 650 milliGRAM(s) Oral every 6 hours PRN  albuterol/ipratropium for Nebulization 3 milliLiter(s) Nebulizer every 6 hours PRN  albuterol/ipratropium for Nebulization. 3 milliLiter(s) Nebulizer once  aluminum hydroxide/magnesium hydroxide/simethicone Suspension 30 milliLiter(s) Oral every 4 hours PRN  atorvastatin 10 milliGRAM(s) Oral at bedtime  azithromycin  IVPB 500 milliGRAM(s) IV Intermittent every 24 hours  cefTRIAXone   IVPB 1000 milliGRAM(s) IV Intermittent every 24 hours  clopidogrel Tablet 75 milliGRAM(s) Oral daily  diltiazem    Tablet 30 milliGRAM(s) Oral every 6 hours  enoxaparin Injectable 40 milliGRAM(s) SubCutaneous every 24 hours  finasteride 5 milliGRAM(s) Oral daily  furosemide    Tablet 40 milliGRAM(s) Oral once  melatonin 3 milliGRAM(s) Oral at bedtime PRN  ondansetron Injectable 4 milliGRAM(s) IV Push every 8 hours PRN  predniSONE   Tablet 10 milliGRAM(s) Oral daily  senna 2 Tablet(s) Oral at bedtime  tamsulosin 0.4 milliGRAM(s) Oral at bedtime                            12.9   8.05  )-----------( 195      ( 27 Sep 2024 07:20 )             39.8       Hemoglobin: 12.9 g/dL (09-27 @ 07:20)  Hemoglobin: 13.1 g/dL (09-26 @ 07:00)  Hemoglobin: 14.8 g/dL (09-25 @ 14:44)      09-27    139  |  104  |  14  ----------------------------<  100[H]  4.3   |  21[L]  |  0.76    Ca    8.5      27 Sep 2024 07:20  Phos  3.5     09-27  Mg     2.00     09-27      Creatinine Trend: 0.76<--, 0.74<--, 0.84<--    COAGS:           T(C): 36.3 (09-28-24 @ 05:31), Max: 36.6 (09-27-24 @ 12:29)  HR: 73 (09-28-24 @ 05:31) (70 - 141)  BP: 154/84 (09-28-24 @ 05:31) (96/54 - 154/84)  RR: 18 (09-28-24 @ 05:31) (18 - 19)  SpO2: 95% (09-28-24 @ 05:31) (95% - 99%)  Wt(kg): --    I&O's Summary    26 Sep 2024 07:01  -  27 Sep 2024 07:00  --------------------------------------------------------  IN: 400 mL / OUT: 700 mL / NET: -300 mL      Gen: Appears well in NAD  HEENT:  (-)icterus (-)pallor  CV: N S1 S2 1/6 AMA (+)2 Pulses B/l  Resp:  decreased BS bases  GI: (+) BS Soft, NT, ND  Lymph:  (-)Edema, (-)obvious lymphadenopathy  Skin: Warm to touch, Normal turgor  Psych: cannot assess      TELEMETRY: 	  AFib    < from: TTE W or WO Ultrasound Enhancing Agent (09.26.24 @ 15:20) >     CONCLUSIONS:      1. Left ventricular systolic function is normal with an ejection fraction of 69 % by Alex's method ofdisks.   2. Enlarged right ventricular cavity size and reduced right ventricular systolic function.   3. Left atrium is normal in size.   4. The aortic valve appears trileaflet with reduced systolic excursion. There is moderate aortic stenosis. The peak transaortic velocity is 2.37 m/s, peak transaortic gradient is 22.5 mmHg and mean transaortic gradient is 11.0 mmHg with an LVOT/aortic valve VTI ratio of 0.39. The aortic valve area is estimated at 1.21 cm² by the continuity equation.   5. Estimated pulmonary artery systolic pressure is 57 mmHg, consistent with moderate pulmonary hypertension.   6. No pericardial effusion seen.  < end of copied text >      ASSESSMENT/PLAN: 	  89M with PMH cognitive dysfunction (AAOx2 at baseline), advanced COPD on daily prednisone with chronic respiratory failure on 4L NC, CAD/MI s/p stent 15 years ago, HLD, and BPH BIBEMS from [facility] for confusion in setting of suspected pneumonia. Cardiology consulted for New onset Rapid Afib /?flutter today    --TTE noted with Normal LVEF, mod AS, mod Pulm HTN  --tolerating  Cardizem 30 Q 6 for rate control , hr overnight 70-80  --Despite [id number] of atleast 3, given AMS/agitation/?lung mass, hold off on full Ac for now  --family requests repeat chest imaging/ CT chest pending  -- blood cultures neg to date.

## 2024-09-29 LAB
ANION GAP SERPL CALC-SCNC: 13 MMOL/L — SIGNIFICANT CHANGE UP (ref 7–14)
BASOPHILS # BLD AUTO: 0.05 K/UL — SIGNIFICANT CHANGE UP (ref 0–0.2)
BASOPHILS NFR BLD AUTO: 0.5 % — SIGNIFICANT CHANGE UP (ref 0–2)
BUN SERPL-MCNC: 12 MG/DL — SIGNIFICANT CHANGE UP (ref 7–23)
CALCIUM SERPL-MCNC: 8.9 MG/DL — SIGNIFICANT CHANGE UP (ref 8.4–10.5)
CHLORIDE SERPL-SCNC: 106 MMOL/L — SIGNIFICANT CHANGE UP (ref 98–107)
CO2 SERPL-SCNC: 24 MMOL/L — SIGNIFICANT CHANGE UP (ref 22–31)
CREAT SERPL-MCNC: 0.78 MG/DL — SIGNIFICANT CHANGE UP (ref 0.5–1.3)
EGFR: 83 ML/MIN/1.73M2 — SIGNIFICANT CHANGE UP
EOSINOPHIL # BLD AUTO: 0.13 K/UL — SIGNIFICANT CHANGE UP (ref 0–0.5)
EOSINOPHIL NFR BLD AUTO: 1.4 % — SIGNIFICANT CHANGE UP (ref 0–6)
GLUCOSE SERPL-MCNC: 131 MG/DL — HIGH (ref 70–99)
HCT VFR BLD CALC: 43.8 % — SIGNIFICANT CHANGE UP (ref 39–50)
HGB BLD-MCNC: 14.1 G/DL — SIGNIFICANT CHANGE UP (ref 13–17)
IANC: 6.72 K/UL — SIGNIFICANT CHANGE UP (ref 1.8–7.4)
IMM GRANULOCYTES NFR BLD AUTO: 0.5 % — SIGNIFICANT CHANGE UP (ref 0–0.9)
LYMPHOCYTES # BLD AUTO: 1.44 K/UL — SIGNIFICANT CHANGE UP (ref 1–3.3)
LYMPHOCYTES # BLD AUTO: 15.7 % — SIGNIFICANT CHANGE UP (ref 13–44)
MAGNESIUM SERPL-MCNC: 1.9 MG/DL — SIGNIFICANT CHANGE UP (ref 1.6–2.6)
MCHC RBC-ENTMCNC: 29.7 PG — SIGNIFICANT CHANGE UP (ref 27–34)
MCHC RBC-ENTMCNC: 32.2 GM/DL — SIGNIFICANT CHANGE UP (ref 32–36)
MCV RBC AUTO: 92.2 FL — SIGNIFICANT CHANGE UP (ref 80–100)
MONOCYTES # BLD AUTO: 0.77 K/UL — SIGNIFICANT CHANGE UP (ref 0–0.9)
MONOCYTES NFR BLD AUTO: 8.4 % — SIGNIFICANT CHANGE UP (ref 2–14)
NEUTROPHILS # BLD AUTO: 6.72 K/UL — SIGNIFICANT CHANGE UP (ref 1.8–7.4)
NEUTROPHILS NFR BLD AUTO: 73.5 % — SIGNIFICANT CHANGE UP (ref 43–77)
NRBC # BLD: 0 /100 WBCS — SIGNIFICANT CHANGE UP (ref 0–0)
NRBC # FLD: 0 K/UL — SIGNIFICANT CHANGE UP (ref 0–0)
PHOSPHATE SERPL-MCNC: 2.6 MG/DL — SIGNIFICANT CHANGE UP (ref 2.5–4.5)
PLATELET # BLD AUTO: 227 K/UL — SIGNIFICANT CHANGE UP (ref 150–400)
POTASSIUM SERPL-MCNC: 4.2 MMOL/L — SIGNIFICANT CHANGE UP (ref 3.5–5.3)
POTASSIUM SERPL-SCNC: 4.2 MMOL/L — SIGNIFICANT CHANGE UP (ref 3.5–5.3)
RBC # BLD: 4.75 M/UL — SIGNIFICANT CHANGE UP (ref 4.2–5.8)
RBC # FLD: 15.9 % — HIGH (ref 10.3–14.5)
SODIUM SERPL-SCNC: 143 MMOL/L — SIGNIFICANT CHANGE UP (ref 135–145)
WBC # BLD: 9.16 K/UL — SIGNIFICANT CHANGE UP (ref 3.8–10.5)
WBC # FLD AUTO: 9.16 K/UL — SIGNIFICANT CHANGE UP (ref 3.8–10.5)

## 2024-09-29 PROCEDURE — 70450 CT HEAD/BRAIN W/O DYE: CPT | Mod: 26

## 2024-09-29 PROCEDURE — 71250 CT THORAX DX C-: CPT | Mod: 26

## 2024-09-29 RX ADMIN — Medication 2 TABLET(S): at 21:58

## 2024-09-29 RX ADMIN — Medication 75 MILLIGRAM(S): at 13:46

## 2024-09-29 RX ADMIN — Medication 30 MILLIGRAM(S): at 18:57

## 2024-09-29 RX ADMIN — Medication 30 MILLIGRAM(S): at 13:46

## 2024-09-29 RX ADMIN — Medication 0.4 MILLIGRAM(S): at 21:58

## 2024-09-29 RX ADMIN — ATORVASTATIN CALCIUM 10 MILLIGRAM(S): 10 TABLET, FILM COATED ORAL at 21:58

## 2024-09-29 RX ADMIN — Medication 3 MILLIGRAM(S): at 21:58

## 2024-09-29 RX ADMIN — Medication 30 MILLIGRAM(S): at 06:24

## 2024-09-29 RX ADMIN — Medication 30 MILLIGRAM(S): at 23:38

## 2024-09-29 RX ADMIN — Medication 30 MILLIGRAM(S): at 00:05

## 2024-09-29 RX ADMIN — PREDNISONE 10 MILLIGRAM(S): 5 TABLET ORAL at 06:24

## 2024-09-29 RX ADMIN — Medication 100 MILLIGRAM(S): at 06:23

## 2024-09-29 RX ADMIN — FINASTERIDE 5 MILLIGRAM(S): 5 TABLET, FILM COATED ORAL at 13:46

## 2024-09-29 RX ADMIN — ENOXAPARIN SODIUM 40 MILLIGRAM(S): 150 INJECTION SUBCUTANEOUS at 13:47

## 2024-09-29 NOTE — PROGRESS NOTE ADULT - ASSESSMENT
89M with PMH cognitive dysfunction (AAOx2 at baseline), advanced COPD on daily prednisone with chronic respiratory failure on 4L NC, CAD/MI s/p stent, HLD, and BPH BIBEMS from [facility] for confusion in setting of suspected pneumonia. Pt does not know why he was brought to the hospital, unable to provide much history. Collateral obtained from daughter/HCP Meghan Gonzalez. Pt noted to have intermittent episodes of confusion by NH staff over the past 2 days, occasionally become agitated, saying nonsensical things, all unlike him. Pt is AAOx2 at baseline, would not know the year, but otherwise conversational. Whenever he was visited by daughter or son though, he was found to be at his baseline. He had a UA done that was unremarkable and a CXR that showed "pulmonary vascular congestion with patchy bibasilar infriltrates"- copy in pt's chart. And he was started on doxycycline yesterday, unclear how many doses he took. Pt with a chronic cough, did not notice anything like increased cough or sputum production from baseline. No witnessed episodes of shortness of breath, no increase in baseline oxygen requirement. No known fevers. Meghan believes patient has a history of heart failure and takes lasix regularly, however, heart failure not listed as a diagnosis from NH or [facility] outpatient pulm notes, lasix also not listed on medication list from NH or surescripts.   Pt himself currently feels well. Denies having any trouble breathing, chest pain, abdominal pain, or any other complaints.     Acute metabolic encephalopathy.   -check repeat CT head -NOT DONE YET;  WAS CANCELLED   - could be related to infection?  pneumonia,  uti etc:  being rule dout  - his VBG is fine with no retention of co2:  so thats not the reason of him getting more confused;     9/29;  -he is still confused?  baseline not sure;   -he is not wheezing;  awaiting ct chest   -heis still on 5 L:  try to bring down ; sao2 gaol is 88%; ex smoker     Mild leukocytosis to 12k on admission  - infectious vs reactive. afebrile, not septic, hemodynamically stable.   -Without any new fevers,   -URI sxs, worsening of chronic cough, or increased O2 requirements  - Procal negative.  - cw abx as per ID  ; to cont for now;   -needs ct chest     9/29; -on no antibiotics   -afebrile today  ;  wbc is normal today     Advanced COPD.   -ow suspicion for COPD exacerbation, lung exam with localized rhonchi/coarse crackles over R>L base. No increased baseline O2 requirement or worsening of chronic cough  - continue home prednisone 10mg daily   - duonebs PRN.  -and his VBg also did not show any  significant retention ofpco2:     9/29:  -cont current rx:  he seems stable:     Chronic hypoxic respiratory failure.   ·  Plan: - continue home NC at 4L NC, goal 88-92&-likely due to copd     CAD (coronary artery disease).    - continue home statin, plavix.  -NEW aflutter   -echo showed;  1. Left ventricular systolic function is normal with an ejection fraction of 69 % by Alex's method of disks.   2. Enlarged right ventricular cavity size and reduced right ventricular systolic function.   3. Left atrium is normal in size.   4. The aortic valve appears trileaflet with reduced systolic excursion. There is moderate aortic stenosis. The peak transaortic velocity is 2.37 m/s, peak transaortic gradient is 22.5 mmHg and mean transaortic gradient is 11.0 mmHg with an LVOT/aortic valve VTI ratio of 0.39. The aortic valve area is estimated at 1.21 cm² by the continuity equation.   5. Estimated pulmonary artery systolic pressure is 57 mmHg, consistent with moderate pulmonary hypertension.   6. No pericardial effusion seen.      dw acp and pts daughter

## 2024-09-29 NOTE — PROGRESS NOTE ADULT - ASSESSMENT
89M with PMH cognitive dysfunction (AAOx2 at baseline), advanced COPD on daily prednisone with chronic respiratory failure on 4L NC, CAD/MI s/p stent, HLD, and BPH BIBEMS from [facility] for confusion x 2 days. Chest imaging suggestive of pulmonary edema vs pneumonia.         Problem/Plan - 1:  ·  Problem: Acute metabolic encephalopathy.   ·  Plan: check repeat CT head   - without any focal deficits to suggest structural etiology- treatment for infection vs edema as below   - frequent reorientation  - maintain normal sleep/wake cycles (avoid waking patient between 11PM and 6AM unless medically necessary).    Problem/Plan - 2:  ·  Problem: Leukocytosis.   ·  Plan: mild leukocytosis to 12k on admission, infectious vs reactive. afebrile, not septic, hemodynamically stable. Without any new fevers, URI sxs, worsening of chronic cough, or increased O2 requirements. Procal negative. Favoring pulmonary edema over pneumonia at this time at this time   - ID fu - cw abx as per ID     Problem/Plan - 3:·  Problem: Advanced COPD.   ·  Plan: low suspicion for COPD exacerbation, lung exam with localized rhonchi/coarse crackles over R>L base. No increased baseline O2 requirement or worsening of chronic cough  - continue home prednisone 10mg daily   - duonebs PRN.    Problem/Plan - 4:  ·  Problem: lung nodule   ·  Plan: - CT reviewed  - will call oncology in am     Problem/Plan - 5:  ·  Problem: CAD (coronary artery disease).   ·  Plan: - continue home statin, plavix.    Problem/Plan - 6:  ·  Problem: BPH (benign prostatic hyperplasia).   ·  Plan: - continue home finasteride and tamsulosin.    Problem/Plan - 7:  ·  Problem: nEW aflutter   ·  Plan: cw tele  - sp BB  - cards fu

## 2024-09-29 NOTE — PROGRESS NOTE ADULT - SUBJECTIVE AND OBJECTIVE BOX
[facility] DIVISION of INFECTIOUS DISEASE  Jakub Peguero MD PhD, Zunilda Woodall MD, Alda Yang MD, Evan Talbert MD, Jesse Brewer MD  and providing coverage with Gianna Lorenz MD  Providing Infectious Disease Consultations at [facility], [facility], [facility], [facility]'s    Office# [phone] to schedule follow up appointments  Answering Service for urgent calls or New Consults [phone]  Cell# to text for urgent issues Jakub Peguero [phone]     infectious diseases progress note:    MICHELLE CHEUNG is a 89y y. o. Male patient    Overnight and events of the last 24hrs reviewed    Allergies    No Known Allergies    Intolerances        ANTIBIOTICS/RELEVANT:  antimicrobials    immunologic:    OTHER:  acetaminophen     Tablet .. 650 milliGRAM(s) Oral every 6 hours PRN  albuterol/ipratropium for Nebulization 3 milliLiter(s) Nebulizer every 6 hours PRN  albuterol/ipratropium for Nebulization. 3 milliLiter(s) Nebulizer once  aluminum hydroxide/magnesium hydroxide/simethicone Suspension 30 milliLiter(s) Oral every 4 hours PRN  atorvastatin 10 milliGRAM(s) Oral at bedtime  clopidogrel Tablet 75 milliGRAM(s) Oral daily  diltiazem    Tablet 30 milliGRAM(s) Oral every 6 hours  enoxaparin Injectable 40 milliGRAM(s) SubCutaneous every 24 hours  finasteride 5 milliGRAM(s) Oral daily  furosemide    Tablet 40 milliGRAM(s) Oral once  melatonin 3 milliGRAM(s) Oral at bedtime PRN  ondansetron Injectable 4 milliGRAM(s) IV Push every 8 hours PRN  predniSONE   Tablet 10 milliGRAM(s) Oral daily  senna 2 Tablet(s) Oral at bedtime  tamsulosin 0.4 milliGRAM(s) Oral at bedtime      Objective:  Vital Signs Last 24 Hrs  T(C): 36.7 (29 Sep 2024 11:26), Max: 36.7 (29 Sep 2024 11:26)  T(F): 98 (29 Sep 2024 11:26), Max: 98 (29 Sep 2024 11:26)  HR: 70 (29 Sep 2024 13:45) (70 - 82)  BP: 117/60 (29 Sep 2024 13:45) (117/60 - 157/79)  BP(mean): --  RR: 18 (29 Sep 2024 11:26) (18 - 18)  SpO2: 95% (29 Sep 2024 11:26) (90% - 95%)    Parameters below as of 29 Sep 2024 11:26  Patient On (Oxygen Delivery Method): nasal cannula  O2 Flow (L/min): 5      T(C): 36.7 (09-29-24 @ 11:26), Max: 36.7 (09-29-24 @ 11:26)  T(C): 36.7 (09-29-24 @ 11:26), Max: 36.7 (09-29-24 @ 11:26)  T(C): 36.7 (09-29-24 @ 11:26), Max: 37.1 (09-25-24 @ 20:00)    PHYSICAL EXAM:  HEENT: NC atraumatic  Neck: supple  Respiratory: no accessory muscle use, breathing comfortably  Cardiovascular: distant  Gastrointestinal: normal appearing, nondistended  Extremities: no clubbing, no cyanosis,        LABS:                          14.1   9.16  )-----------( 227      ( 29 Sep 2024 06:31 )             43.8       WBC  9.16 09-29 @ 06:31  10.83 09-28 @ 07:48  8.05 09-27 @ 07:20  10.01 09-26 @ 07:00  12.21 09-25 @ 14:44      09-29    143  |  106  |  12  ----------------------------<  131[H]  4.2   |  24  |  0.78    Ca    8.9      29 Sep 2024 06:31  Phos  2.6     09-29  Mg     1.90     09-29        Creatinine: 0.78 mg/dL (09-29-24 @ 06:31)  Creatinine: 0.88 mg/dL (09-28-24 @ 07:48)  Creatinine: 0.76 mg/dL (09-27-24 @ 07:20)  Creatinine: 0.74 mg/dL (09-26-24 @ 07:00)  Creatinine: 0.84 mg/dL (09-25-24 @ 14:44)        Urinalysis Basic - ( 29 Sep 2024 06:31 )    Color: x / Appearance: x / SG: x / pH: x  Gluc: 131 mg/dL / Ketone: x  / Bili: x / Urobili: x   Blood: x / Protein: x / Nitrite: x   Leuk Esterase: x / RBC: x / WBC x   Sq Epi: x / Non Sq Epi: x / Bacteria: x            INFLAMMATORY MARKERS      MICROBIOLOGY:      Culture - Blood (09.25.24 @ 20:25)    Specimen Source: .Blood BLOOD   Culture Results:   No growth at 72 Hours        RADIOLOGY & ADDITIONAL STUDIES:  < from: CT Chest No Cont (09.29.24 @ 13:10) >    ACC: [id number] EXAM:  CT CHEST   ORDERED BY: RADHA CARTAGENA     PROCEDURE DATE:  09/29/2024          INTERPRETATION:  CLINICAL INFORMATION: Altered mental status.    COMPARISON: Chest radiograph 9/25/2024    CONTRAST/COMPLICATIONS:  IV Contrast: NONE  Oral Contrast: NONE  Complications: None reported at time of study completion    PROCEDURE:  CT scan of the chest was obtained without intravenous contrast.    FINDINGS:    AIRWAYS/LUNGS/PLEURA: Patent central airways. Extensive emphysematous   changes. Bilateral peripheral reticular opacities, traction   bronchiectasis, and subpleural cystic changes with basilar predominance.   A 1.0 cm right upper lobe pulmonary nodule (2-72), a 1.4 cm right middle   lobe pulmonary nodule (2-111), and a 1.8 cm right lower lobe pulmonary   nodule (2-121). Small left pleural effusion and left basilar pleural   thickening.    MEDIASTINUM AND AJ: No lymphadenopathy.    VESSELS: Aortic calcifications. Coronary artery calcifications.    HEART: Heart size is normal. No pericardial effusion.    VISUALIZED UPPER ABDOMEN: A 6.0 cm hepatic dome cyst. Cholecystectomy.    CHEST WALL AND BONES: Degenerative changes of the thoracic spine. A 9 mm   right thyroid lobe hypoattenuating nodule (2-39). 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    IMPRESSION:    Extensive emphysematous changes.    Bibasilar predominant peripheral reticular opacities, traction   bronchiectasis, and subpleural cystic changes, compatible with   interstitial lung disease.    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  Consider PET scan or 3 month follow up.    Small left pleural effusion with basilar pleural thickening.    Partially imaged left parotid gland mass. Consider further evaluation   with contrast-enhanced MR neck from more definitive characterization.

## 2024-09-29 NOTE — PROGRESS NOTE ADULT - SUBJECTIVE AND OBJECTIVE BOX
Date of service 9/29/24    No pain or SOB, pleasantly confused on 1:1, unable to obtain ROS    MEDS  acetaminophen     Tablet .. 650 milliGRAM(s) Oral every 6 hours PRN  albuterol/ipratropium for Nebulization 3 milliLiter(s) Nebulizer every 6 hours PRN  albuterol/ipratropium for Nebulization. 3 milliLiter(s) Nebulizer once  aluminum hydroxide/magnesium hydroxide/simethicone Suspension 30 milliLiter(s) Oral every 4 hours PRN  atorvastatin 10 milliGRAM(s) Oral at bedtime  clopidogrel Tablet 75 milliGRAM(s) Oral daily  diltiazem    Tablet 30 milliGRAM(s) Oral every 6 hours  enoxaparin Injectable 40 milliGRAM(s) SubCutaneous every 24 hours  finasteride 5 milliGRAM(s) Oral daily  furosemide    Tablet 40 milliGRAM(s) Oral once  melatonin 3 milliGRAM(s) Oral at bedtime PRN  ondansetron Injectable 4 milliGRAM(s) IV Push every 8 hours PRN  predniSONE   Tablet 10 milliGRAM(s) Oral daily  senna 2 Tablet(s) Oral at bedtime  tamsulosin 0.4 milliGRAM(s) Oral at bedtime                            14.1   9.16  )-----------( 227      ( 29 Sep 2024 06:31 )             43.8       143  |  106  |  12  ----------------------------<  131[H]  4.2   |  24  |  0.78    Ca    8.9      29 Sep 2024 06:31  Phos  2.6     09-29  Mg     1.90     09-29        T(C): 36.7 (09-29-24 @ 11:26), Max: 36.7 (09-29-24 @ 11:26)  HR: 79 (09-29-24 @ 06:22) (78 - 89)  BP: 126/77 (09-29-24 @ 11:26) (126/77 - 157/79)  RR: 18 (09-29-24 @ 11:26) (18 - 18)  SpO2: 88% (09-29-24 @ 11:26) (88% - 95%)  Wt(kg): --    I&O's Summary      General: Well nourished in no acute distress.  Head: Normocephalic and atraumatic.   Neck: No JVD. No bruits. Supple. Does not appear to be enlarged.   Cardiovascular: + S1,S2 ; RRR Soft systolic murmur at the left lower sternal border. No rubs noted.    Lungs: CTA b/l. No rhonchi, rales or wheezes.   Abdomen: + BS, soft. Non tender. Non distended. No rebound. No guarding.   Extremities: No clubbing/cyanosis/edema.   Neurologic: Moves all four extremities. Full range of motion.   Skin: Warm and moist. The patient's skin has normal elasticity and good skin turgor.   Psychiatric: cannot eval  Musculoskeletal: Normal range of motion, normal strength    DATA    tele- NSR 80s      ECG:  	AF 140s    < from: TTE W or WO Ultrasound Enhancing Agent (09.26.24 @ 15:20) >     CONCLUSIONS:      1. Left ventricular systolic function is normal with an ejection fraction of 69 % by Alex's method ofdisks.   2. Enlarged right ventricular cavity size and reduced right ventricular systolic function.   3. Left atrium is normal in size.   4. The aortic valve appears trileaflet with reduced systolic excursion. There is moderate aortic stenosis. The peak transaortic velocity is 2.37 m/s, peak transaortic gradient is 22.5 mmHg and mean transaortic gradient is 11.0 mmHg with an LVOT/aortic valve VTI ratio of 0.39. The aortic valve area is estimated at 1.21 cm² by the continuity equation.   5. Estimated pulmonary artery systolic pressure is 57 mmHg, consistent with moderate pulmonary hypertension.   6. No pericardial effusion seen.  < end of copied text >      ASSESSMENT/PLAN: 	  89M with PMH cognitive dysfunction (AAOx2 at baseline), advanced COPD on daily prednisone with chronic respiratory failure on 4L NC, CAD/MI s/p stent 15 years ago, HLD, and BPH BIBEMS from [facility] for confusion in setting of suspected pneumonia. Cardiology consulted for New onset Rapid Afib /?flutter today    --TTE noted with Normal LVEF, mod AS, mod Pulm HTN  --on Cardizem 30 Q 6 and converted to SR  --Despite viviu0ebth of atleast 3, given AMS/agitation/?lung mass, hold off on full Ac for now  --family requests repeat chest imaging/ CT chest pending/ CT head pending  --Blood cultures negative to date    Jessica VAZQUEZ  [phone]

## 2024-09-29 NOTE — PROGRESS NOTE ADULT - SUBJECTIVE AND OBJECTIVE BOX
Patient is a 89y old  Male who presents with a chief complaint of acute metabolic encephalopathy (29 Sep 2024 12:38)    Date of servie : 09-29-24 @ 15:44  INTERVAL HPI/OVERNIGHT EVENTS:  T(C): 36.7 (09-29-24 @ 11:26), Max: 36.7 (09-29-24 @ 11:26)  HR: 70 (09-29-24 @ 13:45) (70 - 82)  BP: 117/60 (09-29-24 @ 13:45) (117/60 - 157/79)  RR: 18 (09-29-24 @ 11:26) (18 - 18)  SpO2: 95% (09-29-24 @ 11:26) (90% - 95%)  Wt(kg): --  I&O's Summary      LABS:                        14.1   9.16  )-----------( 227      ( 29 Sep 2024 06:31 )             43.8     09-29    143  |  106  |  12  ----------------------------<  131[H]  4.2   |  24  |  0.78    Ca    8.9      29 Sep 2024 06:31  Phos  2.6     09-29  Mg     1.90     09-29        Urinalysis Basic - ( 29 Sep 2024 06:31 )    Color: x / Appearance: x / SG: x / pH: x  Gluc: 131 mg/dL / Ketone: x  / Bili: x / Urobili: x   Blood: x / Protein: x / Nitrite: x   Leuk Esterase: x / RBC: x / WBC x   Sq Epi: x / Non Sq Epi: x / Bacteria: x      CAPILLARY BLOOD GLUCOSE            Urinalysis Basic - ( 29 Sep 2024 06:31 )    Color: x / Appearance: x / SG: x / pH: x  Gluc: 131 mg/dL / Ketone: x  / Bili: x / Urobili: x   Blood: x / Protein: x / Nitrite: x   Leuk Esterase: x / RBC: x / WBC x   Sq Epi: x / Non Sq Epi: x / Bacteria: x        MEDICATIONS  (STANDING):  albuterol/ipratropium for Nebulization. 3 milliLiter(s) Nebulizer once  atorvastatin 10 milliGRAM(s) Oral at bedtime  clopidogrel Tablet 75 milliGRAM(s) Oral daily  diltiazem    Tablet 30 milliGRAM(s) Oral every 6 hours  enoxaparin Injectable 40 milliGRAM(s) SubCutaneous every 24 hours  finasteride 5 milliGRAM(s) Oral daily  furosemide    Tablet 40 milliGRAM(s) Oral once  predniSONE   Tablet 10 milliGRAM(s) Oral daily  senna 2 Tablet(s) Oral at bedtime  tamsulosin 0.4 milliGRAM(s) Oral at bedtime    MEDICATIONS  (PRN):  acetaminophen     Tablet .. 650 milliGRAM(s) Oral every 6 hours PRN Temp greater or equal to 38C (100.4F), Mild Pain (1 - 3)  albuterol/ipratropium for Nebulization 3 milliLiter(s) Nebulizer every 6 hours PRN Shortness of Breath and/or Wheezing  aluminum hydroxide/magnesium hydroxide/simethicone Suspension 30 milliLiter(s) Oral every 4 hours PRN Dyspepsia  melatonin 3 milliGRAM(s) Oral at bedtime PRN Insomnia  ondansetron Injectable 4 milliGRAM(s) IV Push every 8 hours PRN Nausea and/or Vomiting          PHYSICAL EXAM:  GENERAL: confused   CHEST/LUNG: Clear to percussion bilaterally; No rales, rhonchi, wheezing, or rubs  HEART: Regular rate and rhythm; No murmurs, rubs, or gallops  ABDOMEN: Soft, Nontender, Nondistended; Bowel sounds present  EXTREMITIES:  2+ Peripheral Pulses, No clubbing, cyanosis, or edema  LYMPH: No lymphadenopathy noted  SKIN: No rashes or lesions    Care Discussed with Consultants/Other Providers [ ] YES  [ ] NO

## 2024-09-29 NOTE — PROGRESS NOTE ADULT - SUBJECTIVE AND OBJECTIVE BOX
EP Attending  HISTORY OF PRESENT ILLNESS: HPI:  89M with PMH cognitive dysfunction (AAOx2 at baseline), advanced COPD on daily prednisone with chronic respiratory failure on 4L NC, CAD/MI s/p stent, HLD, and BPH BIBEMS from [facility] for confusion in setting of suspected pneumonia. Pt does not know why he was brought to the hospital, unable to provide much history. Collateral obtained from daughter/HCP Meghan Gonzalez. Pt noted to have intermittent episodes of confusion by NH staff over the past 2 days, occasionally become agitated, saying nonsensical things, all unlike him. Pt is AAOx2 at baseline, would not know the year, but otherwise conversational. Whenever he was visited by daughter or son though, he was found to be at his baseline. He had a UA done that was unremarkable and a CXR that showed "pulmonary vascular congestion with patchy bibasilar infriltrates"- copy in pt's chart. And he was started on doxycycline yesterday, unclear how many doses he took. Pt with a chronic cough, did not notice anything like increased cough or sputum production from baseline. No witnessed episodes of shortness of breath, no increase in baseline oxygen requirement. No known fevers. Meghan believes patient has a history of heart failure and takes lasix regularly, however, heart failure not listed as a diagnosis from NH or [facility] outpatient pulm notes, lasix also not listed on medication list from NH or surescripts.   Pt himself currently feels well. Denies having any trouble breathing, chest pain, abdominal pain, or any other complaints.     In ED:  On arrivalL afebrile, hemodynamically stable, saturating 88% on 4L NC, briefly required 6L but now weaned back down to 4%, saturating >90%.   Labs notable for leukocytosis to 12k, elevated proBNP 3351, VBG with pH 7.37/50, lactate 2.6  UA negative for infection  CXR with reticular opacities over bibasilar lungs on prelim read    Given CTX and azithromycin.   Admitted to medicine for further evaluation.  (25 Sep 2024 22:23)    Date of service 9/29- resting in bed, confused, remains on 1:1, no overnight issues.    PAST MEDICAL & SURGICAL HISTORY:  Advanced COPD  Chronic respiratory failure with hypoxia  CAD (coronary artery disease)  History of myocardial infarction  Chronic cough  HLD (hyperlipidemia)  BPH (benign prostatic hyperplasia)  No significant past surgical history    acetaminophen     Tablet .. 650 milliGRAM(s) Oral every 6 hours PRN  albuterol/ipratropium for Nebulization 3 milliLiter(s) Nebulizer every 6 hours PRN  albuterol/ipratropium for Nebulization. 3 milliLiter(s) Nebulizer once  aluminum hydroxide/magnesium hydroxide/simethicone Suspension 30 milliLiter(s) Oral every 4 hours PRN  atorvastatin 10 milliGRAM(s) Oral at bedtime  clopidogrel Tablet 75 milliGRAM(s) Oral daily  diltiazem    Tablet 30 milliGRAM(s) Oral every 6 hours  enoxaparin Injectable 40 milliGRAM(s) SubCutaneous every 24 hours  finasteride 5 milliGRAM(s) Oral daily  furosemide    Tablet 40 milliGRAM(s) Oral once  melatonin 3 milliGRAM(s) Oral at bedtime PRN  ondansetron Injectable 4 milliGRAM(s) IV Push every 8 hours PRN  predniSONE   Tablet 10 milliGRAM(s) Oral daily  senna 2 Tablet(s) Oral at bedtime  tamsulosin 0.4 milliGRAM(s) Oral at bedtime                            14.1   9.16  )-----------( 227      ( 29 Sep 2024 06:31 )             43.8       09-29    143  |  106  |  12  ----------------------------<  131[H]  4.2   |  24  |  0.78    Ca    8.9      29 Sep 2024 06:31  Phos  2.6     09-29  Mg     1.90     09-29      T(C): 36.7 (09-29-24 @ 11:26), Max: 36.7 (09-29-24 @ 11:26)  HR: 79 (09-29-24 @ 06:22) (78 - 82)  BP: 126/77 (09-29-24 @ 11:26) (126/77 - 157/79)  RR: 18 (09-29-24 @ 11:26) (18 - 18)  SpO2: 88% (09-29-24 @ 11:26) (88% - 95%)  Wt(kg): --    I&O's Summary      Appearance: elderly man, sleepy and confused.	  HEENT:   Normal oral mucosa, PERRL, EOMI	  Lymphatic: No lymphadenopathy , no edema  Cardiovascular: Normal S1 S2, No JVD, No murmurs , Peripheral pulses palpable 2+ bilaterally  Respiratory: Lungs clear to auscultation, normal effort 	  Gastrointestinal:  Soft, Non-tender, + BS	  Skin: No rashes, No ecchymoses, No cyanosis, warm to touch  Musculoskeletal: Normal range of motion, normal strength  Psychiatry:  Mood & affect appropriate      TELEMETRY: AFib/AFlutter	    Echo: < from: TTE W or WO Ultrasound Enhancing Agent (09.26.24 @ 15:20) >     1. Left ventricular systolic function is normal with an ejection fraction of 69 % by Alex's method ofdisks.   2. Enlarged right ventricular cavity size and reduced right ventricular systolic function.   3. Left atrium is normal in size.   4. The aortic valve appears trileaflet with reduced systolic excursion. There is moderate aortic stenosis. The peak transaortic velocity is 2.37 m/s, peak transaortic gradient is 22.5 mmHg and mean transaortic gradient is 11.0 mmHg with an LVOT/aortic valve VTI ratio of 0.39. The aortic valve area is estimated at 1.21 cm² by the continuity equation.   5. Estimated pulmonary artery systolic pressure is 57 mmHg, consistent with moderate pulmonary hypertension.   6. No pericardial effusion seen.    < end of copied text >  	    ASSESSMENT/PLAN: Mr Barajas is a 89y Male here from nursing home w/ shortness of breath.  Has COPD, may have superimposed pneumonia.  New dx of atrial arrhythmia, may be causing acute diastolic heart failure.  Continue AV aniket blockade w/ diltiazem.  Room to increase, with holding parameters for SBP<100bpm.  Backup plan is a Digoxin load.  Awaiting remainder of workup and goals of care before starting oral anticoagulation for stroke prevention.  Will follow with you.    Tarik Marin M.D.  Cardiac Electrophysiology    office [phone]  pager [phone]

## 2024-09-29 NOTE — PROGRESS NOTE ADULT - ASSESSMENT
89 y/o M PMhx COPD on daily prednisone with chronic respiratory failure on 4L NC, CAD s/p stent, BPH, AAOx2 at baseline who presented from nursing home for confusion    PNA  chronic hypoxic resp failure- on baseline 4L NC  leukocytosis- resolved  AMS- resolved, appears back to baseline A&Ox 2  afebrile  procal negative  SARS-CoV-2/ flu/ RSV PCR negative  CXR- Reticular opacities in the bibasilar lungs likely reflecting interstitial lung disease.  he denies URI or resp symptoms though he is a poor historian    lower suspicion for PNA at this time   Recommendations  CT chest-no evidence of airspace disease  Culture - Blood (09.25.24 @ 20:25)   No growth at 72 Hours  sp ceftriaxone 1g daily x 5 days and azithromycin 500mg-abx stopped  obs off abx    Thank you for consulting us and involving us in the management of this most interesting and challenging case.  We will follow along in the care of this patient. Please call us at [phone] or text me directly on my cell# at [phone] with any concerns.    Starting tomorrow Dr Jesse Brewer will be covering this patient so please contact him with further questions office [phone] or our answering service at [phone]       89 y/o M PMhx COPD on daily prednisone with chronic respiratory failure on 4L NC, CAD s/p stent, BPH, AAOx2 at baseline who presented from nursing home for confusion    chronic hypoxic resp failure- on baseline 4L NC  leukocytosis- resolved  AMS- resolved, appears back to baseline   afebrile  procal negative  SARS-CoV-2/ flu/ RSV PCR negative  CXR- Reticular opacities in the bibasilar lungs likely reflecting interstitial lung disease.  he denies URI or resp symptoms though he is a poor historian     Recommendations  CT chest-no evidence of airspace disease  Culture - Blood (09.25.24 @ 20:25)   No growth at 72 Hours  sp ceftriaxone 1g daily x 5 days and azithromycin 500mg-abx stopped  obs off abx    Thank you for consulting us and involving us in the management of this most interesting and challenging case.  We will follow along in the care of this patient. Please call us at [phone] or text me directly on my cell# at [phone] with any concerns.    Starting tomorrow Dr Jesse Brewer will be covering this patient so please contact him with further questions office [phone] or our answering service at [phone]

## 2024-09-29 NOTE — PROGRESS NOTE ADULT - SUBJECTIVE AND OBJECTIVE BOX
Date of Service: 09-29-24 @ 11:10    Patient is a 89y old  Male who presents with a chief complaint of acute metabolic encephalopathy (28 Sep 2024 16:57)      Any change in ROS: he remains comfused;  on 5 L of oxygen      MEDICATIONS  (STANDING):  albuterol/ipratropium for Nebulization. 3 milliLiter(s) Nebulizer once  atorvastatin 10 milliGRAM(s) Oral at bedtime  clopidogrel Tablet 75 milliGRAM(s) Oral daily  diltiazem    Tablet 30 milliGRAM(s) Oral every 6 hours  enoxaparin Injectable 40 milliGRAM(s) SubCutaneous every 24 hours  finasteride 5 milliGRAM(s) Oral daily  furosemide    Tablet 40 milliGRAM(s) Oral once  predniSONE   Tablet 10 milliGRAM(s) Oral daily  senna 2 Tablet(s) Oral at bedtime  tamsulosin 0.4 milliGRAM(s) Oral at bedtime    MEDICATIONS  (PRN):  acetaminophen     Tablet .. 650 milliGRAM(s) Oral every 6 hours PRN Temp greater or equal to 38C (100.4F), Mild Pain (1 - 3)  albuterol/ipratropium for Nebulization 3 milliLiter(s) Nebulizer every 6 hours PRN Shortness of Breath and/or Wheezing  aluminum hydroxide/magnesium hydroxide/simethicone Suspension 30 milliLiter(s) Oral every 4 hours PRN Dyspepsia  melatonin 3 milliGRAM(s) Oral at bedtime PRN Insomnia  ondansetron Injectable 4 milliGRAM(s) IV Push every 8 hours PRN Nausea and/or Vomiting    Vital Signs Last 24 Hrs  T(C): 36.6 (29 Sep 2024 06:22), Max: 36.6 (28 Sep 2024 11:57)  T(F): 97.9 (29 Sep 2024 06:22), Max: 97.9 (28 Sep 2024 18:01)  HR: 79 (29 Sep 2024 06:22) (78 - 89)  BP: 139/65 (29 Sep 2024 06:22) (137/80 - 157/79)  BP(mean): --  RR: 18 (29 Sep 2024 06:22) (18 - 18)  SpO2: 95% (29 Sep 2024 06:22) (90% - 95%)    Parameters below as of 29 Sep 2024 06:22  Patient On (Oxygen Delivery Method): nasal cannula  O2 Flow (L/min): 5      I&O's Summary        Physical Exam:   GENERAL: NAD, well-groomed, well-developed  HEENT: SUGEY/   Atraumatic, Normocephalic  ENMT: No tonsillar erythema, exudates, or enlargement; Moist mucous membranes, Good dentition, No lesions  NECK: Supple, No JVD, Normal thyroid  CHEST/LUNG: Clear to auscultaion,  CVS: Regular rate and rhythm; No murmurs, rubs, or gallops  GI: : Soft, Nontender, Nondistended; Bowel sounds present  NERVOUS SYSTEM:  Alert & Oriented X1  EXTREMITIES:  - edema  LYMPH: No lymphadenopathy noted  SKIN: No rashes or lesions  ENDOCRINOLOGY: No Thyromegaly  PSYCH: confused    Labs:  28, 29                            14.1   9.16  )-----------( 227      ( 29 Sep 2024 06:31 )             43.8                         14.0   10.83 )-----------( 241      ( 28 Sep 2024 07:48 )             43.1                         12.9   8.05  )-----------( 195      ( 27 Sep 2024 07:20 )             39.8                         13.1   10.01 )-----------( 215      ( 26 Sep 2024 07:00 )             40.4                         14.8   12.21 )-----------( 262      ( 25 Sep 2024 14:44 )             44.7     09-29    143  |  106  |  12  ----------------------------<  131[H]  4.2   |  24  |  0.78  09-28    142  |  105  |  16  ----------------------------<  130[H]  4.2   |  24  |  0.88  09-27    139  |  104  |  14  ----------------------------<  100[H]  4.3   |  21[L]  |  0.76  09-26    140  |  104  |  14  ----------------------------<  95  4.4   |  24  |  0.74  09-25    140  |  102  |  15  ----------------------------<  125[H]  5.0   |  26  |  0.84    Ca    8.9      29 Sep 2024 06:31  Ca    9.0      28 Sep 2024 07:48  Phos  2.6     09-29  Phos  2.8     09-28  Mg     1.90     09-29  Mg     2.10     09-28    TPro  7.0  /  Alb  3.7  /  TBili  0.6  /  DBili  x   /  AST  14  /  ALT  13  /  AlkPhos  85  09-25    CAPILLARY BLOOD GLUCOSE              Urinalysis Basic - ( 29 Sep 2024 06:31 )    Color: x / Appearance: x / SG: x / pH: x  Gluc: 131 mg/dL / Ketone: x  / Bili: x / Urobili: x   Blood: x / Protein: x / Nitrite: x   Leuk Esterase: x / RBC: x / WBC x   Sq Epi: x / Non Sq Epi: x / Bacteria: x      Procalcitonin: 0.04 ng/mL (09-25 @ 20:25)        RECENT CULTURES:  09-25 @ 20:25 .Blood BLOOD         rad< from: Xray Chest 1 View AP/PA (09.25.24 @ 17:52) >  COMPARISON: None.    TECHNIQUE: Frontal view of the chest.    FINDINGS:    Reticular opacities in the bibasilar lungs. No pleural effusion. No   pneumothorax.  The heart size is normal.  No acute osseous abnormalities.    IMPRESSION:  No acute pulmonary pathology.  Reticular opacities in the bibasilar lungs likely reflecting interstitial   lung disease.      --- End of Report ---          RONAK SANDS MD; Resident Radiologist  This document has been electronically signed.  TAYE BORGES MD; Attending Radiologist  This document has been electronically signed. Sep 26 2024  7:06AM    < end of copied text >         No growth at 72 Hours    09-25 @ 20:10 .Blood BLOOD                No growth at 72 Hours          RESPIRATORY CULTURES:          Studies  Chest X-RAY  CT SCAN Chest   Venous Dopplers: LE:   CT Abdomen  Others

## 2024-09-30 LAB
ANION GAP SERPL CALC-SCNC: 14 MMOL/L — SIGNIFICANT CHANGE UP (ref 7–14)
BASOPHILS # BLD AUTO: 0.06 K/UL — SIGNIFICANT CHANGE UP (ref 0–0.2)
BASOPHILS NFR BLD AUTO: 0.8 % — SIGNIFICANT CHANGE UP (ref 0–2)
BUN SERPL-MCNC: 15 MG/DL — SIGNIFICANT CHANGE UP (ref 7–23)
CALCIUM SERPL-MCNC: 8.9 MG/DL — SIGNIFICANT CHANGE UP (ref 8.4–10.5)
CHLORIDE SERPL-SCNC: 106 MMOL/L — SIGNIFICANT CHANGE UP (ref 98–107)
CO2 SERPL-SCNC: 25 MMOL/L — SIGNIFICANT CHANGE UP (ref 22–31)
CREAT SERPL-MCNC: 0.86 MG/DL — SIGNIFICANT CHANGE UP (ref 0.5–1.3)
EGFR: 80 ML/MIN/1.73M2 — SIGNIFICANT CHANGE UP
EOSINOPHIL # BLD AUTO: 0.25 K/UL — SIGNIFICANT CHANGE UP (ref 0–0.5)
EOSINOPHIL NFR BLD AUTO: 3.5 % — SIGNIFICANT CHANGE UP (ref 0–6)
GLUCOSE SERPL-MCNC: 93 MG/DL — SIGNIFICANT CHANGE UP (ref 70–99)
HCT VFR BLD CALC: 43.2 % — SIGNIFICANT CHANGE UP (ref 39–50)
HGB BLD-MCNC: 13.6 G/DL — SIGNIFICANT CHANGE UP (ref 13–17)
IANC: 4.52 K/UL — SIGNIFICANT CHANGE UP (ref 1.8–7.4)
IMM GRANULOCYTES NFR BLD AUTO: 0.4 % — SIGNIFICANT CHANGE UP (ref 0–0.9)
LYMPHOCYTES # BLD AUTO: 1.64 K/UL — SIGNIFICANT CHANGE UP (ref 1–3.3)
LYMPHOCYTES # BLD AUTO: 22.9 % — SIGNIFICANT CHANGE UP (ref 13–44)
MAGNESIUM SERPL-MCNC: 2.1 MG/DL — SIGNIFICANT CHANGE UP (ref 1.6–2.6)
MCHC RBC-ENTMCNC: 30 PG — SIGNIFICANT CHANGE UP (ref 27–34)
MCHC RBC-ENTMCNC: 31.5 GM/DL — LOW (ref 32–36)
MCV RBC AUTO: 95.4 FL — SIGNIFICANT CHANGE UP (ref 80–100)
MONOCYTES # BLD AUTO: 0.65 K/UL — SIGNIFICANT CHANGE UP (ref 0–0.9)
MONOCYTES NFR BLD AUTO: 9.1 % — SIGNIFICANT CHANGE UP (ref 2–14)
NEUTROPHILS # BLD AUTO: 4.52 K/UL — SIGNIFICANT CHANGE UP (ref 1.8–7.4)
NEUTROPHILS NFR BLD AUTO: 63.3 % — SIGNIFICANT CHANGE UP (ref 43–77)
NRBC # BLD: 0 /100 WBCS — SIGNIFICANT CHANGE UP (ref 0–0)
NRBC # FLD: 0 K/UL — SIGNIFICANT CHANGE UP (ref 0–0)
PHOSPHATE SERPL-MCNC: 3.7 MG/DL — SIGNIFICANT CHANGE UP (ref 2.5–4.5)
PLATELET # BLD AUTO: 202 K/UL — SIGNIFICANT CHANGE UP (ref 150–400)
POTASSIUM SERPL-MCNC: 4 MMOL/L — SIGNIFICANT CHANGE UP (ref 3.5–5.3)
POTASSIUM SERPL-SCNC: 4 MMOL/L — SIGNIFICANT CHANGE UP (ref 3.5–5.3)
RBC # BLD: 4.53 M/UL — SIGNIFICANT CHANGE UP (ref 4.2–5.8)
RBC # FLD: 15.9 % — HIGH (ref 10.3–14.5)
SODIUM SERPL-SCNC: 145 MMOL/L — SIGNIFICANT CHANGE UP (ref 135–145)
WBC # BLD: 7.15 K/UL — SIGNIFICANT CHANGE UP (ref 3.8–10.5)
WBC # FLD AUTO: 7.15 K/UL — SIGNIFICANT CHANGE UP (ref 3.8–10.5)

## 2024-09-30 RX ADMIN — Medication 30 MILLIGRAM(S): at 04:48

## 2024-09-30 RX ADMIN — Medication 75 MILLIGRAM(S): at 11:50

## 2024-09-30 RX ADMIN — Medication 2 TABLET(S): at 22:07

## 2024-09-30 RX ADMIN — PREDNISONE 10 MILLIGRAM(S): 5 TABLET ORAL at 04:48

## 2024-09-30 RX ADMIN — FINASTERIDE 5 MILLIGRAM(S): 5 TABLET, FILM COATED ORAL at 11:50

## 2024-09-30 RX ADMIN — Medication 30 MILLIGRAM(S): at 11:50

## 2024-09-30 RX ADMIN — ENOXAPARIN SODIUM 40 MILLIGRAM(S): 150 INJECTION SUBCUTANEOUS at 11:50

## 2024-09-30 RX ADMIN — ATORVASTATIN CALCIUM 10 MILLIGRAM(S): 10 TABLET, FILM COATED ORAL at 22:07

## 2024-09-30 RX ADMIN — Medication 30 MILLIGRAM(S): at 19:03

## 2024-09-30 RX ADMIN — Medication 0.4 MILLIGRAM(S): at 22:07

## 2024-09-30 NOTE — PROGRESS NOTE ADULT - ASSESSMENT
89M with PMH cognitive dysfunction (AAOx2 at baseline), advanced COPD on daily prednisone with chronic respiratory failure on 4L NC, CAD/MI s/p stent, HLD, and BPH BIBEMS from [facility] for confusion in setting of suspected pneumonia. Pt does not know why he was brought to the hospital, unable to provide much history. Collateral obtained from daughter/HCP Meghan Gonzalez. Pt noted to have intermittent episodes of confusion by NH staff over the past 2 days, occasionally become agitated, saying nonsensical things, all unlike him. Pt is AAOx2 at baseline, would not know the year, but otherwise conversational. Whenever he was visited by daughter or son though, he was found to be at his baseline. He had a UA done that was unremarkable and a CXR that showed "pulmonary vascular congestion with patchy bibasilar infriltrates"- copy in pt's chart. And he was started on doxycycline yesterday, unclear how many doses he took. Pt with a chronic cough, did not notice anything like increased cough or sputum production from baseline. No witnessed episodes of shortness of breath, no increase in baseline oxygen requirement. No known fevers. Meghan believes patient has a history of heart failure and takes lasix regularly, however, heart failure not listed as a diagnosis from NH or [facility] outpatient pulm notes, lasix also not listed on medication list from NH or surescripts.   Pt himself currently feels well. Denies having any trouble breathing, chest pain, abdominal pain, or any other complaints.     Acute metabolic encephalopathy.   -check repeat CT head -NOT DONE YET;  WAS CANCELLED   - could be related to infection?  pneumonia,  uti etc:  being rule dout  - his VBG is fine with no retention of co2:  so thats not the reason of him getting more confused;     9/29;  -he is still confused?  baseline not sure;   -he is not wheezing;  awaiting ct chest   -heis still on 5 L:  try to bring down ; sao2 gaol is 88%; ex smoker     9/30: he is still  pretty confused;    -on chr prednisone for copd;   he is not wheezing    Mild leukocytosis to 12k on admission  - infectious vs reactive. afebrile, not septic, hemodynamically stable.   -Without any new fevers,   -URI sxs, worsening of chronic cough, or increased O2 requirements  - Procal negative.  - cw abx as per ID  ; to cont for now;   -needs ct chest     9/29; -on no antibiotics   -afebrile today  ;  wbc is normal today     9/30: remains afebrile and wbc is normal     Advanced COPD.   -ow suspicion for COPD exacerbation, lung exam with localized rhonchi/coarse crackles over R>L base. No increased baseline O2 requirement or worsening of chronic cough  - continue home prednisone 10mg daily   - duonebs PRN.  -and his VBg also did not show any  significant retention ofpco2:     9/30:  -cont current rx:  he seems stable:   -ct chest showed; Extensive emphysematous changes. Bibasilar predominant peripheral reticular opacities, traction bronchiectasis, and subpleural cystic changes, compatible with   interstitial lung disease. Right lung pulmonary nodules, measuring up to 1.8 cm, concerning for neoplasia.  Consider PET scan or 3 month follow up. Small left pleural effusion with basilar pleural thickening. Partially imaged left parotid gland mass. Consider further evaluation with contrast-enhanced MR neck from more definitive characterization.    Chronic hypoxic respiratory failure.   ·  Plan: - continue home NC at 4L NC, goal 88-92&-likely due to copd     CAD (coronary artery disease).    - continue home statin, plavix.  -NEW aflutter   -echo showed;  1. Left ventricular systolic function is normal with an ejection fraction of 69 % by Alex's method of disks.   2. Enlarged right ventricular cavity size and reduced right ventricular systolic function.   3. Left atrium is normal in size.   4. The aortic valve appears trileaflet with reduced systolic excursion. There is moderate aortic stenosis. The peak transaortic velocity is 2.37 m/s, peak transaortic gradient is 22.5 mmHg and mean transaortic gradient is 11.0 mmHg with an LVOT/aortic valve VTI ratio of 0.39. The aortic valve area is estimated at 1.21 cm² by the continuity equation.   5. Estimated pulmonary artery systolic pressure is 57 mmHg, consistent with moderate pulmonary hypertension.    defer to cards    6. No pericardial effusion seen.      dw acp and pts daughter

## 2024-09-30 NOTE — PROGRESS NOTE ADULT - SUBJECTIVE AND OBJECTIVE BOX
Date of service 9/30/24    No pain or SOB, pleasantly confused, unable to obtain ROS    MEDS  acetaminophen     Tablet .. 650 milliGRAM(s) Oral every 6 hours PRN  albuterol/ipratropium for Nebulization 3 milliLiter(s) Nebulizer every 6 hours PRN  albuterol/ipratropium for Nebulization. 3 milliLiter(s) Nebulizer once  aluminum hydroxide/magnesium hydroxide/simethicone Suspension 30 milliLiter(s) Oral every 4 hours PRN  atorvastatin 10 milliGRAM(s) Oral at bedtime  clopidogrel Tablet 75 milliGRAM(s) Oral daily  diltiazem    Tablet 30 milliGRAM(s) Oral every 6 hours  enoxaparin Injectable 40 milliGRAM(s) SubCutaneous every 24 hours  finasteride 5 milliGRAM(s) Oral daily  furosemide    Tablet 40 milliGRAM(s) Oral once  melatonin 3 milliGRAM(s) Oral at bedtime PRN  ondansetron Injectable 4 milliGRAM(s) IV Push every 8 hours PRN  predniSONE   Tablet 10 milliGRAM(s) Oral daily  senna 2 Tablet(s) Oral at bedtime  tamsulosin 0.4 milliGRAM(s) Oral at bedtime                            13.6   7.15  )-----------( 202      ( 30 Sep 2024 06:20 )             43.2       145  |  106  |  15  ----------------------------<  93  4.0   |  25  |  0.86    Ca    8.9      30 Sep 2024 06:20  Phos  3.7     09-30  Mg     2.10     09-30      T(C): 36.6 (09-30-24 @ 04:45), Max: 36.9 (09-29-24 @ 19:58)  HR: 72 (09-30-24 @ 04:45) (70 - 85)  BP: 128/68 (09-30-24 @ 04:45) (115/65 - 128/68)  RR: 16 (09-30-24 @ 04:45) (16 - 18)  SpO2: 95% (09-30-24 @ 04:45) (95% - 95%)    General: Well nourished in no acute distress.  Head: Normocephalic and atraumatic.   Neck: No JVD. No bruits. Supple. Does not appear to be enlarged.   Cardiovascular: + S1,S2 ; RRR Soft systolic murmur at the left lower sternal border. No rubs noted.    Lungs: CTA b/l. No rhonchi, rales or wheezes.   Abdomen: + BS, soft. Non tender. Non distended. No rebound. No guarding.   Extremities: No clubbing/cyanosis/edema.   Neurologic: Moves all four extremities. Full range of motion.   Skin: Warm and moist. The patient's skin has normal elasticity and good skin turgor.   Psychiatric: cannot eval  Musculoskeletal: Normal range of motion, normal strength    DATA    tele- NSR 50s      ECG:  	AF 140s    < from: TTE W or WO Ultrasound Enhancing Agent (09.26.24 @ 15:20) >     CONCLUSIONS:      1. Left ventricular systolic function is normal with an ejection fraction of 69 % by Alex's method ofdisks.   2. Enlarged right ventricular cavity size and reduced right ventricular systolic function.   3. Left atrium is normal in size.   4. The aortic valve appears trileaflet with reduced systolic excursion. There is moderate aortic stenosis. The peak transaortic velocity is 2.37 m/s, peak transaortic gradient is 22.5 mmHg and mean transaortic gradient is 11.0 mmHg with an LVOT/aortic valve VTI ratio of 0.39. The aortic valve area is estimated at 1.21 cm² by the continuity equation.   5. Estimated pulmonary artery systolic pressure is 57 mmHg, consistent with moderate pulmonary hypertension.   6. No pericardial effusion seen.  < end of copied text >      ASSESSMENT/PLAN: 	  89M with PMH cognitive dysfunction (AAOx2 at baseline), advanced COPD on daily prednisone with chronic respiratory failure on 4L NC, CAD/MI s/p stent 15 years ago, HLD, and BPH BIBEMS from [facility] for confusion in setting of suspected pneumonia. Cardiology consulted for New onset Rapid Afib /?flutter today    --TTE noted with Normal LVEF, mod AS, mod Pulm HTN  --on Cardizem 30 Q 6 and converted to SR, given sinus bradycardia can decrease to cardizem CD 120mg daily tomorrow  --Despite [id number] of atleast 3, given AMS/agitation/?lung mass, hold off on full Ac for now  --family requests repeat chest imaging/ CT chest pending  --Blood cultures negative to date    Jessica VAZQUEZ  [phone]

## 2024-09-30 NOTE — PROGRESS NOTE ADULT - ASSESSMENT
89M with PMH cognitive dysfunction (AAOx2 at baseline), advanced COPD on daily prednisone with chronic respiratory failure on 4L NC, CAD/MI s/p stent, HLD, and BPH BIBEMS from [facility] for confusion x 2 days. Chest imaging suggestive of pulmonary edema vs pneumonia.         Problem/Plan - 1:  ·  Problem: Acute metabolic encephalopathy.   ·  Plan:   - without any focal deficits to suggest structural etiology- treatment for infection vs edema as below   - frequent reorientation  - maintain normal sleep/wake cycles (avoid waking patient between 11PM and 6AM unless medically necessary).    Problem/Plan - 2:·  Problem: Leukocytosis.   ·  Plan: mild leukocytosis to 12k on admission, infectious vs reactive. afebrile, not septic, hemodynamically stable. Without any new fevers, URI sxs, worsening of chronic cough, or increased O2 requirements. Procal negative. Favoring pulmonary edema over pneumonia at this time at this time   - ID fu     Problem/Plan - 3:·  Problem: Advanced COPD.   ·  Plan: low suspicion for COPD exacerbation, lung exam with localized rhonchi/coarse crackles over R>L base. No increased baseline O2 requirement or worsening of chronic cough  - continue home prednisone 10mg daily   - duonebs PRN.    Problem/Plan - 4:  ·  Problem: lung nodule   ·  Plan: - CT reviewed  - Family does not want baird or treatment   - family wants comfort measures only     Problem/Plan - 5:  ·  Problem: CAD (coronary artery disease).   ·  Plan: - continue home statin, plavix.    Problem/Plan - 6:  ·  Problem: BPH (benign prostatic hyperplasia).   ·  Plan: - continue home finasteride and tamsulosin.    Problem/Plan - 7:  ·  Problem: nEW aflutter   ·  Plan: cw tele  - sp BB  - cards fu

## 2024-09-30 NOTE — PROGRESS NOTE ADULT - SUBJECTIVE AND OBJECTIVE BOX
[facility], Division of Infectious Diseases  DAV Rudd Y. Patel, S. Shah, G. Osman  [phone]  ([phone] - weekdays after 5pm and weekends)    Name: MICHELLE CHEUNG  Age/Gender: 89y Male  MRN: [id number]    Interval History:  Notes reviewed.   No concerning overnight events.  Afebrile.   denies SOB    Allergies: No Known Allergies      Objective:  Vitals:   T(F): 97.8 (09-30-24 @ 04:45), Max: 98.5 (09-29-24 @ 19:58)  HR: 72 (09-30-24 @ 04:45) (70 - 85)  BP: 128/68 (09-30-24 @ 04:45) (115/65 - 128/68)  RR: 16 (09-30-24 @ 04:45) (16 - 18)  SpO2: 95% (09-30-24 @ 04:45) (95% - 95%)  Physical Examination:  General: no acute distress  HEENT: anicteric, NC  Cardio: S1, S2, normal rate  Resp: clear to auscultation anteriorly  Abd: soft, NT, ND  Ext: trace LE edema  Skin: warm, dry    Laboratory Studies:  CBC:                       13.6   7.15  )-----------( 202      ( 30 Sep 2024 06:20 )             43.2     WBC Trend:  7.15 09-30-24 @ 06:20  9.16 09-29-24 @ 06:31  10.83 09-28-24 @ 07:48  8.05 09-27-24 @ 07:20  10.01 09-26-24 @ 07:00  12.21 09-25-24 @ 14:44    CMP: 09-30    145  |  106  |  15  ----------------------------<  93  4.0   |  25  |  0.86    Ca    8.9      30 Sep 2024 06:20  Phos  3.7     09-30  Mg     2.10     09-30            Urinalysis Basic - ( 30 Sep 2024 06:20 )    Color: x / Appearance: x / SG: x / pH: x  Gluc: 93 mg/dL / Ketone: x  / Bili: x / Urobili: x   Blood: x / Protein: x / Nitrite: x   Leuk Esterase: x / RBC: x / WBC x   Sq Epi: x / Non Sq Epi: x / Bacteria: x      Microbiology: reviewed     Culture - Blood (collected 09-25-24 @ 20:25)  Source: .Blood BLOOD  Preliminary Report (09-30-24 @ 09:01):    No growth at 4 days    Culture - Blood (collected 09-25-24 @ 20:10)  Source: .Blood BLOOD  Preliminary Report (09-30-24 @ 09:01):    No growth at 4 days    Urinalysis with Rflx Culture (collected 09-25-24 @ 13:50)        Radiology: reviewed     Medications:  acetaminophen     Tablet .. 650 milliGRAM(s) Oral every 6 hours PRN  albuterol/ipratropium for Nebulization 3 milliLiter(s) Nebulizer every 6 hours PRN  albuterol/ipratropium for Nebulization. 3 milliLiter(s) Nebulizer once  aluminum hydroxide/magnesium hydroxide/simethicone Suspension 30 milliLiter(s) Oral every 4 hours PRN  atorvastatin 10 milliGRAM(s) Oral at bedtime  clopidogrel Tablet 75 milliGRAM(s) Oral daily  diltiazem    Tablet 30 milliGRAM(s) Oral every 6 hours  enoxaparin Injectable 40 milliGRAM(s) SubCutaneous every 24 hours  finasteride 5 milliGRAM(s) Oral daily  furosemide    Tablet 40 milliGRAM(s) Oral once  melatonin 3 milliGRAM(s) Oral at bedtime PRN  ondansetron Injectable 4 milliGRAM(s) IV Push every 8 hours PRN  predniSONE   Tablet 10 milliGRAM(s) Oral daily  senna 2 Tablet(s) Oral at bedtime  tamsulosin 0.4 milliGRAM(s) Oral at bedtime    Antimicrobials:

## 2024-09-30 NOTE — PROGRESS NOTE ADULT - SUBJECTIVE AND OBJECTIVE BOX
Date of Service: 09-30-24 @ 10:35    Patient is a 89y old  Male who presents with a chief complaint of acute metabolic encephalopathy (30 Sep 2024 09:35)      Any change in ROS: he is on 4 L of oxygen    he is very confused:  not in any resp distress     MEDICATIONS  (STANDING):  albuterol/ipratropium for Nebulization. 3 milliLiter(s) Nebulizer once  atorvastatin 10 milliGRAM(s) Oral at bedtime  clopidogrel Tablet 75 milliGRAM(s) Oral daily  diltiazem    Tablet 30 milliGRAM(s) Oral every 6 hours  enoxaparin Injectable 40 milliGRAM(s) SubCutaneous every 24 hours  finasteride 5 milliGRAM(s) Oral daily  furosemide    Tablet 40 milliGRAM(s) Oral once  predniSONE   Tablet 10 milliGRAM(s) Oral daily  senna 2 Tablet(s) Oral at bedtime  tamsulosin 0.4 milliGRAM(s) Oral at bedtime    MEDICATIONS  (PRN):  acetaminophen     Tablet .. 650 milliGRAM(s) Oral every 6 hours PRN Temp greater or equal to 38C (100.4F), Mild Pain (1 - 3)  albuterol/ipratropium for Nebulization 3 milliLiter(s) Nebulizer every 6 hours PRN Shortness of Breath and/or Wheezing  aluminum hydroxide/magnesium hydroxide/simethicone Suspension 30 milliLiter(s) Oral every 4 hours PRN Dyspepsia  melatonin 3 milliGRAM(s) Oral at bedtime PRN Insomnia  ondansetron Injectable 4 milliGRAM(s) IV Push every 8 hours PRN Nausea and/or Vomiting    Vital Signs Last 24 Hrs  T(C): 36.6 (30 Sep 2024 04:45), Max: 36.9 (29 Sep 2024 19:58)  T(F): 97.8 (30 Sep 2024 04:45), Max: 98.5 (29 Sep 2024 19:58)  HR: 72 (30 Sep 2024 04:45) (70 - 85)  BP: 128/68 (30 Sep 2024 04:45) (115/65 - 128/68)  BP(mean): --  RR: 16 (30 Sep 2024 04:45) (16 - 18)  SpO2: 95% (30 Sep 2024 04:45) (95% - 95%)    Parameters below as of 30 Sep 2024 04:45  Patient On (Oxygen Delivery Method): nasal cannula  O2 Flow (L/min): 4      I&O's Summary    29 Sep 2024 07:01  -  30 Sep 2024 07:00  --------------------------------------------------------  IN: 350 mL / OUT: 400 mL / NET: -50 mL          Physical Exam:   GENERAL: NAD, well-groomed, well-developed  HEENT: SUGEY/   Atraumatic, Normocephalic  ENMT: No tonsillar erythema, exudates, or enlargement; Moist mucous membranes, Good dentition, No lesions  NECK: Supple, No JVD, Normal thyroid  CHEST/LUNG: Clear to auscultaion-  CVS: Regular rate and rhythm; No murmurs, rubs, or gallops  GI: : Soft, Nontender, Nondistended; Bowel sounds present  NERVOUS SYSTEM:  Alert & Oriented X0  EXTREMITIES: - edema  LYMPH: No lymphadenopathy noted  SKIN: No rashes or lesions  ENDOCRINOLOGY: No Thyromegaly  PSYCH: confused     Labs:  28, 29                            13.6   7.15  )-----------( 202      ( 30 Sep 2024 06:20 )             43.2                         14.1   9.16  )-----------( 227      ( 29 Sep 2024 06:31 )             43.8                         14.0   10.83 )-----------( 241      ( 28 Sep 2024 07:48 )             43.1                         12.9   8.05  )-----------( 195      ( 27 Sep 2024 07:20 )             39.8     09-30    145  |  106  |  15  ----------------------------<  93  4.0   |  25  |  0.86  09-29    143  |  106  |  12  ----------------------------<  131[H]  4.2   |  24  |  0.78  09-28    142  |  105  |  16  ----------------------------<  130[H]  4.2   |  24  |  0.88  09-27    139  |  104  |  14  ----------------------------<  100[H]  4.3   |  21[L]  |  0.76    Ca    8.9      30 Sep 2024 06:20  Ca    8.9      29 Sep 2024 06:31  Phos  3.7     09-30  Phos  2.6     09-29  Mg     2.10     09-30  Mg     1.90     09-29      CAPILLARY BLOOD GLUCOSE              Urinalysis Basic - ( 30 Sep 2024 06:20 )    Color: x / Appearance: x / SG: x / pH: x  Gluc: 93 mg/dL / Ketone: x  / Bili: x / Urobili: x   Blood: x / Protein: x / Nitrite: x   Leuk Esterase: x / RBC: x / WBC x   Sq Epi: x / Non Sq Epi: x / Bacteria: x            RECENT CULTURES:  09-25 @ 20:25 .Blood BLOOD     rad< from: CT Head No Cont (09.29.24 @ 13:10) >  TYMPANOMASTOID CAVITIES:  Clear.  VISUALIZED ORBITS: Bilateral lens replacement.  CALVARIUM: Intact.    MISCELLANEOUS: Incidental and partially imaged 1.5 x 2.4 cm soft tissue   attenuating mass in the left parotid gland at superior retrocondylar   location, and more peripheral 1.1 cm nodule that may be another tumor or   lymph node.      IMPRESSION:    No acute intracranial findings.    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 ENT referral is advised, and   further imaging with neck CT or MRI can be done as outpatient and tissue   sampling under ultrasound guidance.    --- End of Report ---          EMMA GUERRERO MD; Resident Radiologist  This document has been electronically signed.  OPAL RAMOS MD; Attending Radiologist  This document has been electronically signed. Sep 29 2024  1:25PM    < end of copied text >  < from: CT Chest No Cont (09.29.24 @ 13:10) >    VESSELS: Aortic calcifications. Coronary artery calcifications.    HEART: Heart size is normal. No pericardial effusion.    VISUALIZED UPPER ABDOMEN: A 6.0 cm hepatic dome cyst. Cholecystectomy.    CHEST WALL AND BONES: Degenerative changes of the thoracic spine. A 9 mm   right thyroid lobe hypoattenuating nodule (2-39). 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    IMPRESSION:    Extensive emphysematous changes.    Bibasilar predominant peripheral reticular opacities, traction   bronchiectasis, and subpleural cystic changes, compatible with   interstitial lung disease.    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  Consider PET scan or 3 month follow up.    Small left pleural effusion with basilar pleural thickening.    Partially imaged left parotid gland mass. Consider further evaluation   with contrast-enhanced MR neck from more definitive characterization.    --- End of Report ---    < end of copied text >    rad         No growth at 4 days    09-25 @ 20:10 .Blood BLOOD                No growth at 4 days          RESPIRATORY CULTURES:          Studies  Chest X-RAY  CT SCAN Chest   Venous Dopplers: LE:   CT Abdomen  Others

## 2024-09-30 NOTE — PROGRESS NOTE ADULT - SUBJECTIVE AND OBJECTIVE BOX
Patient is a 89y old  Male who presents with a chief complaint of acute metabolic encephalopathy (30 Sep 2024 12:09)    Date of servie : 09-30-24 @ 12:55  INTERVAL HPI/OVERNIGHT EVENTS:  T(C): 36.6 (09-30-24 @ 04:45), Max: 36.9 (09-29-24 @ 19:58)  HR: 72 (09-30-24 @ 04:45) (70 - 85)  BP: 128/68 (09-30-24 @ 04:45) (115/65 - 128/68)  RR: 16 (09-30-24 @ 04:45) (16 - 18)  SpO2: 95% (09-30-24 @ 04:45) (95% - 95%)  Wt(kg): --  I&O's Summary    29 Sep 2024 07:01  -  30 Sep 2024 07:00  --------------------------------------------------------  IN: 350 mL / OUT: 400 mL / NET: -50 mL        LABS:                        13.6   7.15  )-----------( 202      ( 30 Sep 2024 06:20 )             43.2     09-30    145  |  106  |  15  ----------------------------<  93  4.0   |  25  |  0.86    Ca    8.9      30 Sep 2024 06:20  Phos  3.7     09-30  Mg     2.10     09-30        Urinalysis Basic - ( 30 Sep 2024 06:20 )    Color: x / Appearance: x / SG: x / pH: x  Gluc: 93 mg/dL / Ketone: x  / Bili: x / Urobili: x   Blood: x / Protein: x / Nitrite: x   Leuk Esterase: x / RBC: x / WBC x   Sq Epi: x / Non Sq Epi: x / Bacteria: x      CAPILLARY BLOOD GLUCOSE            Urinalysis Basic - ( 30 Sep 2024 06:20 )    Color: x / Appearance: x / SG: x / pH: x  Gluc: 93 mg/dL / Ketone: x  / Bili: x / Urobili: x   Blood: x / Protein: x / Nitrite: x   Leuk Esterase: x / RBC: x / WBC x   Sq Epi: x / Non Sq Epi: x / Bacteria: x        MEDICATIONS  (STANDING):  albuterol/ipratropium for Nebulization. 3 milliLiter(s) Nebulizer once  atorvastatin 10 milliGRAM(s) Oral at bedtime  clopidogrel Tablet 75 milliGRAM(s) Oral daily  diltiazem    Tablet 30 milliGRAM(s) Oral every 6 hours  enoxaparin Injectable 40 milliGRAM(s) SubCutaneous every 24 hours  finasteride 5 milliGRAM(s) Oral daily  furosemide    Tablet 40 milliGRAM(s) Oral once  predniSONE   Tablet 10 milliGRAM(s) Oral daily  senna 2 Tablet(s) Oral at bedtime  tamsulosin 0.4 milliGRAM(s) Oral at bedtime    MEDICATIONS  (PRN):  acetaminophen     Tablet .. 650 milliGRAM(s) Oral every 6 hours PRN Temp greater or equal to 38C (100.4F), Mild Pain (1 - 3)  albuterol/ipratropium for Nebulization 3 milliLiter(s) Nebulizer every 6 hours PRN Shortness of Breath and/or Wheezing  aluminum hydroxide/magnesium hydroxide/simethicone Suspension 30 milliLiter(s) Oral every 4 hours PRN Dyspepsia  melatonin 3 milliGRAM(s) Oral at bedtime PRN Insomnia  ondansetron Injectable 4 milliGRAM(s) IV Push every 8 hours PRN Nausea and/or Vomiting          PHYSICAL EXAM:  GENERAL: NAD, well-groomed, well-developed  HEAD:  Atraumatic, Normocephalic  CHEST/LUNG: Clear to percussion bilaterally; No rales, rhonchi, wheezing, or rubs  HEART: Regular rate and rhythm; No murmurs, rubs, or gallops  ABDOMEN: Soft, Nontender, Nondistended; Bowel sounds present  EXTREMITIES:  2+ Peripheral Pulses, No clubbing, cyanosis, or edema  LYMPH: No lymphadenopathy noted  SKIN: No rashes or lesions    Care Discussed with Consultants/Other Providers [ ] YES  [ ] NO

## 2024-09-30 NOTE — PROGRESS NOTE ADULT - ASSESSMENT
89 y/o M PMhx COPD on daily prednisone with chronic respiratory failure on 4L NC, CAD s/p stent, BPH, AAOx2 at baseline who presented from nursing home for confusion    PNA, ILD, chronic hypoxic resp failure, AMS  leukocytosis- resolved  SARS-CoV-2/ flu/ RSV PCR negative  CT chest- Extensive emphysematous changes. Bibasilar predominant peripheral reticular opacities, traction bronchiectasis, and subpleural cystic changes, compatible with   interstitial lung disease. Right lung pulmonary nodules, measuring up to 1.8 cm, concerning for neoplasia. Partially imaged left parotid gland mass.  s/p ceftriaxone x 5 days, complete 9/29  s/p azithromycin x 3 days    Recommendations  continue off antibiotics  work-up of lung nodules per pulm  consider CT neck for parotid mass    Jesse Brewer M.D.  [facility], Division of Infectious Diseases  [phone]  After 5pm on weekdays and all day on weekends - please call [phone]  89 y/o M PMhx COPD on daily prednisone with chronic respiratory failure on 4L NC, CAD s/p stent, BPH, AAOx2 at baseline who presented from nursing home for confusion    PNA, ILD, chronic hypoxic resp failure, AMS  leukocytosis- resolved  SARS-CoV-2/ flu/ RSV PCR negative  CT chest- Extensive emphysematous changes. Bibasilar predominant peripheral reticular opacities, traction bronchiectasis, and subpleural cystic changes, compatible with   interstitial lung disease. Right lung pulmonary nodules, measuring up to 1.8 cm, concerning for neoplasia. Partially imaged left parotid gland mass.  s/p ceftriaxone x 5 days, complete 9/29  s/p azithromycin x 3 days    Recommendations  continue off antibiotics  lung nodules and parotid mass noted on imaging  GOC discussion  f/u pulm recs    Jesse Brewer M.D.  [facility], Division of Infectious Diseases  [phone]  After 5pm on weekdays and all day on weekends - please call [phone]

## 2024-09-30 NOTE — PROGRESS NOTE ADULT - SUBJECTIVE AND OBJECTIVE BOX
EP Attending  HISTORY OF PRESENT ILLNESS: HPI:  89M with PMH cognitive dysfunction (AAOx2 at baseline), advanced COPD on daily prednisone with chronic respiratory failure on 4L NC, CAD/MI s/p stent, HLD, and BPH BIBEMS from [facility] for confusion in setting of suspected pneumonia. Pt does not know why he was brought to the hospital, unable to provide much history. Collateral obtained from daughter/HCP Meghan Gonzalez. Pt noted to have intermittent episodes of confusion by NH staff over the past 2 days, occasionally become agitated, saying nonsensical things, all unlike him. Pt is AAOx2 at baseline, would not know the year, but otherwise conversational. Whenever he was visited by daughter or son though, he was found to be at his baseline. He had a UA done that was unremarkable and a CXR that showed "pulmonary vascular congestion with patchy bibasilar infriltrates"- copy in pt's chart. And he was started on doxycycline yesterday, unclear how many doses he took. Pt with a chronic cough, did not notice anything like increased cough or sputum production from baseline. No witnessed episodes of shortness of breath, no increase in baseline oxygen requirement. No known fevers. Meghan believes patient has a history of heart failure and takes lasix regularly, however, heart failure not listed as a diagnosis from NH or [facility] outpatient pulm notes, lasix also not listed on medication list from NH or surescripts.   Pt himself currently feels well. Denies having any trouble breathing, chest pain, abdominal pain, or any other complaints.     In ED:  On arrivalL afebrile, hemodynamically stable, saturating 88% on 4L NC, briefly required 6L but now weaned back down to 4%, saturating >90%.   Labs notable for leukocytosis to 12k, elevated proBNP 3351, VBG with pH 7.37/50, lactate 2.6  UA negative for infection  CXR with reticular opacities over bibasilar lungs on prelim read    Given CTX and azithromycin.   Admitted to medicine for further evaluation.  (25 Sep 2024 22:23)    9/29- resting in bed, confused, remains on 1:1, no overnight issues.  Date of service 9/30- resting in bed, no new complaints.    PAST MEDICAL & SURGICAL HISTORY:  Advanced COPD  Chronic respiratory failure with hypoxia  CAD (coronary artery disease)  History of myocardial infarction  Chronic cough  HLD (hyperlipidemia)  acetaminophen     Tablet .. 650 milliGRAM(s) Oral every 6 hours PRN  albuterol/ipratropium for Nebulization 3 milliLiter(s) Nebulizer every 6 hours PRN  albuterol/ipratropium for Nebulization. 3 milliLiter(s) Nebulizer once  aluminum hydroxide/magnesium hydroxide/simethicone Suspension 30 milliLiter(s) Oral every 4 hours PRN  atorvastatin 10 milliGRAM(s) Oral at bedtime  clopidogrel Tablet 75 milliGRAM(s) Oral daily  diltiazem    Tablet 30 milliGRAM(s) Oral every 6 hours  enoxaparin Injectable 40 milliGRAM(s) SubCutaneous every 24 hours  finasteride 5 milliGRAM(s) Oral daily  furosemide    Tablet 40 milliGRAM(s) Oral once  melatonin 3 milliGRAM(s) Oral at bedtime PRN  ondansetron Injectable 4 milliGRAM(s) IV Push every 8 hours PRN  predniSONE   Tablet 10 milliGRAM(s) Oral daily  senna 2 Tablet(s) Oral at bedtime  tamsulosin 0.4 milliGRAM(s) Oral at bedtime                            13.6   7.15  )-----------( 202      ( 30 Sep 2024 06:20 )             43.2       09-30    145  |  106  |  15  ----------------------------<  93  4.0   |  25  |  0.86    Ca    8.9      30 Sep 2024 06:20  Phos  3.7     09-30  Mg     2.10     09-30              T(C): 36.6 (09-30-24 @ 04:45), Max: 36.9 (09-29-24 @ 19:58)  HR: 72 (09-30-24 @ 04:45) (70 - 85)  BP: 128/68 (09-30-24 @ 04:45) (115/65 - 128/68)  RR: 16 (09-30-24 @ 04:45) (16 - 18)  SpO2: 95% (09-30-24 @ 04:45) (95% - 95%)  Wt(kg): --    I&O's Summary    29 Sep 2024 07:01  -  30 Sep 2024 07:00  --------------------------------------------------------  IN: 350 mL / OUT: 400 mL / NET: -50 mL    BPH (benign prostatic hyperplasia)  No significant past surgical history          Appearance: elderly man, sleepy and confused.	  HEENT:   Normal oral mucosa, PERRL, EOMI	  Lymphatic: No lymphadenopathy , no edema  Cardiovascular: Normal S1 S2, No JVD, No murmurs , Peripheral pulses palpable 2+ bilaterally  Respiratory: Lungs clear to auscultation, normal effort 	  Gastrointestinal:  Soft, Non-tender, + BS	  Skin: No rashes, No ecchymoses, No cyanosis, warm to touch  Musculoskeletal: Normal range of motion, normal strength  Psychiatry:  Mood & affect appropriate      TELEMETRY: AFib/AFlutter	    Echo: < from: TTE W or WO Ultrasound Enhancing Agent (09.26.24 @ 15:20) >     1. Left ventricular systolic function is normal with an ejection fraction of 69 % by Alex's method ofdisks.   2. Enlarged right ventricular cavity size and reduced right ventricular systolic function.   3. Left atrium is normal in size.   4. The aortic valve appears trileaflet with reduced systolic excursion. There is moderate aortic stenosis. The peak transaortic velocity is 2.37 m/s, peak transaortic gradient is 22.5 mmHg and mean transaortic gradient is 11.0 mmHg with an LVOT/aortic valve VTI ratio of 0.39. The aortic valve area is estimated at 1.21 cm² by the continuity equation.   5. Estimated pulmonary artery systolic pressure is 57 mmHg, consistent with moderate pulmonary hypertension.   6. No pericardial effusion seen.    < end of copied text >  	    ASSESSMENT/PLAN: Mr Barajas is a 89y Male here from nursing home w/ shortness of breath.  Has COPD, may have superimposed pneumonia.  New dx of atrial arrhythmia, may be causing acute diastolic heart failure.  Continue AV aniket blockade w/ diltiazem.  Room to increase, with holding parameters for SBP<100bpm.  Backup plan is a Digoxin load.  Awaiting remainder of workup and goals of care before starting oral anticoagulation for stroke prevention.  Will follow with you.    Tarik Marin M.D.  Cardiac Electrophysiology    office [phone]  pager [phone]

## 2024-10-01 LAB
ANION GAP SERPL CALC-SCNC: 12 MMOL/L — SIGNIFICANT CHANGE UP (ref 7–14)
BUN SERPL-MCNC: 13 MG/DL — SIGNIFICANT CHANGE UP (ref 7–23)
CALCIUM SERPL-MCNC: 8.7 MG/DL — SIGNIFICANT CHANGE UP (ref 8.4–10.5)
CHLORIDE SERPL-SCNC: 108 MMOL/L — HIGH (ref 98–107)
CO2 SERPL-SCNC: 24 MMOL/L — SIGNIFICANT CHANGE UP (ref 22–31)
CREAT SERPL-MCNC: 0.73 MG/DL — SIGNIFICANT CHANGE UP (ref 0.5–1.3)
CULTURE RESULTS: SIGNIFICANT CHANGE UP
CULTURE RESULTS: SIGNIFICANT CHANGE UP
EGFR: 84 ML/MIN/1.73M2 — SIGNIFICANT CHANGE UP
GLUCOSE SERPL-MCNC: 90 MG/DL — SIGNIFICANT CHANGE UP (ref 70–99)
HCT VFR BLD CALC: 42.7 % — SIGNIFICANT CHANGE UP (ref 39–50)
HGB BLD-MCNC: 13.4 G/DL — SIGNIFICANT CHANGE UP (ref 13–17)
MAGNESIUM SERPL-MCNC: 1.9 MG/DL — SIGNIFICANT CHANGE UP (ref 1.6–2.6)
MCHC RBC-ENTMCNC: 29.9 PG — SIGNIFICANT CHANGE UP (ref 27–34)
MCHC RBC-ENTMCNC: 31.4 GM/DL — LOW (ref 32–36)
MCV RBC AUTO: 95.3 FL — SIGNIFICANT CHANGE UP (ref 80–100)
NRBC # BLD: 0 /100 WBCS — SIGNIFICANT CHANGE UP (ref 0–0)
NRBC # FLD: 0 K/UL — SIGNIFICANT CHANGE UP (ref 0–0)
PHOSPHATE SERPL-MCNC: 3.1 MG/DL — SIGNIFICANT CHANGE UP (ref 2.5–4.5)
PLATELET # BLD AUTO: 186 K/UL — SIGNIFICANT CHANGE UP (ref 150–400)
POTASSIUM SERPL-MCNC: 4 MMOL/L — SIGNIFICANT CHANGE UP (ref 3.5–5.3)
POTASSIUM SERPL-SCNC: 4 MMOL/L — SIGNIFICANT CHANGE UP (ref 3.5–5.3)
RBC # BLD: 4.48 M/UL — SIGNIFICANT CHANGE UP (ref 4.2–5.8)
RBC # FLD: 15.9 % — HIGH (ref 10.3–14.5)
SODIUM SERPL-SCNC: 144 MMOL/L — SIGNIFICANT CHANGE UP (ref 135–145)
SPECIMEN SOURCE: SIGNIFICANT CHANGE UP
SPECIMEN SOURCE: SIGNIFICANT CHANGE UP
WBC # BLD: 7.84 K/UL — SIGNIFICANT CHANGE UP (ref 3.8–10.5)
WBC # FLD AUTO: 7.84 K/UL — SIGNIFICANT CHANGE UP (ref 3.8–10.5)

## 2024-10-01 RX ORDER — DILTIAZEM HCL 300 MG
120 CAPSULE, EXTENDED RELEASE 24HR ORAL DAILY
Refills: 0 | Status: DISCONTINUED | OUTPATIENT
Start: 2024-10-02 | End: 2024-10-16

## 2024-10-01 RX ADMIN — ENOXAPARIN SODIUM 40 MILLIGRAM(S): 150 INJECTION SUBCUTANEOUS at 11:55

## 2024-10-01 RX ADMIN — Medication 75 MILLIGRAM(S): at 11:49

## 2024-10-01 RX ADMIN — Medication 30 MILLIGRAM(S): at 05:11

## 2024-10-01 RX ADMIN — Medication 2 TABLET(S): at 21:31

## 2024-10-01 RX ADMIN — Medication 30 MILLIGRAM(S): at 00:29

## 2024-10-01 RX ADMIN — Medication 30 MILLIGRAM(S): at 11:50

## 2024-10-01 RX ADMIN — PREDNISONE 10 MILLIGRAM(S): 5 TABLET ORAL at 05:10

## 2024-10-01 RX ADMIN — ATORVASTATIN CALCIUM 10 MILLIGRAM(S): 10 TABLET, FILM COATED ORAL at 21:31

## 2024-10-01 RX ADMIN — Medication 0.4 MILLIGRAM(S): at 21:31

## 2024-10-01 RX ADMIN — FINASTERIDE 5 MILLIGRAM(S): 5 TABLET, FILM COATED ORAL at 11:49

## 2024-10-01 NOTE — PROGRESS NOTE ADULT - SUBJECTIVE AND OBJECTIVE BOX
Patient is a 89y old  Male who presents with a chief complaint of acute metabolic encephalopathy (01 Oct 2024 15:42)    Date of servie : 10-01-24 @ 16:36  INTERVAL HPI/OVERNIGHT EVENTS:  T(C): 36.9 (10-01-24 @ 11:20), Max: 36.9 (10-01-24 @ 11:20)  HR: 66 (10-01-24 @ 11:20) (59 - 66)  BP: 109/60 (10-01-24 @ 11:20) (109/51 - 115/65)  RR: 19 (10-01-24 @ 11:20) (16 - 19)  SpO2: 98% (10-01-24 @ 11:20) (95% - 98%)  Wt(kg): --  I&O's Summary    30 Sep 2024 07:01  -  01 Oct 2024 07:00  --------------------------------------------------------  IN: 300 mL / OUT: 200 mL / NET: 100 mL        LABS:                        13.4   7.84  )-----------( 186      ( 01 Oct 2024 05:44 )             42.7     10-01    144  |  108[H]  |  13  ----------------------------<  90  4.0   |  24  |  0.73    Ca    8.7      01 Oct 2024 05:44  Phos  3.1     10-01  Mg     1.90     10-01        Urinalysis Basic - ( 01 Oct 2024 05:44 )    Color: x / Appearance: x / SG: x / pH: x  Gluc: 90 mg/dL / Ketone: x  / Bili: x / Urobili: x   Blood: x / Protein: x / Nitrite: x   Leuk Esterase: x / RBC: x / WBC x   Sq Epi: x / Non Sq Epi: x / Bacteria: x      CAPILLARY BLOOD GLUCOSE            Urinalysis Basic - ( 01 Oct 2024 05:44 )    Color: x / Appearance: x / SG: x / pH: x  Gluc: 90 mg/dL / Ketone: x  / Bili: x / Urobili: x   Blood: x / Protein: x / Nitrite: x   Leuk Esterase: x / RBC: x / WBC x   Sq Epi: x / Non Sq Epi: x / Bacteria: x        MEDICATIONS  (STANDING):  albuterol/ipratropium for Nebulization. 3 milliLiter(s) Nebulizer once  atorvastatin 10 milliGRAM(s) Oral at bedtime  clopidogrel Tablet 75 milliGRAM(s) Oral daily  enoxaparin Injectable 40 milliGRAM(s) SubCutaneous every 24 hours  finasteride 5 milliGRAM(s) Oral daily  furosemide    Tablet 40 milliGRAM(s) Oral once  predniSONE   Tablet 10 milliGRAM(s) Oral daily  senna 2 Tablet(s) Oral at bedtime  tamsulosin 0.4 milliGRAM(s) Oral at bedtime    MEDICATIONS  (PRN):  acetaminophen     Tablet .. 650 milliGRAM(s) Oral every 6 hours PRN Temp greater or equal to 38C (100.4F), Mild Pain (1 - 3)  albuterol/ipratropium for Nebulization 3 milliLiter(s) Nebulizer every 6 hours PRN Shortness of Breath and/or Wheezing  aluminum hydroxide/magnesium hydroxide/simethicone Suspension 30 milliLiter(s) Oral every 4 hours PRN Dyspepsia  melatonin 3 milliGRAM(s) Oral at bedtime PRN Insomnia  ondansetron Injectable 4 milliGRAM(s) IV Push every 8 hours PRN Nausea and/or Vomiting          PHYSICAL EXAM:  GENERAL: NAD, well-groomed, well-developed  HEAD:  Atraumatic, Normocephalic  CHEST/LUNG: Clear to percussion bilaterally; No rales, rhonchi, wheezing, or rubs  HEART: Regular rate and rhythm; No murmurs, rubs, or gallops  ABDOMEN: Soft, Nontender, Nondistended; Bowel sounds present  EXTREMITIES:  2+ Peripheral Pulses, No clubbing, cyanosis, or edema  LYMPH: No lymphadenopathy noted  SKIN: No rashes or lesions    Care Discussed with Consultants/Other Providers [ ] YES  [ ] NO

## 2024-10-01 NOTE — PROGRESS NOTE ADULT - ASSESSMENT
89M with PMH cognitive dysfunction (AAOx2 at baseline), advanced COPD on daily prednisone with chronic respiratory failure on 4L NC, CAD/MI s/p stent, HLD, and BPH BIBEMS from [facility] for confusion in setting of suspected pneumonia. Pt does not know why he was brought to the hospital, unable to provide much history. Collateral obtained from daughter/HCP Meghan Gonzalez. Pt noted to have intermittent episodes of confusion by NH staff over the past 2 days, occasionally become agitated, saying nonsensical things, all unlike him. Pt is AAOx2 at baseline, would not know the year, but otherwise conversational. Whenever he was visited by daughter or son though, he was found to be at his baseline. He had a UA done that was unremarkable and a CXR that showed "pulmonary vascular congestion with patchy bibasilar infriltrates"- copy in pt's chart. And he was started on doxycycline yesterday, unclear how many doses he took. Pt with a chronic cough, did not notice anything like increased cough or sputum production from baseline. No witnessed episodes of shortness of breath, no increase in baseline oxygen requirement. No known fevers. Meghan believes patient has a history of heart failure and takes lasix regularly, however, heart failure not listed as a diagnosis from NH or [facility] outpatient pulm notes, lasix also not listed on medication list from NH or surescripts.   Pt himself currently feels well. Denies having any trouble breathing, chest pain, abdominal pain, or any other complaints.     Acute metabolic encephalopathy.   -check repeat CT head -NOT DONE YET;  WAS CANCELLED   - could be related to infection?  pneumonia,  uti etc:  being rule dout  - his VBG is fine with no retention of co2:  so thats not the reason of him getting more confused;     9/29;  -he is still confused?  baseline not sure;   -he is not wheezing;  awaiting ct chest   -heis still on 5 L:  try to bring down ; sao2 gaol is 88%; ex smoker     9/30: he is still  pretty confused;    -on chr prednisone for copd;   he is not wheezing    10/1; seems to be doing  ok ;on 4 L of oxygen :    Mild leukocytosis to 12k on admission  - infectious vs reactive. afebrile, not septic, hemodynamically stable.   -Without any new fevers,   -URI sxs, worsening of chronic cough, or increased O2 requirements  - Procal negative.  - cw abx as per ID  ; to cont for now;   -needs ct chest     9/29; -on no antibiotics   -afebrile today  ;  wbc is normal today     9/30: remains afebrile and wbc is normal   10/1: seems OK:     Advanced COPD.   -ow suspicion for COPD exacerbation, lung exam with localized rhonchi/coarse crackles over R>L base. No increased baseline O2 requirement or worsening of chronic cough  - continue home prednisone 10mg daily   - duonebs PRN.  -and his VBg also did not show any  significant retention ofpco2:     9/30:  -cont current rx:  he seems stable:   -ct chest showed; Extensive emphysematous changes. Bibasilar predominant peripheral reticular opacities, traction bronchiectasis, and subpleural cystic changes, compatible with   interstitial lung disease. Right lung pulmonary nodules, measuring up to 1.8 cm, concerning for neoplasia.  Consider PET scan or 3 month follow up. Small left pleural effusion with basilar pleural thickening. Partially imaged left parotid gland mass. Consider further evaluation with contrast-enhanced MR neck from more definitive characterization.    10/1:  dw daughter : she will decide about the pulm nodule:  and has parotid gland tumor ; work up per primary team     Chronic hypoxic respiratory failure.   ·  Plan: - continue home NC at 4L NC, goal 88-92&-likely due to copd     CAD (coronary artery disease).    - continue home statin, plavix.  -NEW aflutter   -echo showed;  1. Left ventricular systolic function is normal with an ejection fraction of 69 % by Alex's method of disks.   2. Enlarged right ventricular cavity size and reduced right ventricular systolic function.   3. Left atrium is normal in size.   4. The aortic valve appears trileaflet with reduced systolic excursion. There is moderate aortic stenosis. The peak transaortic velocity is 2.37 m/s, peak transaortic gradient is 22.5 mmHg and mean transaortic gradient is 11.0 mmHg with an LVOT/aortic valve VTI ratio of 0.39. The aortic valve area is estimated at 1.21 cm² by the continuity equation.   5. Estimated pulmonary artery systolic pressure is 57 mmHg, consistent with moderate pulmonary hypertension.    defer to cards    6. No pericardial effusion seen.      dw acp and PMD

## 2024-10-01 NOTE — PROGRESS NOTE ADULT - SUBJECTIVE AND OBJECTIVE BOX
EP Attending  HISTORY OF PRESENT ILLNESS: HPI:  89M with PMH cognitive dysfunction (AAOx2 at baseline), advanced COPD on daily prednisone with chronic respiratory failure on 4L NC, CAD/MI s/p stent, HLD, and BPH BIBEMS from [facility] for confusion in setting of suspected pneumonia. Pt does not know why he was brought to the hospital, unable to provide much history. Collateral obtained from daughter/HCP Meghan Gonzalez. Pt noted to have intermittent episodes of confusion by NH staff over the past 2 days, occasionally become agitated, saying nonsensical things, all unlike him. Pt is AAOx2 at baseline, would not know the year, but otherwise conversational. Whenever he was visited by daughter or son though, he was found to be at his baseline. He had a UA done that was unremarkable and a CXR that showed "pulmonary vascular congestion with patchy bibasilar infriltrates"- copy in pt's chart. And he was started on doxycycline yesterday, unclear how many doses he took. Pt with a chronic cough, did not notice anything like increased cough or sputum production from baseline. No witnessed episodes of shortness of breath, no increase in baseline oxygen requirement. No known fevers. Meghan believes patient has a history of heart failure and takes lasix regularly, however, heart failure not listed as a diagnosis from NH or [facility] outpatient pulm notes, lasix also not listed on medication list from NH or surescripts.   Pt himself currently feels well. Denies having any trouble breathing, chest pain, abdominal pain, or any other complaints.     In ED:  On arrivalL afebrile, hemodynamically stable, saturating 88% on 4L NC, briefly required 6L but now weaned back down to 4%, saturating >90%.   Labs notable for leukocytosis to 12k, elevated proBNP 3351, VBG with pH 7.37/50, lactate 2.6  UA negative for infection  CXR with reticular opacities over bibasilar lungs on prelim read    Given CTX and azithromycin.   Admitted to medicine for further evaluation.  (25 Sep 2024 22:23)    9/29- resting in bed, confused, remains on 1:1, no overnight issues.  9/30- resting in bed, no new complaints.  Date of service 9/31- resting in bed.     acetaminophen     Tablet .. 650 milliGRAM(s) Oral every 6 hours PRN  albuterol/ipratropium for Nebulization 3 milliLiter(s) Nebulizer every 6 hours PRN  albuterol/ipratropium for Nebulization. 3 milliLiter(s) Nebulizer once  aluminum hydroxide/magnesium hydroxide/simethicone Suspension 30 milliLiter(s) Oral every 4 hours PRN  atorvastatin 10 milliGRAM(s) Oral at bedtime  clopidogrel Tablet 75 milliGRAM(s) Oral daily  enoxaparin Injectable 40 milliGRAM(s) SubCutaneous every 24 hours  finasteride 5 milliGRAM(s) Oral daily  furosemide    Tablet 40 milliGRAM(s) Oral once  melatonin 3 milliGRAM(s) Oral at bedtime PRN  ondansetron Injectable 4 milliGRAM(s) IV Push every 8 hours PRN  predniSONE   Tablet 10 milliGRAM(s) Oral daily  senna 2 Tablet(s) Oral at bedtime  tamsulosin 0.4 milliGRAM(s) Oral at bedtime                            13.4   7.84  )-----------( 186      ( 01 Oct 2024 05:44 )             42.7       10-01    144  |  108[H]  |  13  ----------------------------<  90  4.0   |  24  |  0.73    Ca    8.7      01 Oct 2024 05:44  Phos  3.1     10-01  Mg     1.90     10-01    T(C): 36.9 (10-01-24 @ 11:20), Max: 36.9 (10-01-24 @ 11:20)  HR: 66 (10-01-24 @ 11:20) (59 - 66)  BP: 109/60 (10-01-24 @ 11:20) (109/51 - 115/65)  RR: 19 (10-01-24 @ 11:20) (16 - 19)  SpO2: 98% (10-01-24 @ 11:20) (95% - 98%)  Wt(kg): --    I&O's Summary    30 Sep 2024 07:01  -  01 Oct 2024 07:00  --------------------------------------------------------  IN: 300 mL / OUT: 200 mL / NET: 100 mL    Appearance: elderly man, sleepy and confused.	  HEENT:   Normal oral mucosa, PERRL, EOMI	  Lymphatic: No lymphadenopathy , no edema  Cardiovascular: Normal S1 S2, No JVD, No murmurs , Peripheral pulses palpable 2+ bilaterally  Respiratory: Lungs clear to auscultation, normal effort 	  Gastrointestinal:  Soft, Non-tender, + BS	  Skin: No rashes, No ecchymoses, No cyanosis, warm to touch  Musculoskeletal: Normal range of motion, normal strength  Psychiatry:  Mood & affect appropriate    TELEMETRY: AFib/AFlutter	    Echo: < from: TTE W or WO Ultrasound Enhancing Agent (09.26.24 @ 15:20) >     1. Left ventricular systolic function is normal with an ejection fraction of 69 % by Alex's method ofdisks.   2. Enlarged right ventricular cavity size and reduced right ventricular systolic function.   3. Left atrium is normal in size.   4. The aortic valve appears trileaflet with reduced systolic excursion. There is moderate aortic stenosis. The peak transaortic velocity is 2.37 m/s, peak transaortic gradient is 22.5 mmHg and mean transaortic gradient is 11.0 mmHg with an LVOT/aortic valve VTI ratio of 0.39. The aortic valve area is estimated at 1.21 cm² by the continuity equation.   5. Estimated pulmonary artery systolic pressure is 57 mmHg, consistent with moderate pulmonary hypertension.   6. No pericardial effusion seen.    < end of copied text >  	  ASSESSMENT/PLAN: Mr Barajas is a 89y Male here from nursing home w/ shortness of breath.  Has COPD, may have superimposed pneumonia.  New dx of atrial arrhythmia, may be causing acute diastolic heart failure.  Continue AV aniket blockade w/ diltiazem.  Room to increase, with holding parameters for SBP<100bpm.  Backup plan is a Digoxin load.  Awaiting remainder of workup and goals of care before starting oral anticoagulation for stroke prevention.  Will follow with you.    Tarik Marin M.D.  Cardiac Electrophysiology    office [phone]  pager [phone]

## 2024-10-01 NOTE — PROGRESS NOTE ADULT - CONVERSATION DETAILS
pt understands that pt likely has a malignancy  they don't want biopsy  agreed for dc to LTC with eventual transition to hospice
Daughter does not want baird for lung nodule or parotid mass  family does not want any workup or treatment incase it is cancerous  family wants comfort care and hospice referral

## 2024-10-01 NOTE — PROGRESS NOTE ADULT - SUBJECTIVE AND OBJECTIVE BOX
Date of Service: 10-01-24 @ 10:33    Patient is a 89y old  Male who presents with a chief complaint of acute metabolic encephalopathy (01 Oct 2024 08:53)      Any change in ROS: seems same:  very confused; on 4 L of oxygen      MEDICATIONS  (STANDING):  albuterol/ipratropium for Nebulization. 3 milliLiter(s) Nebulizer once  atorvastatin 10 milliGRAM(s) Oral at bedtime  clopidogrel Tablet 75 milliGRAM(s) Oral daily  diltiazem    Tablet 30 milliGRAM(s) Oral every 6 hours  enoxaparin Injectable 40 milliGRAM(s) SubCutaneous every 24 hours  finasteride 5 milliGRAM(s) Oral daily  furosemide    Tablet 40 milliGRAM(s) Oral once  predniSONE   Tablet 10 milliGRAM(s) Oral daily  senna 2 Tablet(s) Oral at bedtime  tamsulosin 0.4 milliGRAM(s) Oral at bedtime    MEDICATIONS  (PRN):  acetaminophen     Tablet .. 650 milliGRAM(s) Oral every 6 hours PRN Temp greater or equal to 38C (100.4F), Mild Pain (1 - 3)  albuterol/ipratropium for Nebulization 3 milliLiter(s) Nebulizer every 6 hours PRN Shortness of Breath and/or Wheezing  aluminum hydroxide/magnesium hydroxide/simethicone Suspension 30 milliLiter(s) Oral every 4 hours PRN Dyspepsia  melatonin 3 milliGRAM(s) Oral at bedtime PRN Insomnia  ondansetron Injectable 4 milliGRAM(s) IV Push every 8 hours PRN Nausea and/or Vomiting    Vital Signs Last 24 Hrs  T(C): 36.6 (01 Oct 2024 05:10), Max: 36.8 (30 Sep 2024 12:00)  T(F): 97.8 (01 Oct 2024 05:10), Max: 98.3 (30 Sep 2024 12:00)  HR: 66 (01 Oct 2024 05:10) (59 - 66)  BP: 109/51 (01 Oct 2024 05:10) (106/67 - 115/65)  BP(mean): --  RR: 16 (01 Oct 2024 05:10) (16 - 18)  SpO2: 96% (01 Oct 2024 05:10) (95% - 98%)    Parameters below as of 01 Oct 2024 05:10    O2 Flow (L/min): 4      I&O's Summary    30 Sep 2024 07:01  -  01 Oct 2024 07:00  --------------------------------------------------------  IN: 300 mL / OUT: 200 mL / NET: 100 mL          Physical Exam:   GENERAL: NAD, well-groomed, well-developed  HEENT: SUGEY/   Atraumatic, Normocephalic  ENMT: No tonsillar erythema, exudates, or enlargement; Moist mucous membranes, Good dentition, No lesions  NECK: Supple, No JVD, Normal thyroid  CHEST/LUNG: Clear to auscultaion  CVS: Regular rate and rhythm; No murmurs, rubs, or gallops  GI: : Soft, Nontender, Nondistended; Bowel sounds present  NERVOUS SYSTEM:  Alert & Oriented X0  EXTREMITIES:  - edema  LYMPH: No lymphadenopathy noted  SKIN: No rashes or lesions  ENDOCRINOLOGY: No Thyromegaly  PSYCH: confused     Labs:  28, 29                            13.4   7.84  )-----------( 186      ( 01 Oct 2024 05:44 )             42.7                         13.6   7.15  )-----------( 202      ( 30 Sep 2024 06:20 )             43.2                         14.1   9.16  )-----------( 227      ( 29 Sep 2024 06:31 )             43.8                         14.0   10.83 )-----------( 241      ( 28 Sep 2024 07:48 )             43.1     10-01    144  |  108[H]  |  13  ----------------------------<  90  4.0   |  24  |  0.73  09-30    145  |  106  |  15  ----------------------------<  93  4.0   |  25  |  0.86  09-29    143  |  106  |  12  ----------------------------<  131[H]  4.2   |  24  |  0.78  09-28    142  |  105  |  16  ----------------------------<  130[H]  4.2   |  24  |  0.88    Ca    8.7      01 Oct 2024 05:44  Ca    8.9      30 Sep 2024 06:20  Phos  3.1     10-01  Phos  3.7     09-30  Mg     1.90     10-01  Mg     2.10     09-30      CAPILLARY BLOOD GLUCOSE      < from: CT Head No Cont (09.29.24 @ 13:10) >  IMPRESSION:    No acute intracranial findings.    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 ENT referral is advised, and   further imaging with neck CT or MRI can be done as outpatient and tissue   sampling under ultrasound guidance.    --- End of Report ---          EMMA GUERRERO MD; Resident Radiologist  This document has been electronically signed.  OPAL RAMOS MD; Attending Radiologist  This document has been electronically signed. Sep 29 2024  1:25PM    < end of copied text >  < from: CT Chest No Cont (09.29.24 @ 13:10) >  IMPRESSION:    Extensive emphysematous changes.    Bibasilar predominant peripheral reticular opacities, traction   bronchiectasis, and subpleural cystic changes, compatible with   interstitial lung disease.    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  Consider PET scan or 3 month follow up.    Small left pleural effusion with basilar pleural thickening.    Partially imaged left parotid gland mass. Consider further evaluation   with contrast-enhanced MR neck from more definitive characterization.    --- End of Report ---    < end of copied text >          Urinalysis Basic - ( 01 Oct 2024 05:44 )    Color: x / Appearance: x / SG: x / pH: x  Gluc: 90 mg/dL / Ketone: x  / Bili: x / Urobili: x   Blood: x / Protein: x / Nitrite: x   Leuk Esterase: x / RBC: x / WBC x   Sq Epi: x / Non Sq Epi: x / Bacteria: x            RECENT CULTURES:  09-25 @ 20:25 .Blood BLOOD       rad< from: CT Head No Cont (09.29.24 @ 13:10) >  ion, and more peripheral 1.1 cm nodule that may be another tumor or   lymph node.      IMPRESSION:    No acute intracranial findings.    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 ENT referral is advised, and   further imaging with neck CT or MRI can be done as outpatient and tissue   sampling under ultrasound guidance.    --- End of Report ---          EMMA GUERRERO MD; Resident Radiologist  This document has been electronically signed.  OPAL RAMOS MD; Attending Radiologist  This document has been electronically signed. Sep 29 2024  1:25PM    < end of copied text >           No growth at 5 days    09-25 @ 20:10 .Blood BLOOD                No growth at 5 days          RESPIRATORY CULTURES:          Studies  Chest X-RAY  CT SCAN Chest   Venous Dopplers: LE:   CT Abdomen  Others

## 2024-10-01 NOTE — PROGRESS NOTE ADULT - ASSESSMENT
89 y/o M PMhx COPD on daily prednisone with chronic respiratory failure on 4L NC, CAD s/p stent, BPH, AAOx2 at baseline who presented from nursing home for confusion    PNA, ILD, chronic hypoxic resp failure, AMS  leukocytosis- resolved  SARS-CoV-2/ flu/ RSV PCR negative  CT chest- Extensive emphysematous changes. Bibasilar predominant peripheral reticular opacities, traction bronchiectasis, and subpleural cystic changes, compatible with   interstitial lung disease. Right lung pulmonary nodules, measuring up to 1.8 cm, concerning for neoplasia. Partially imaged left parotid gland mass.  s/p ceftriaxone x 5 days, complete 9/29  s/p azithromycin x 3 days    Recommendations  continue off antibiotics  lung nodules and parotid mass noted on imaging  GOC discussion  f/u pulm recs    We will sign off. Thank you for allowing us to participate in the care of Mr. Barajas. Please feel free to call with any questions or concerns.     Jesse Brewer M.D.  [facility], Division of Infectious Diseases  [phone]  After 5pm on weekdays and all day on weekends - please call [phone]  89 y/o M PMhx COPD on daily prednisone with chronic respiratory failure on 4L NC, CAD s/p stent, BPH, AAOx2 at baseline who presented from nursing home for confusion    PNA, ILD, chronic hypoxic resp failure, AMS  leukocytosis- resolved  SARS-CoV-2/ flu/ RSV PCR negative  CT chest- Extensive emphysematous changes. Bibasilar predominant peripheral reticular opacities, traction bronchiectasis, and subpleural cystic changes, compatible with   interstitial lung disease. Right lung pulmonary nodules, measuring up to 1.8 cm, concerning for neoplasia. Partially imaged left parotid gland mass.  s/p ceftriaxone x 5 days, complete 9/29  s/p azithromycin x 3 days    Recommendations  continue off antibiotics  lung nodules and parotid mass noted on imaging  f/u pulm recs  GOC discussion    We will sign off. Thank you for allowing us to participate in the care of Mr. Barajas. Please feel free to call with any questions or concerns.     Jesse Brewer M.D.  [facility], Division of Infectious Diseases  [phone]  After 5pm on weekdays and all day on weekends - please call [phone]

## 2024-10-01 NOTE — PROGRESS NOTE ADULT - SUBJECTIVE AND OBJECTIVE BOX
Date of service 10/01/24    No pain or SOB, pleasantly confused, unable to obtain ROS    MEDS  acetaminophen     Tablet .. 650 milliGRAM(s) Oral every 6 hours PRN  albuterol/ipratropium for Nebulization 3 milliLiter(s) Nebulizer every 6 hours PRN  albuterol/ipratropium for Nebulization. 3 milliLiter(s) Nebulizer once  aluminum hydroxide/magnesium hydroxide/simethicone Suspension 30 milliLiter(s) Oral every 4 hours PRN  atorvastatin 10 milliGRAM(s) Oral at bedtime  clopidogrel Tablet 75 milliGRAM(s) Oral daily  diltiazem    Tablet 30 milliGRAM(s) Oral every 6 hours  enoxaparin Injectable 40 milliGRAM(s) SubCutaneous every 24 hours  finasteride 5 milliGRAM(s) Oral daily  furosemide    Tablet 40 milliGRAM(s) Oral once  melatonin 3 milliGRAM(s) Oral at bedtime PRN  ondansetron Injectable 4 milliGRAM(s) IV Push every 8 hours PRN  predniSONE   Tablet 10 milliGRAM(s) Oral daily  senna 2 Tablet(s) Oral at bedtime  tamsulosin 0.4 milliGRAM(s) Oral at bedtime                          13.4   7.84  )-----------( 186      ( 01 Oct 2024 05:44 )             42.7       144  |  108[H]  |  13  ----------------------------<  90  4.0   |  24  |  0.73    Ca    8.7      01 Oct 2024 05:44  Phos  3.1     10-01  Mg     1.90     10-01    T(C): 36.9 (10-01-24 @ 11:20), Max: 36.9 (10-01-24 @ 11:20)  HR: 66 (10-01-24 @ 11:20) (59 - 66)  BP: 109/60 (10-01-24 @ 11:20) (109/51 - 115/65)  RR: 19 (10-01-24 @ 11:20) (16 - 19)  SpO2: 98% (10-01-24 @ 11:20) (95% - 98%)  Wt(kg): --    I&O's Summary    30 Sep 2024 07:01  -  01 Oct 2024 07:00  --------------------------------------------------------  IN: 300 mL / OUT: 200 mL / NET: 100 mL    General: Well nourished in no acute distress.  Head: Normocephalic and atraumatic.   Neck: No JVD. No bruits. Supple. Does not appear to be enlarged.   Cardiovascular: + S1,S2 ; RRR Soft systolic murmur at the left lower sternal border. No rubs noted.    Lungs: CTA b/l. No rhonchi, rales or wheezes.   Abdomen: + BS, soft. Non tender. Non distended. No rebound. No guarding.   Extremities: No clubbing/cyanosis/edema.   Neurologic: Moves all four extremities. Full range of motion.   Skin: Warm and moist. The patient's skin has normal elasticity and good skin turgor.   Psychiatric: cannot eval  Musculoskeletal: Normal range of motion, normal strength    DATA    tele- NSR 50s      ECG:  	AF 140s    < from: TTE W or WO Ultrasound Enhancing Agent (09.26.24 @ 15:20) >     CONCLUSIONS:      1. Left ventricular systolic function is normal with an ejection fraction of 69 % by Alex's method ofdisks.   2. Enlarged right ventricular cavity size and reduced right ventricular systolic function.   3. Left atrium is normal in size.   4. The aortic valve appears trileaflet with reduced systolic excursion. There is moderate aortic stenosis. The peak transaortic velocity is 2.37 m/s, peak transaortic gradient is 22.5 mmHg and mean transaortic gradient is 11.0 mmHg with an LVOT/aortic valve VTI ratio of 0.39. The aortic valve area is estimated at 1.21 cm² by the continuity equation.   5. Estimated pulmonary artery systolic pressure is 57 mmHg, consistent with moderate pulmonary hypertension.   6. No pericardial effusion seen.  < end of copied text >      ASSESSMENT/PLAN: 	  89M with PMH cognitive dysfunction (AAOx2 at baseline), advanced COPD on daily prednisone with chronic respiratory failure on 4L NC, CAD/MI s/p stent 15 years ago, HLD, and BPH BIBEMS from [facility] for confusion in setting of suspected pneumonia. Cardiology consulted for New onset Rapid Afib /?flutter today    --TTE noted with Normal LVEF, mod AS, mod Pulm HTN  --on Cardizem 30 Q 6 and converted to SR, given sinus bradycardia can decrease to cardizem CD 120mg daily  --Despite [id number] of atleast 3, given AMS/agitation/lung mass, hold off on full Ac for now  --Ct head/chest noted  --Blood cultures negative to date    Jessica VAZQUEZ  [phone]

## 2024-10-01 NOTE — PROGRESS NOTE ADULT - ASSESSMENT
89M with PMH cognitive dysfunction (AAOx2 at baseline), advanced COPD on daily prednisone with chronic respiratory failure on 4L NC, CAD/MI s/p stent, HLD, and BPH BIBEMS from [facility] for confusion x 2 days. Chest imaging suggestive of pulmonary edema vs pneumonia.         Problem/Plan - 1:  ·  Problem: Acute metabolic encephalopathy.   ·  Plan:   - without any focal deficits to suggest structural etiology- treatment for infection vs edema as below   - frequent reorientation  - maintain normal sleep/wake cycles (avoid waking patient between 11PM and 6AM unless medically necessary).    Problem/Plan - 2:·  Problem: Leukocytosis.   ·  Plan: mild leukocytosis to 12k on admission, infectious vs reactive. afebrile, not septic, hemodynamically stable. Without any new fevers, URI sxs, worsening of chronic cough, or increased O2 requirements. Procal negative. Favoring pulmonary edema over pneumonia at this time at this time   - ID fu     Problem/Plan - 3:·  Problem: Advanced COPD.   ·  Plan: low suspicion for COPD exacerbation, lung exam with localized rhonchi/coarse crackles over R>L base. No increased baseline O2 requirement or worsening of chronic cough  - continue home prednisone 10mg daily   - duonebs PRN.    Problem/Plan - 4:  ·  Problem: lung nodule   ·  Plan: - CT reviewed  - Family does not want baird or treatment   - family wants comfort measures only   Problem/Plan - 5:  ·  Problem: CAD (coronary artery disease).   ·  Plan: - continue home statin, plavix.    Problem/Plan - 6:  ·  Problem: BPH (benign prostatic hyperplasia).   ·  Plan: - continue home finasteride and tamsulosin.    Problem/Plan - 7:  ·  Problem: nEW aflutter   ·  Plan: cw tele  - sp BB  - cards fu

## 2024-10-01 NOTE — PROGRESS NOTE ADULT - SUBJECTIVE AND OBJECTIVE BOX
[facility], Division of Infectious Diseases  DAV Rudd Y. Patel, S. Shah, G. Osman  [phone]  ([phone] - weekdays after 5pm and weekends)    Name: MICHELLE CHEUNG  Age/Gender: 89y Male  MRN: [id number]    Interval History:  Notes reviewed.   No concerning overnight events.  Afebrile.     Allergies: No Known Allergies      Objective:  Vitals:   T(F): 97.8 (10-01-24 @ 05:10), Max: 98.3 (09-30-24 @ 12:00)  HR: 66 (10-01-24 @ 05:10) (59 - 66)  BP: 109/51 (10-01-24 @ 05:10) (106/67 - 115/65)  RR: 16 (10-01-24 @ 05:10) (16 - 18)  SpO2: 96% (10-01-24 @ 05:10) (95% - 98%)  Physical Examination:  General: no acute distress  HEENT: anicteric, NC  Cardio: S1, S2, normal rate  Resp: clear to auscultation anteriorly  Abd: soft, NT, ND  Ext: trace LE edema  Skin: warm, dry    Laboratory Studies:  CBC:                       13.4   7.84  )-----------( 186      ( 01 Oct 2024 05:44 )             42.7     WBC Trend:  7.84 10-01-24 @ 05:44  7.15 09-30-24 @ 06:20  9.16 09-29-24 @ 06:31  10.83 09-28-24 @ 07:48  8.05 09-27-24 @ 07:20  10.01 09-26-24 @ 07:00  12.21 09-25-24 @ 14:44    CMP: 10-01    144  |  108[H]  |  13  ----------------------------<  90  4.0   |  24  |  0.73    Ca    8.7      01 Oct 2024 05:44  Phos  3.1     10-01  Mg     1.90     10-01            Urinalysis Basic - ( 01 Oct 2024 05:44 )    Color: x / Appearance: x / SG: x / pH: x  Gluc: 90 mg/dL / Ketone: x  / Bili: x / Urobili: x   Blood: x / Protein: x / Nitrite: x   Leuk Esterase: x / RBC: x / WBC x   Sq Epi: x / Non Sq Epi: x / Bacteria: x      Microbiology: reviewed     Culture - Blood (collected 09-25-24 @ 20:25)  Source: .Blood BLOOD  Preliminary Report (09-30-24 @ 09:01):    No growth at 4 days    Culture - Blood (collected 09-25-24 @ 20:10)  Source: .Blood BLOOD  Preliminary Report (09-30-24 @ 09:01):    No growth at 4 days    Urinalysis with Rflx Culture (collected 09-25-24 @ 13:50)        Radiology: reviewed     Medications:  acetaminophen     Tablet .. 650 milliGRAM(s) Oral every 6 hours PRN  albuterol/ipratropium for Nebulization 3 milliLiter(s) Nebulizer every 6 hours PRN  albuterol/ipratropium for Nebulization. 3 milliLiter(s) Nebulizer once  aluminum hydroxide/magnesium hydroxide/simethicone Suspension 30 milliLiter(s) Oral every 4 hours PRN  atorvastatin 10 milliGRAM(s) Oral at bedtime  clopidogrel Tablet 75 milliGRAM(s) Oral daily  diltiazem    Tablet 30 milliGRAM(s) Oral every 6 hours  enoxaparin Injectable 40 milliGRAM(s) SubCutaneous every 24 hours  finasteride 5 milliGRAM(s) Oral daily  furosemide    Tablet 40 milliGRAM(s) Oral once  melatonin 3 milliGRAM(s) Oral at bedtime PRN  ondansetron Injectable 4 milliGRAM(s) IV Push every 8 hours PRN  predniSONE   Tablet 10 milliGRAM(s) Oral daily  senna 2 Tablet(s) Oral at bedtime  tamsulosin 0.4 milliGRAM(s) Oral at bedtime    Antimicrobials:

## 2024-10-02 LAB
ANION GAP SERPL CALC-SCNC: 11 MMOL/L — SIGNIFICANT CHANGE UP (ref 7–14)
BUN SERPL-MCNC: 17 MG/DL — SIGNIFICANT CHANGE UP (ref 7–23)
CALCIUM SERPL-MCNC: 8.8 MG/DL — SIGNIFICANT CHANGE UP (ref 8.4–10.5)
CHLORIDE SERPL-SCNC: 105 MMOL/L — SIGNIFICANT CHANGE UP (ref 98–107)
CO2 SERPL-SCNC: 25 MMOL/L — SIGNIFICANT CHANGE UP (ref 22–31)
CREAT SERPL-MCNC: 0.74 MG/DL — SIGNIFICANT CHANGE UP (ref 0.5–1.3)
EGFR: 84 ML/MIN/1.73M2 — SIGNIFICANT CHANGE UP
GLUCOSE SERPL-MCNC: 98 MG/DL — SIGNIFICANT CHANGE UP (ref 70–99)
HCT VFR BLD CALC: 40.8 % — SIGNIFICANT CHANGE UP (ref 39–50)
HGB BLD-MCNC: 13.2 G/DL — SIGNIFICANT CHANGE UP (ref 13–17)
MAGNESIUM SERPL-MCNC: 1.9 MG/DL — SIGNIFICANT CHANGE UP (ref 1.6–2.6)
MCHC RBC-ENTMCNC: 30.7 PG — SIGNIFICANT CHANGE UP (ref 27–34)
MCHC RBC-ENTMCNC: 32.4 GM/DL — SIGNIFICANT CHANGE UP (ref 32–36)
MCV RBC AUTO: 94.9 FL — SIGNIFICANT CHANGE UP (ref 80–100)
NRBC # BLD: 0 /100 WBCS — SIGNIFICANT CHANGE UP (ref 0–0)
NRBC # FLD: 0 K/UL — SIGNIFICANT CHANGE UP (ref 0–0)
PHOSPHATE SERPL-MCNC: 3.1 MG/DL — SIGNIFICANT CHANGE UP (ref 2.5–4.5)
PLATELET # BLD AUTO: 193 K/UL — SIGNIFICANT CHANGE UP (ref 150–400)
POTASSIUM SERPL-MCNC: 4.3 MMOL/L — SIGNIFICANT CHANGE UP (ref 3.5–5.3)
POTASSIUM SERPL-SCNC: 4.3 MMOL/L — SIGNIFICANT CHANGE UP (ref 3.5–5.3)
RBC # BLD: 4.3 M/UL — SIGNIFICANT CHANGE UP (ref 4.2–5.8)
RBC # FLD: 15.7 % — HIGH (ref 10.3–14.5)
SODIUM SERPL-SCNC: 141 MMOL/L — SIGNIFICANT CHANGE UP (ref 135–145)
WBC # BLD: 7.98 K/UL — SIGNIFICANT CHANGE UP (ref 3.8–10.5)
WBC # FLD AUTO: 7.98 K/UL — SIGNIFICANT CHANGE UP (ref 3.8–10.5)

## 2024-10-02 RX ORDER — OLANZAPINE 2.5 MG
2.5 TABLET ORAL ONCE
Refills: 0 | Status: COMPLETED | OUTPATIENT
Start: 2024-10-02 | End: 2024-10-02

## 2024-10-02 RX ORDER — BUDESONIDE, MICRONIZED 100 %
0.5 POWDER (GRAM) MISCELLANEOUS EVERY 12 HOURS
Refills: 0 | Status: DISCONTINUED | OUTPATIENT
Start: 2024-10-02 | End: 2024-10-16

## 2024-10-02 RX ADMIN — Medication 0.5 MILLIGRAM(S): at 21:13

## 2024-10-02 RX ADMIN — PREDNISONE 10 MILLIGRAM(S): 5 TABLET ORAL at 05:11

## 2024-10-02 RX ADMIN — IPRATROPIUM BROMIDE AND ALBUTEROL SULFATE 3 MILLILITER(S): .5; 3 SOLUTION RESPIRATORY (INHALATION) at 21:15

## 2024-10-02 RX ADMIN — FINASTERIDE 5 MILLIGRAM(S): 5 TABLET, FILM COATED ORAL at 11:12

## 2024-10-02 RX ADMIN — Medication 2.5 MILLIGRAM(S): at 22:34

## 2024-10-02 RX ADMIN — ENOXAPARIN SODIUM 40 MILLIGRAM(S): 150 INJECTION SUBCUTANEOUS at 11:13

## 2024-10-02 RX ADMIN — Medication 75 MILLIGRAM(S): at 11:13

## 2024-10-02 RX ADMIN — Medication 120 MILLIGRAM(S): at 05:11

## 2024-10-02 NOTE — DIETITIAN INITIAL EVALUATION ADULT - NS FNS DIET ORDER
Diet, Soft and Bite Sized:   DASH/TLC {Sodium & Cholesterol Restricted} (DASH) (09-25-24 @ 23:43) [Active]

## 2024-10-02 NOTE — DISCHARGE NOTE NURSING/CASE MANAGEMENT/SOCIAL WORK - PATIENT PORTAL LINK FT
You can access the FollowMyHealth Patient Portal offered by [facility] by registering at the following website: [email or web address]. By joining [email or web address] FollowMyHealth portal, you will also be able to view your health information using other applications (apps) compatible with our system.

## 2024-10-02 NOTE — DIETITIAN INITIAL EVALUATION ADULT - ADD RECOMMEND
1. Consider liberalizing diet to regular, with no therapeutic restriction, to optimize po intake. Diet consistency defer to team/ SLP.   2. Recommend a speech and swallow eval to determine diet consistency.   3. Nutrition department to provide Mighty shake 4oz 1x/day (220kcal, 6gm pro) for nutrient support.   4. Continue to provide assistance in feeding.   5. Monitor weight, labs, po intake and tolerance, bowel movement, skin integrity.   6. Encourage PO intake and honor food preferences as able.

## 2024-10-02 NOTE — DIETITIAN INITIAL EVALUATION ADULT - PERTINENT MEDS FT
MEDICATIONS  (STANDING):  albuterol/ipratropium for Nebulization. 3 milliLiter(s) Nebulizer once  atorvastatin 10 milliGRAM(s) Oral at bedtime  buDESOnide    Inhalation Suspension 0.5 milliGRAM(s) Inhalation every 12 hours  clopidogrel Tablet 75 milliGRAM(s) Oral daily  diltiazem    milliGRAM(s) Oral daily  enoxaparin Injectable 40 milliGRAM(s) SubCutaneous every 24 hours  finasteride 5 milliGRAM(s) Oral daily  furosemide    Tablet 40 milliGRAM(s) Oral once  predniSONE   Tablet 10 milliGRAM(s) Oral daily  senna 2 Tablet(s) Oral at bedtime  tamsulosin 0.4 milliGRAM(s) Oral at bedtime    MEDICATIONS  (PRN):  acetaminophen     Tablet .. 650 milliGRAM(s) Oral every 6 hours PRN Temp greater or equal to 38C (100.4F), Mild Pain (1 - 3)  albuterol/ipratropium for Nebulization 3 milliLiter(s) Nebulizer every 6 hours PRN Shortness of Breath and/or Wheezing  aluminum hydroxide/magnesium hydroxide/simethicone Suspension 30 milliLiter(s) Oral every 4 hours PRN Dyspepsia  melatonin 3 milliGRAM(s) Oral at bedtime PRN Insomnia  ondansetron Injectable 4 milliGRAM(s) IV Push every 8 hours PRN Nausea and/or Vomiting

## 2024-10-02 NOTE — DIETITIAN INITIAL EVALUATION ADULT - OTHER INFO
89M with PMH cognitive dysfunction (AAOx2 at baseline), advanced COPD on daily prednisone with chronic respiratory failure on 4L NC, CAD/MI s/p stent, HLD, and BPH BIBEMS from [facility] for confusion x 2 days. Chest imaging suggestive of pulmonary edema vs pneumonia, per chart.     Patient is A&Ox2, per chart. Information obtained from RN and extensive chart review. Per RN, patient is consuming ~75% of meals. Per RN flow sheet, patient's meal consumption varies from %. No recent episodes of any difficulty chewing or swallowing current diet consistency, any nausea, vomiting, diarrhea, constipation reported at this time. Last bowel movement 10/2, per RN. Per NH documentation, patient is receiving a regular diet (modified regular texture, thin consistency), receiving LPS SF 30ml 2x/day (200kcal, 30gm pro) PTA. Consider a speech and swallow eval to determine diet consistency. Current weight: 77.1kg/170lbs (9/26, per RN flow sheet). Per Ammy STARK, weight history: 78kg in 2021. No significant weight changes noted x 3 years. Continue to monitor weight trend. Patient has no known food allergies, per chart.

## 2024-10-02 NOTE — PROGRESS NOTE ADULT - SUBJECTIVE AND OBJECTIVE BOX
EP Attending  HISTORY OF PRESENT ILLNESS: HPI:  89M with PMH cognitive dysfunction (AAOx2 at baseline), advanced COPD on daily prednisone with chronic respiratory failure on 4L NC, CAD/MI s/p stent, HLD, and BPH BIBEMS from [facility] for confusion in setting of suspected pneumonia. Pt does not know why he was brought to the hospital, unable to provide much history. Collateral obtained from daughter/HCP Meghan Gonzalez. Pt noted to have intermittent episodes of confusion by NH staff over the past 2 days, occasionally become agitated, saying nonsensical things, all unlike him. Pt is AAOx2 at baseline, would not know the year, but otherwise conversational. Whenever he was visited by daughter or son though, he was found to be at his baseline. He had a UA done that was unremarkable and a CXR that showed "pulmonary vascular congestion with patchy bibasilar infriltrates"- copy in pt's chart. And he was started on doxycycline yesterday, unclear how many doses he took. Pt with a chronic cough, did not notice anything like increased cough or sputum production from baseline. No witnessed episodes of shortness of breath, no increase in baseline oxygen requirement. No known fevers. Meghan believes patient has a history of heart failure and takes lasix regularly, however, heart failure not listed as a diagnosis from NH or [facility] outpatient pulm notes, lasix also not listed on medication list from NH or surescripts.   Pt himself currently feels well. Denies having any trouble breathing, chest pain, abdominal pain, or any other complaints.     In ED:  On arrivalL afebrile, hemodynamically stable, saturating 88% on 4L NC, briefly required 6L but now weaned back down to 4%, saturating >90%.   Labs notable for leukocytosis to 12k, elevated proBNP 3351, VBG with pH 7.37/50, lactate 2.6  UA negative for infection  CXR with reticular opacities over bibasilar lungs on prelim read    Given CTX and azithromycin.   Admitted to medicine for further evaluation.  (25 Sep 2024 22:23)    Date of service 10/2- resting in bed, no new complaints.    acetaminophen     Tablet .. 650 milliGRAM(s) Oral every 6 hours PRN  albuterol/ipratropium for Nebulization 3 milliLiter(s) Nebulizer every 6 hours PRN  albuterol/ipratropium for Nebulization. 3 milliLiter(s) Nebulizer once  aluminum hydroxide/magnesium hydroxide/simethicone Suspension 30 milliLiter(s) Oral every 4 hours PRN  atorvastatin 10 milliGRAM(s) Oral at bedtime  clopidogrel Tablet 75 milliGRAM(s) Oral daily  diltiazem    milliGRAM(s) Oral daily  enoxaparin Injectable 40 milliGRAM(s) SubCutaneous every 24 hours  finasteride 5 milliGRAM(s) Oral daily  furosemide    Tablet 40 milliGRAM(s) Oral once  melatonin 3 milliGRAM(s) Oral at bedtime PRN  ondansetron Injectable 4 milliGRAM(s) IV Push every 8 hours PRN  predniSONE   Tablet 10 milliGRAM(s) Oral daily  senna 2 Tablet(s) Oral at bedtime  tamsulosin 0.4 milliGRAM(s) Oral at bedtime                            13.2   7.98  )-----------( 193      ( 02 Oct 2024 05:55 )             40.8       10-02    141  |  105  |  17  ----------------------------<  98  4.3   |  25  |  0.74    Ca    8.8      02 Oct 2024 05:55  Phos  3.1     10-02  Mg     1.90     10-02    T(C): 36.8 (10-02-24 @ 12:08), Max: 36.9 (10-01-24 @ 19:54)  HR: 70 (10-02-24 @ 12:08) (64 - 87)  BP: 106/94 (10-02-24 @ 12:08) (106/94 - 118/68)  RR: 18 (10-02-24 @ 12:08) (18 - 18)  SpO2: 99% (10-02-24 @ 12:08) (94% - 99%)  Wt(kg): --    I&O's Summary    Appearance: elderly man, sleepy and confused.	  HEENT:   Normal oral mucosa, PERRL, EOMI	  Lymphatic: No lymphadenopathy , no edema  Cardiovascular: Normal S1 S2, No JVD, No murmurs , Peripheral pulses palpable 2+ bilaterally  Respiratory: Lungs clear to auscultation, normal effort 	  Gastrointestinal:  Soft, Non-tender, + BS	  Skin: No rashes, No ecchymoses, No cyanosis, warm to touch  Musculoskeletal: Normal range of motion, normal strength  Psychiatry:  Mood & affect appropriate    TELEMETRY: AFib/AFlutter	    Echo: < from: TTE W or WO Ultrasound Enhancing Agent (09.26.24 @ 15:20) >     1. Left ventricular systolic function is normal with an ejection fraction of 69 % by Alex's method ofdisks.   2. Enlarged right ventricular cavity size and reduced right ventricular systolic function.   3. Left atrium is normal in size.   4. The aortic valve appears trileaflet with reduced systolic excursion. There is moderate aortic stenosis. The peak transaortic velocity is 2.37 m/s, peak transaortic gradient is 22.5 mmHg and mean transaortic gradient is 11.0 mmHg with an LVOT/aortic valve VTI ratio of 0.39. The aortic valve area is estimated at 1.21 cm² by the continuity equation.   5. Estimated pulmonary artery systolic pressure is 57 mmHg, consistent with moderate pulmonary hypertension.   6. No pericardial effusion seen.    < end of copied text >  	  ASSESSMENT/PLAN: Mr Barajas is a 89y Male here from nursing home w/ shortness of breath.  Has COPD, may have superimposed pneumonia.  New dx of atrial arrhythmia, may be causing acute diastolic heart failure.  Continue AV aniket blockade w/ diltiazem.  Room to increase, with holding parameters for SBP<100bpm.  Backup plan is a Digoxin load.  Awaiting remainder of workup and goals of care before starting oral anticoagulation for stroke prevention.  Will follow with you.    Tarik Marin M.D.  Cardiac Electrophysiology    office [phone]  pager [phone]

## 2024-10-02 NOTE — DISCHARGE NOTE NURSING/CASE MANAGEMENT/SOCIAL WORK - NSDCPEFALRISK_GEN_ALL_CORE
For information on Fall & Injury Prevention, visit: [email or web address] OR  [email or web address] OR  https://www.cdc.gov/steadi/patient.html

## 2024-10-02 NOTE — PROGRESS NOTE ADULT - SUBJECTIVE AND OBJECTIVE BOX
Date of Service: 10-02-24 @ 12:38    Patient is a 89y old  Male who presents with a chief complaint of acute metabolic encephalopathy (02 Oct 2024 12:32)      Any change in ROS: seems ok: lying  comfortably in bed:  he iso n 4 L ofoxygen  : satting at 99%:       MEDICATIONS  (STANDING):  albuterol/ipratropium for Nebulization. 3 milliLiter(s) Nebulizer once  atorvastatin 10 milliGRAM(s) Oral at bedtime  clopidogrel Tablet 75 milliGRAM(s) Oral daily  diltiazem    milliGRAM(s) Oral daily  enoxaparin Injectable 40 milliGRAM(s) SubCutaneous every 24 hours  finasteride 5 milliGRAM(s) Oral daily  furosemide    Tablet 40 milliGRAM(s) Oral once  predniSONE   Tablet 10 milliGRAM(s) Oral daily  senna 2 Tablet(s) Oral at bedtime  tamsulosin 0.4 milliGRAM(s) Oral at bedtime    MEDICATIONS  (PRN):  acetaminophen     Tablet .. 650 milliGRAM(s) Oral every 6 hours PRN Temp greater or equal to 38C (100.4F), Mild Pain (1 - 3)  albuterol/ipratropium for Nebulization 3 milliLiter(s) Nebulizer every 6 hours PRN Shortness of Breath and/or Wheezing  aluminum hydroxide/magnesium hydroxide/simethicone Suspension 30 milliLiter(s) Oral every 4 hours PRN Dyspepsia  melatonin 3 milliGRAM(s) Oral at bedtime PRN Insomnia  ondansetron Injectable 4 milliGRAM(s) IV Push every 8 hours PRN Nausea and/or Vomiting    Vital Signs Last 24 Hrs  T(C): 36.8 (02 Oct 2024 12:08), Max: 36.9 (01 Oct 2024 19:54)  T(F): 98.3 (02 Oct 2024 12:08), Max: 98.5 (01 Oct 2024 19:54)  HR: 70 (02 Oct 2024 12:08) (64 - 87)  BP: 106/94 (02 Oct 2024 12:08) (106/94 - 118/68)  BP(mean): --  RR: 18 (02 Oct 2024 12:08) (18 - 18)  SpO2: 99% (02 Oct 2024 12:08) (94% - 99%)    Parameters below as of 02 Oct 2024 12:08  Patient On (Oxygen Delivery Method): nasal cannula  O2 Flow (L/min): 4      I&O's Summary        Physical Exam:   GENERAL: NAD, well-groomed, well-developed  HEENT: SUGEY/   Atraumatic, Normocephalic  ENMT: No tonsillar erythema, exudates, or enlargement; Moist mucous membranes, Good dentition, No lesions  NECK: Supple, No JVD, Normal thyroid  CHEST/LUNG: Clear to auscultaion  CVS: Regular rate and rhythm; No murmurs, rubs, or gallops  GI: : Soft, Nontender,open eysys   EXTREMITIES:- edema  LYMPH: No lymphadenopathy noted  SKIN: No rashes or lesions  ENDOCRINOLOGY: No Thyromegaly  PSYCH: confused    Labs:  28, 29                            13.2   7.98  )-----------( 193      ( 02 Oct 2024 05:55 )             40.8                         13.4   7.84  )-----------( 186      ( 01 Oct 2024 05:44 )             42.7                         13.6   7.15  )-----------( 202      ( 30 Sep 2024 06:20 )             43.2                         14.1   9.16  )-----------( 227      ( 29 Sep 2024 06:31 )             43.8     10-02    141  |  105  |  17  ----------------------------<  98  4.3   |  25  |  0.74  10-01    144  |  108[H]  |  13  ----------------------------<  90  4.0   |  24  |  0.73  09-30    145  |  106  |  15  ----------------------------<  93  4.0   |  25  |  0.86  09-29    143  |  106  |  12  ----------------------------<  131[H]  4.2   |  24  |  0.78    Ca    8.8      02 Oct 2024 05:55  Ca    8.7      01 Oct 2024 05:44  Phos  3.1     10-02  Phos  3.1     10-01  Mg     1.90     10-02  Mg     1.90     10-01      CAPILLARY BLOOD GLUCOSE    < from: CT Head No Cont (09.29.24 @ 13:10) >  appearance.    VISUALIZED SINUSES:  Clear.  TYMPANOMASTOID CAVITIES:  Clear.  VISUALIZED ORBITS: Bilateral lens replacement.  CALVARIUM: Intact.    MISCELLANEOUS: Incidental and partially imaged 1.5 x 2.4 cm soft tissue   attenuating mass in the left parotid gland at superior retrocondylar   location, and more peripheral 1.1 cm nodule that may be another tumor or   lymph node.      IMPRESSION:    No acute intracranial findings.    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 ENT referral is advised, and   further imaging with neck CT or MRI can be done as outpatient and tissue   sampling under ultrasound guidance.    --- End of Report ---          EMMA GUERRERO MD; Resident Radiologist  This document has been electronically signed.  OPAL RAMOS MD; Attending Radiologist  This document has been electronically signed. Sep 29 2024  1:25PM    < end of copied text >            Urinalysis Basic - ( 02 Oct 2024 05:55 )    Color: x / Appearance: x / SG: x / pH: x  Gluc: 98 mg/dL / Ketone: x  / Bili: x / Urobili: x   Blood: x / Protein: x / Nitrite: x   Leuk Esterase: x / RBC: x / WBC x   Sq Epi: x / Non Sq Epi: x / Bacteria: x            RECENT CULTURES:  09-25 @ 20:25 .Blood BLOOD                No growth at 5 days    09-25 @ 20:10 .Blood BLOOD                No growth at 5 days          RESPIRATORY CULTURES:          Studies  Chest X-RAY  CT SCAN Chest   Venous Dopplers: LE:   CT Abdomen  Others

## 2024-10-02 NOTE — PROGRESS NOTE ADULT - SUBJECTIVE AND OBJECTIVE BOX
Patient is a 89y old  Male who presents with a chief complaint of acute metabolic encephalopathy (02 Oct 2024 12:38)    Date of servie : 10-02-24 @ 14:14  INTERVAL HPI/OVERNIGHT EVENTS:  T(C): 36.8 (10-02-24 @ 12:08), Max: 36.9 (10-01-24 @ 19:54)  HR: 70 (10-02-24 @ 12:08) (64 - 87)  BP: 106/94 (10-02-24 @ 12:08) (106/94 - 118/68)  RR: 18 (10-02-24 @ 12:08) (18 - 18)  SpO2: 99% (10-02-24 @ 12:08) (94% - 99%)  Wt(kg): --  I&O's Summary      LABS:                        13.2   7.98  )-----------( 193      ( 02 Oct 2024 05:55 )             40.8     10-02    141  |  105  |  17  ----------------------------<  98  4.3   |  25  |  0.74    Ca    8.8      02 Oct 2024 05:55  Phos  3.1     10-02  Mg     1.90     10-02        Urinalysis Basic - ( 02 Oct 2024 05:55 )    Color: x / Appearance: x / SG: x / pH: x  Gluc: 98 mg/dL / Ketone: x  / Bili: x / Urobili: x   Blood: x / Protein: x / Nitrite: x   Leuk Esterase: x / RBC: x / WBC x   Sq Epi: x / Non Sq Epi: x / Bacteria: x      CAPILLARY BLOOD GLUCOSE            Urinalysis Basic - ( 02 Oct 2024 05:55 )    Color: x / Appearance: x / SG: x / pH: x  Gluc: 98 mg/dL / Ketone: x  / Bili: x / Urobili: x   Blood: x / Protein: x / Nitrite: x   Leuk Esterase: x / RBC: x / WBC x   Sq Epi: x / Non Sq Epi: x / Bacteria: x        MEDICATIONS  (STANDING):  albuterol/ipratropium for Nebulization. 3 milliLiter(s) Nebulizer once  atorvastatin 10 milliGRAM(s) Oral at bedtime  buDESOnide    Inhalation Suspension 0.5 milliGRAM(s) Inhalation every 12 hours  clopidogrel Tablet 75 milliGRAM(s) Oral daily  diltiazem    milliGRAM(s) Oral daily  enoxaparin Injectable 40 milliGRAM(s) SubCutaneous every 24 hours  finasteride 5 milliGRAM(s) Oral daily  furosemide    Tablet 40 milliGRAM(s) Oral once  predniSONE   Tablet 10 milliGRAM(s) Oral daily  senna 2 Tablet(s) Oral at bedtime  tamsulosin 0.4 milliGRAM(s) Oral at bedtime    MEDICATIONS  (PRN):  acetaminophen     Tablet .. 650 milliGRAM(s) Oral every 6 hours PRN Temp greater or equal to 38C (100.4F), Mild Pain (1 - 3)  albuterol/ipratropium for Nebulization 3 milliLiter(s) Nebulizer every 6 hours PRN Shortness of Breath and/or Wheezing  aluminum hydroxide/magnesium hydroxide/simethicone Suspension 30 milliLiter(s) Oral every 4 hours PRN Dyspepsia  melatonin 3 milliGRAM(s) Oral at bedtime PRN Insomnia  ondansetron Injectable 4 milliGRAM(s) IV Push every 8 hours PRN Nausea and/or Vomiting          PHYSICAL EXAM:  GENERAL: NAD, well-groomed, well-developed  HEAD:  Atraumatic, Normocephalic  CHEST/LUNG: Clear to percussion bilaterally; No rales, rhonchi, wheezing, or rubs  HEART: Regular rate and rhythm; No murmurs, rubs, or gallops  ABDOMEN: Soft, Nontender, Nondistended; Bowel sounds present  EXTREMITIES:  edema +    Care Discussed with Consultants/Other Providers [ ] YES  [ ] NO

## 2024-10-02 NOTE — PROGRESS NOTE ADULT - ASSESSMENT
89M with PMH cognitive dysfunction (AAOx2 at baseline), advanced COPD on daily prednisone with chronic respiratory failure on 4L NC, CAD/MI s/p stent, HLD, and BPH BIBEMS from [facility] for confusion in setting of suspected pneumonia. Pt does not know why he was brought to the hospital, unable to provide much history. Collateral obtained from daughter/HCP Meghan Gonzalez. Pt noted to have intermittent episodes of confusion by NH staff over the past 2 days, occasionally become agitated, saying nonsensical things, all unlike him. Pt is AAOx2 at baseline, would not know the year, but otherwise conversational. Whenever he was visited by daughter or son though, he was found to be at his baseline. He had a UA done that was unremarkable and a CXR that showed "pulmonary vascular congestion with patchy bibasilar infriltrates"- copy in pt's chart. And he was started on doxycycline yesterday, unclear how many doses he took. Pt with a chronic cough, did not notice anything like increased cough or sputum production from baseline. No witnessed episodes of shortness of breath, no increase in baseline oxygen requirement. No known fevers. Meghan believes patient has a history of heart failure and takes lasix regularly, however, heart failure not listed as a diagnosis from NH or [facility] outpatient pulm notes, lasix also not listed on medication list from NH or surescripts.   Pt himself currently feels well. Denies having any trouble breathing, chest pain, abdominal pain, or any other complaints.     Acute metabolic encephalopathy.   -check repeat CT head -NOT DONE YET;  WAS CANCELLED   - could be related to infection?  pneumonia,  uti etc:  being rule dout  - his VBG is fine with no retention of co2:  so thats not the reason of him getting more confused;     9/29;  -he is still confused?  baseline not sure;   -he is not wheezing;  awaiting ct chest   -heis still on 5 L:  try to bring down ; sao2 gaol is 88%; ex smoker     9/30: he is still  pretty confused;    -on chr prednisone for copd;   he is not wheezing    10/1; seems to be doing  ok ;on 4 L of oxygen :  10/2: on 4 L of oxgen ; try to minimise o x as tlerated     Mild leukocytosis to 12k on admission  - infectious vs reactive. afebrile, not septic, hemodynamically stable.   -Without any new fevers,   -URI sxs, worsening of chronic cough, or increased O2 requirements  - Procal negative.  - cw abx as per ID  ; to cont for now;   -needs ct chest     9/29; -on no antibiotics   -afebrile today  ;  wbc is normal today     9/30: remains afebrile and wbc is normal   10/1: seems OK:   10/2: cont current care:   Advanced COPD.   -ow suspicion for COPD exacerbation, lung exam with localized rhonchi/coarse crackles over R>L base. No increased baseline O2 requirement or worsening of chronic cough  - continue home prednisone 10mg daily   - duonebs PRN.  -and his VBg also did not show any  significant retention ofpco2:     9/30:  -cont current rx:  he seems stable:   -ct chest showed; Extensive emphysematous changes. Bibasilar predominant peripheral reticular opacities, traction bronchiectasis, and subpleural cystic changes, compatible with   interstitial lung disease. Right lung pulmonary nodules, measuring up to 1.8 cm, concerning for neoplasia.  Consider PET scan or 3 month follow up. Small left pleural effusion with basilar pleural thickening. Partially imaged left parotid gland mass. Consider further evaluation with contrast-enhanced MR neck from more definitive characterization.    10/1:  dw daughter : she will decide about the pulm nodule:  and has parotid gland tumor ; work up per primary team     Chronic hypoxic respiratory failure.   ·  Plan: - continue home NC at 4L NC, goal 88-92&-likely due to copd   10/2; seems to be doing  ok :     CAD (coronary artery disease).    - continue home statin, plavix.  -NEW aflutter   -echo showed;  1. Left ventricular systolic function is normal with an ejection fraction of 69 % by Alex's method of disks.   2. Enlarged right ventricular cavity size and reduced right ventricular systolic function.   3. Left atrium is normal in size.   4. The aortic valve appears trileaflet with reduced systolic excursion. There is moderate aortic stenosis. The peak transaortic velocity is 2.37 m/s, peak transaortic gradient is 22.5 mmHg and mean transaortic gradient is 11.0 mmHg with an LVOT/aortic valve VTI ratio of 0.39. The aortic valve area is estimated at 1.21 cm² by the continuity equation.   5. Estimated pulmonary artery systolic pressure is 57 mmHg, consistent with moderate pulmonary hypertension.    defer to cards    6. No pericardial effusion seen.      dw acp and PMD

## 2024-10-02 NOTE — PROGRESS NOTE ADULT - SUBJECTIVE AND OBJECTIVE BOX
Date of service 10/02/24    No pain or SOB, pleasantly confused, unable to obtain ROS    MEDS  acetaminophen     Tablet .. 650 milliGRAM(s) Oral every 6 hours PRN  albuterol/ipratropium for Nebulization 3 milliLiter(s) Nebulizer every 6 hours PRN  albuterol/ipratropium for Nebulization. 3 milliLiter(s) Nebulizer once  aluminum hydroxide/magnesium hydroxide/simethicone Suspension 30 milliLiter(s) Oral every 4 hours PRN  atorvastatin 10 milliGRAM(s) Oral at bedtime  clopidogrel Tablet 75 milliGRAM(s) Oral daily  diltiazem    milliGRAM(s) Oral daily  enoxaparin Injectable 40 milliGRAM(s) SubCutaneous every 24 hours  finasteride 5 milliGRAM(s) Oral daily  furosemide    Tablet 40 milliGRAM(s) Oral once  melatonin 3 milliGRAM(s) Oral at bedtime PRN  ondansetron Injectable 4 milliGRAM(s) IV Push every 8 hours PRN  predniSONE   Tablet 10 milliGRAM(s) Oral daily  senna 2 Tablet(s) Oral at bedtime  tamsulosin 0.4 milliGRAM(s) Oral at bedtime                            13.2   7.98  )-----------( 193      ( 02 Oct 2024 05:55 )             40.8       10-02    141  |  105  |  17  ----------------------------<  98  4.3   |  25  |  0.74    Ca    8.8      02 Oct 2024 05:55  Phos  3.1     10-02  Mg     1.90     10-02      T(C): 36.8 (10-02-24 @ 12:08), Max: 36.9 (10-01-24 @ 19:54)  HR: 70 (10-02-24 @ 12:08) (64 - 87)  BP: 106/94 (10-02-24 @ 12:08) (106/94 - 118/68)  RR: 18 (10-02-24 @ 12:08) (18 - 18)  SpO2: 99% (10-02-24 @ 12:08) (94% - 99%)  Wt(kg): --    General: Well nourished in no acute distress.  Head: Normocephalic and atraumatic.   Neck: No JVD. No bruits. Supple. Does not appear to be enlarged.   Cardiovascular: + S1,S2 ; RRR Soft systolic murmur at the left lower sternal border. No rubs noted.    Lungs: CTA b/l. No rhonchi, rales or wheezes.   Abdomen: + BS, soft. Non tender. Non distended. No rebound. No guarding.   Extremities: No clubbing/cyanosis/edema.   Neurologic: Moves all four extremities. Full range of motion.   Skin: Warm and moist. The patient's skin has normal elasticity and good skin turgor.   Psychiatric: cannot eval  Musculoskeletal: Normal range of motion, normal strength    DATA    tele- NSR 50s      ECG:  	AF 140s    < from: TTE W or WO Ultrasound Enhancing Agent (09.26.24 @ 15:20) >     CONCLUSIONS:      1. Left ventricular systolic function is normal with an ejection fraction of 69 % by Alex's method ofdisks.   2. Enlarged right ventricular cavity size and reduced right ventricular systolic function.   3. Left atrium is normal in size.   4. The aortic valve appears trileaflet with reduced systolic excursion. There is moderate aortic stenosis. The peak transaortic velocity is 2.37 m/s, peak transaortic gradient is 22.5 mmHg and mean transaortic gradient is 11.0 mmHg with an LVOT/aortic valve VTI ratio of 0.39. The aortic valve area is estimated at 1.21 cm² by the continuity equation.   5. Estimated pulmonary artery systolic pressure is 57 mmHg, consistent with moderate pulmonary hypertension.   6. No pericardial effusion seen.  < end of copied text >      ASSESSMENT/PLAN: 	  89M with PMH cognitive dysfunction (AAOx2 at baseline), advanced COPD on daily prednisone with chronic respiratory failure on 4L NC, CAD/MI s/p stent 15 years ago, HLD, and BPH BIBEMS from [facility] for confusion in setting of suspected pneumonia. Cardiology consulted for New onset Rapid Afib /?flutter today    --TTE noted with Normal LVEF, mod AS, mod Pulm HTN  --remains in SR on cardizem CD 120mg daily  --Despite [id number] of atleast 3, given AMS/agitation/lung mass, hold off on full Ac for now  --Ct head/chest noted--family does ot want malignancy w/u, spoke with daughter states that he is fairly bedbound over last couple months and needs help with ADLs also reports that since his mental status has been waxing and waning would prefer to hold of AC for now and its bleeding risk  --Blood cultures negative to date    Jessica VAZQUEZ  [phone]                Date of service 10/02/24    No pain or SOB, pleasantly confused, unable to obtain ROS    MEDS  acetaminophen     Tablet .. 650 milliGRAM(s) Oral every 6 hours PRN  albuterol/ipratropium for Nebulization 3 milliLiter(s) Nebulizer every 6 hours PRN  albuterol/ipratropium for Nebulization. 3 milliLiter(s) Nebulizer once  aluminum hydroxide/magnesium hydroxide/simethicone Suspension 30 milliLiter(s) Oral every 4 hours PRN  atorvastatin 10 milliGRAM(s) Oral at bedtime  clopidogrel Tablet 75 milliGRAM(s) Oral daily  diltiazem    milliGRAM(s) Oral daily  enoxaparin Injectable 40 milliGRAM(s) SubCutaneous every 24 hours  finasteride 5 milliGRAM(s) Oral daily  furosemide    Tablet 40 milliGRAM(s) Oral once  melatonin 3 milliGRAM(s) Oral at bedtime PRN  ondansetron Injectable 4 milliGRAM(s) IV Push every 8 hours PRN  predniSONE   Tablet 10 milliGRAM(s) Oral daily  senna 2 Tablet(s) Oral at bedtime  tamsulosin 0.4 milliGRAM(s) Oral at bedtime                            13.2   7.98  )-----------( 193      ( 02 Oct 2024 05:55 )             40.8       10-02    141  |  105  |  17  ----------------------------<  98  4.3   |  25  |  0.74    Ca    8.8      02 Oct 2024 05:55  Phos  3.1     10-02  Mg     1.90     10-02      T(C): 36.8 (10-02-24 @ 12:08), Max: 36.9 (10-01-24 @ 19:54)  HR: 70 (10-02-24 @ 12:08) (64 - 87)  BP: 106/94 (10-02-24 @ 12:08) (106/94 - 118/68)  RR: 18 (10-02-24 @ 12:08) (18 - 18)  SpO2: 99% (10-02-24 @ 12:08) (94% - 99%)  Wt(kg): --    General: Well nourished in no acute distress.  Head: Normocephalic and atraumatic.   Neck: No JVD. No bruits. Supple. Does not appear to be enlarged.   Cardiovascular: + S1,S2 ; RRR Soft systolic murmur at the left lower sternal border. No rubs noted.    Lungs: CTA b/l. No rhonchi, rales or wheezes.   Abdomen: + BS, soft. Non tender. Non distended. No rebound. No guarding.   Extremities: No clubbing/cyanosis/edema.   Neurologic: Moves all four extremities. Full range of motion.   Skin: Warm and moist. The patient's skin has normal elasticity and good skin turgor.   Psychiatric: cannot eval  Musculoskeletal: Normal range of motion, normal strength    DATA    tele- NSR 50s      ECG:  	AF 140s    < from: TTE W or WO Ultrasound Enhancing Agent (09.26.24 @ 15:20) >     CONCLUSIONS:      1. Left ventricular systolic function is normal with an ejection fraction of 69 % by Alex's method ofdisks.   2. Enlarged right ventricular cavity size and reduced right ventricular systolic function.   3. Left atrium is normal in size.   4. The aortic valve appears trileaflet with reduced systolic excursion. There is moderate aortic stenosis. The peak transaortic velocity is 2.37 m/s, peak transaortic gradient is 22.5 mmHg and mean transaortic gradient is 11.0 mmHg with an LVOT/aortic valve VTI ratio of 0.39. The aortic valve area is estimated at 1.21 cm² by the continuity equation.   5. Estimated pulmonary artery systolic pressure is 57 mmHg, consistent with moderate pulmonary hypertension.   6. No pericardial effusion seen.  < end of copied text >      ASSESSMENT/PLAN: 	  89M with PMH cognitive dysfunction (AAOx2 at baseline), advanced COPD on daily prednisone with chronic respiratory failure on 4L NC, CAD/MI s/p stent 15 years ago, HLD, and BPH BIBEMS from [facility] for confusion in setting of suspected pneumonia. Cardiology consulted for New onset Rapid Afib /?flutter today    --TTE noted with Normal LVEF, mod AS, mod Pulm HTN  --remains in SR on cardizem CD 120mg daily  --Despite [id number] of atleast 3, given AMS/agitation/lung mass, hold off on full Ac for now  --Ct head/chest noted--family does not want malignancy w/u, spoke with daughter states that he is fairly bedbound over last couple months and needs help with ADLs also reports that since his mental status has been waxing and waning would prefer to hold of AC for now and its bleeding risk  --Blood cultures negative to date    Jessica VAZQUEZ  [phone]

## 2024-10-02 NOTE — DIETITIAN INITIAL EVALUATION ADULT - PERTINENT LABORATORY DATA
10-02    141  |  105  |  17  ----------------------------<  98  4.3   |  25  |  0.74    Ca    8.8      02 Oct 2024 05:55  Phos  3.1     10-02  Mg     1.90     10-02

## 2024-10-02 NOTE — PROGRESS NOTE ADULT - ASSESSMENT
89M with PMH cognitive dysfunction (AAOx2 at baseline), advanced COPD on daily prednisone with chronic respiratory failure on 4L NC, CAD/MI s/p stent, HLD, and BPH BIBEMS from [facility] for confusion x 2 days. Chest imaging suggestive of pulmonary edema vs pneumonia.         Problem/Plan - 1:  ·  Problem: Acute metabolic encephalopathy.   ·  Plan:   - without any focal deficits to suggest structural etiology- treatment for infection vs edema as below   - frequent reorientation  - maintain normal sleep/wake cycles (avoid waking patient between 11PM and 6AM unless medically necessary).    Problem/Plan - 2:·  Problem: Leukocytosis.   ·  Plan: mild leukocytosis to 12k on admission, infectious vs reactive. afebrile, not septic, hemodynamically stable. Without any new fevers, URI sxs, worsening of chronic cough, or increased O2 requirements. Procal negative. Favoring pulmonary edema over pneumonia at this time at this time   - ID fu     Problem/Plan - 3:·  Problem: Advanced COPD.   ·  Plan: low suspicion for COPD exacerbation, lung exam with localized rhonchi/coarse crackles over R>L base. No increased baseline O2 requirement or worsening of chronic cough  - continue home prednisone 10mg daily   - duonebs PRN.    Problem/Plan - 4:  ·  Problem: lung nodule   ·  Plan: - CT reviewed  - Family does not want baird or treatment   - family wants comfort measures only Problem/Plan - 5:  ·  Problem: CAD (coronary artery disease).   ·  Plan: - continue home statin, plavix.    Problem/Plan - 6:  ·  Problem: BPH (benign prostatic hyperplasia).   ·  Plan: - continue home finasteride and tamsulosin.    Problem/Plan - 7:  ·  Problem: nEW aflutter   ·  Plan: cw tele  - sp BB  - cards fu

## 2024-10-03 RX ORDER — OLANZAPINE 2.5 MG
2.5 TABLET ORAL ONCE
Refills: 0 | Status: COMPLETED | OUTPATIENT
Start: 2024-10-03 | End: 2024-10-03

## 2024-10-03 RX ORDER — IPRATROPIUM BROMIDE AND ALBUTEROL SULFATE .5; 3 MG/3ML; MG/3ML
3 SOLUTION RESPIRATORY (INHALATION) ONCE
Refills: 0 | Status: COMPLETED | OUTPATIENT
Start: 2024-10-03 | End: 2024-10-03

## 2024-10-03 RX ADMIN — IPRATROPIUM BROMIDE AND ALBUTEROL SULFATE 3 MILLILITER(S): .5; 3 SOLUTION RESPIRATORY (INHALATION) at 22:53

## 2024-10-03 RX ADMIN — Medication 2.5 MILLIGRAM(S): at 23:24

## 2024-10-03 RX ADMIN — Medication 2.5 MILLIGRAM(S): at 11:48

## 2024-10-03 RX ADMIN — Medication 0.5 MILLIGRAM(S): at 20:39

## 2024-10-03 RX ADMIN — Medication 2.5 MILLIGRAM(S): at 22:52

## 2024-10-03 NOTE — PROGRESS NOTE ADULT - ASSESSMENT
89M with PMH cognitive dysfunction (AAOx2 at baseline), advanced COPD on daily prednisone with chronic respiratory failure on 4L NC, CAD/MI s/p stent, HLD, and BPH BIBEMS from [facility] for confusion x 2 days. Chest imaging suggestive of pulmonary edema vs pneumonia.         Problem/Plan - 1:  ·  Problem: Acute metabolic encephalopathy.   ·  Plan:   - without any focal deficits to suggest structural etiology- treatment for infection vs edema as below   - frequent reorientation- maintain normal sleep/wake cycles (avoid waking patient between 11PM and 6AM unless medically necessary).    Problem/Plan - 2:·  Problem: Leukocytosis.   ·  Plan: mild leukocytosis to 12k on admission, infectious vs reactive. afebrile, not septic, hemodynamically stable. Without any new fevers, URI sxs, worsening of chronic cough, or increased O2 requirements. Procal negative. Favoring pulmonary edema over pneumonia at this time at this time   - ID fu     Problem/Plan - 3:·  Problem: Advanced COPD.   ·  Plan: low suspicion for COPD exacerbation, lung exam with localized rhonchi/coarse crackles over R>L base. No increased baseline O2 requirement or worsening of chronic cough  - continue home prednisone 10mg daily   - duonebs PRN.    Problem/Plan - 4:  ·  Problem: lung nodule   ·  Plan: - CT reviewed  - Family does not want baird or treatment   - family wants comfort measures only     Problem/Plan - 5:  ·  Problem: CAD (coronary artery disease).   ·  Plan: - continue home statin, plavix.    Problem/Plan - 6:  ·  Problem: BPH (benign prostatic hyperplasia).   ·  Plan: - continue home finasteride and tamsulosin.    Problem/Plan - 7:  ·  Problem: nEW aflutter   ·  Plan: cw tele  - sp BB  - cards fu

## 2024-10-03 NOTE — PROGRESS NOTE ADULT - ASSESSMENT
89M with PMH cognitive dysfunction (AAOx2 at baseline), advanced COPD on daily prednisone with chronic respiratory failure on 4L NC, CAD/MI s/p stent, HLD, and BPH BIBEMS from [facility] for confusion in setting of suspected pneumonia. Pt does not know why he was brought to the hospital, unable to provide much history. Collateral obtained from daughter/HCP Meghan Gonzalez. Pt noted to have intermittent episodes of confusion by NH staff over the past 2 days, occasionally become agitated, saying nonsensical things, all unlike him. Pt is AAOx2 at baseline, would not know the year, but otherwise conversational. Whenever he was visited by daughter or son though, he was found to be at his baseline. He had a UA done that was unremarkable and a CXR that showed "pulmonary vascular congestion with patchy bibasilar infriltrates"- copy in pt's chart. And he was started on doxycycline yesterday, unclear how many doses he took. Pt with a chronic cough, did not notice anything like increased cough or sputum production from baseline. No witnessed episodes of shortness of breath, no increase in baseline oxygen requirement. No known fevers. Meghan believes patient has a history of heart failure and takes lasix regularly, however, heart failure not listed as a diagnosis from NH or [facility] outpatient pulm notes, lasix also not listed on medication list from NH or surescripts.   Pt himself currently feels well. Denies having any trouble breathing, chest pain, abdominal pain, or any other complaints.     Acute metabolic encephalopathy.   -check repeat CT head -NOT DONE YET;  WAS CANCELLED   - could be related to infection?  pneumonia,  uti etc:  being rule dout  - his VBG is fine with no retention of co2:  so thats not the reason of him getting more confused;     9/29;  -he is still confused?  baseline not sure;   -he is not wheezing;  awaiting ct chest   -heis still on 5 L:  try to bring down ; sao2 gaol is 88%; ex smoker     9/30: he is still  pretty confused;    -on chr prednisone for copd;   he is not wheezing    10/1; seems to be doing  ok ;on 4 L of oxygen :  10/2: on 4 L of oxygen ; try to minimise o x as tolerated  10/3:  called daughter : she is aware of him having malignancy probably:   -on prednisone  -daughter does not to work up for malignancy  :    Mild leukocytosis to 12k on admission  - infectious vs reactive. afebrile, not septic, hemodynamically stable.   -Without any new fevers,   -URI sxs, worsening of chronic cough, or increased O2 requirements  - Procal negative.  - cw abx as per ID  ; to cont for now;   -needs ct chest     9/29; -on no antibiotics   -afebrile today  ;  wbc is normal today     9/30: remains afebrile and wbc is normal   10/1: seems OK:   10/2: cont current care:   Advanced COPD.   -ow suspicion for COPD exacerbation, lung exam with localized rhonchi/coarse crackles over R>L base. No increased baseline O2 requirement or worsening of chronic cough  - continue home prednisone 10mg daily   - duonebs PRN.  -and his VBg also did not show any  significant retention ofpco2:     9/30:  -cont current rx:  he seems stable:   -ct chest showed; Extensive emphysematous changes. Bibasilar predominant peripheral reticular opacities, traction bronchiectasis, and subpleural cystic changes, compatible with   interstitial lung disease. Right lung pulmonary nodules, measuring up to 1.8 cm, concerning for neoplasia.  Consider PET scan or 3 month follow up. Small left pleural effusion with basilar pleural thickening. Partially imaged left parotid gland mass. Consider further evaluation with contrast-enhanced MR neck from more definitive characterization.    10/1:  dw daughter : she will decide about the pulm nodule:  and has parotid gland tumor ; work up per primary team     10/3: no workup per daughter     Chronic hypoxic respiratory failure.   ·  Plan: - continue home NC at 4L NC, goal 88-92&-likely due to copd   10/2; seems to be doing  ok :   10/03: cont 4 L of NC :  he isnot wheezing:  vbg ordered     CAD (coronary artery disease).    - continue home statin, plavix.  -NEW aflutter   -echo showed;  1. Left ventricular systolic function is normal with an ejection fraction of 69 % by Alex's method of disks.   2. Enlarged right ventricular cavity size and reduced right ventricular systolic function.   3. Left atrium is normal in size.   4. The aortic valve appears trileaflet with reduced systolic excursion. There is moderate aortic stenosis. The peak transaortic velocity is 2.37 m/s, peak transaortic gradient is 22.5 mmHg and mean transaortic gradient is 11.0 mmHg with an LVOT/aortic valve VTI ratio of 0.39. The aortic valve area is estimated at 1.21 cm² by the continuity equation.   5. Estimated pulmonary artery systolic pressure is 57 mmHg, consistent with moderate pulmonary hypertension.  defer to cards         dw acp

## 2024-10-03 NOTE — PROVIDER CONTACT NOTE (OTHER) - RECOMMENDATIONS
Pt remains on tele
ACP made aware
ACP made aware. ACP instructed to increase oxygen to 6L NC.
Zoll and atropine at the bedside
Hold PO meds, including PO cardizem at this time.  Patient already given 1x dose of 2.5mg zyprexa
Pt got Zyprexa at 12pm, still refusing po meds

## 2024-10-03 NOTE — PROGRESS NOTE ADULT - SUBJECTIVE AND OBJECTIVE BOX
DATE OF SERVICE: 10-03-24    No overnight events, patient agitated this AM    MEDICATIONS:  acetaminophen     Tablet .. 650 milliGRAM(s) Oral every 6 hours PRN  albuterol/ipratropium for Nebulization 3 milliLiter(s) Nebulizer every 6 hours PRN  albuterol/ipratropium for Nebulization. 3 milliLiter(s) Nebulizer once  aluminum hydroxide/magnesium hydroxide/simethicone Suspension 30 milliLiter(s) Oral every 4 hours PRN  atorvastatin 10 milliGRAM(s) Oral at bedtime  buDESOnide    Inhalation Suspension 0.5 milliGRAM(s) Inhalation every 12 hours  clopidogrel Tablet 75 milliGRAM(s) Oral daily  diltiazem    milliGRAM(s) Oral daily  enoxaparin Injectable 40 milliGRAM(s) SubCutaneous every 24 hours  finasteride 5 milliGRAM(s) Oral daily  furosemide    Tablet 40 milliGRAM(s) Oral once  melatonin 3 milliGRAM(s) Oral at bedtime PRN  OLANZapine Injectable 2.5 milliGRAM(s) IntraMuscular once  ondansetron Injectable 4 milliGRAM(s) IV Push every 8 hours PRN  predniSONE   Tablet 10 milliGRAM(s) Oral daily  senna 2 Tablet(s) Oral at bedtime  tamsulosin 0.4 milliGRAM(s) Oral at bedtime      LABS:                        13.2   7.98  )-----------( 193      ( 02 Oct 2024 05:55 )             40.8       Hemoglobin: 13.2 g/dL (10-02 @ 05:55)  Hemoglobin: 13.4 g/dL (10-01 @ 05:44)  Hemoglobin: 13.6 g/dL (09-30 @ 06:20)  Hemoglobin: 14.1 g/dL (09-29 @ 06:31)      10-02    141  |  105  |  17  ----------------------------<  98  4.3   |  25  |  0.74    Ca    8.8      02 Oct 2024 05:55  Phos  3.1     10-02  Mg     1.90     10-02      Creatinine Trend: 0.74<--, 0.73<--, 0.86<--, 0.78<--, 0.88<--, 0.76<--    COAGS:           PHYSICAL EXAM:  T(C): 36.6 (10-03-24 @ 05:20), Max: 36.8 (10-02-24 @ 12:08)  HR: 76 (10-03-24 @ 09:00) (65 - 85)  BP: 146/58 (10-03-24 @ 09:00) (106/94 - 146/82)  RR: 18 (10-03-24 @ 05:20) (18 - 18)  SpO2: 96% (10-03-24 @ 05:20) (92% - 99%)  Wt(kg): --    I&O's Summary        General: Well nourished in no acute distress.  Head: Normocephalic and atraumatic.   Neck: No JVD. No bruits. Supple. Does not appear to be enlarged.   Cardiovascular: + S1,S2 ; RRR Soft systolic murmur at the left lower sternal border. No rubs noted.    Lungs: CTA b/l. No rhonchi, rales or wheezes.   Abdomen: + BS, soft. Non tender. Non distended. No rebound. No guarding.   Extremities: No clubbing/cyanosis/edema.   Neurologic: Moves all four extremities. Full range of motion.   Skin: Warm and moist. The patient's skin has normal elasticity and good skin turgor.   Psychiatric: cannot eval  Musculoskeletal: Normal range of motion, normal strength    DATA    tele- NSR 50s      ECG:  	AF 140s    < from: TTE W or WO Ultrasound Enhancing Agent (09.26.24 @ 15:20) >     CONCLUSIONS:      1. Left ventricular systolic function is normal with an ejection fraction of 69 % by Alex's method ofdisks.   2. Enlarged right ventricular cavity size and reduced right ventricular systolic function.   3. Left atrium is normal in size.   4. The aortic valve appears trileaflet with reduced systolic excursion. There is moderate aortic stenosis. The peak transaortic velocity is 2.37 m/s, peak transaortic gradient is 22.5 mmHg and mean transaortic gradient is 11.0 mmHg with an LVOT/aortic valve VTI ratio of 0.39. The aortic valve area is estimated at 1.21 cm² by the continuity equation.   5. Estimated pulmonary artery systolic pressure is 57 mmHg, consistent with moderate pulmonary hypertension.   6. No pericardial effusion seen.  < end of copied text >      ASSESSMENT/PLAN: 	  89M with PMH cognitive dysfunction (AAOx2 at baseline), advanced COPD on daily prednisone with chronic respiratory failure on 4L NC, CAD/MI s/p stent 15 years ago, HLD, and BPH BIBEMS from [facility] for confusion in setting of suspected pneumonia. Cardiology consulted for New onset Rapid Afib /?flutter today    --TTE noted with Normal LVEF, mod AS, mod Pulm HTN  --remains in SR on cardizem CD 120mg daily  --Despite [id number] of atleast 3, given AMS/agitation/lung mass, hold off on full Ac for now  --Ct head/chest noted--family does not want malignancy w/u, spoke with daughter states that he is fairly bedbound over last couple months and needs help with ADLs also reports that since his mental status has been waxing and waning would prefer to hold of AC for now and its bleeding risk  --Blood cultures negative to date    Hyun Cooper MD  Pager: [phone]

## 2024-10-03 NOTE — PROGRESS NOTE ADULT - SUBJECTIVE AND OBJECTIVE BOX
Date of Service: 10-03-24 @ 11:27    Patient is a 89y old  Male who presents with a chief complaint of acute metabolic encephalopathy (03 Oct 2024 11:18)      Any change in ROS: He seems very agitated today  :  on 4 L of oxygen      MEDICATIONS  (STANDING):  albuterol/ipratropium for Nebulization. 3 milliLiter(s) Nebulizer once  atorvastatin 10 milliGRAM(s) Oral at bedtime  buDESOnide    Inhalation Suspension 0.5 milliGRAM(s) Inhalation every 12 hours  clopidogrel Tablet 75 milliGRAM(s) Oral daily  diltiazem    milliGRAM(s) Oral daily  enoxaparin Injectable 40 milliGRAM(s) SubCutaneous every 24 hours  finasteride 5 milliGRAM(s) Oral daily  furosemide    Tablet 40 milliGRAM(s) Oral once  OLANZapine Injectable 2.5 milliGRAM(s) IntraMuscular once  predniSONE   Tablet 10 milliGRAM(s) Oral daily  senna 2 Tablet(s) Oral at bedtime  tamsulosin 0.4 milliGRAM(s) Oral at bedtime    MEDICATIONS  (PRN):  acetaminophen     Tablet .. 650 milliGRAM(s) Oral every 6 hours PRN Temp greater or equal to 38C (100.4F), Mild Pain (1 - 3)  albuterol/ipratropium for Nebulization 3 milliLiter(s) Nebulizer every 6 hours PRN Shortness of Breath and/or Wheezing  aluminum hydroxide/magnesium hydroxide/simethicone Suspension 30 milliLiter(s) Oral every 4 hours PRN Dyspepsia  melatonin 3 milliGRAM(s) Oral at bedtime PRN Insomnia  ondansetron Injectable 4 milliGRAM(s) IV Push every 8 hours PRN Nausea and/or Vomiting    Vital Signs Last 24 Hrs  T(C): 36.6 (03 Oct 2024 05:20), Max: 36.8 (02 Oct 2024 12:08)  T(F): 97.8 (03 Oct 2024 05:20), Max: 98.3 (02 Oct 2024 12:08)  HR: 76 (03 Oct 2024 09:00) (65 - 85)  BP: 146/58 (03 Oct 2024 09:00) (106/94 - 146/82)  BP(mean): --  RR: 18 (03 Oct 2024 05:20) (18 - 18)  SpO2: 96% (03 Oct 2024 05:20) (92% - 99%)    Parameters below as of 03 Oct 2024 05:20  Patient On (Oxygen Delivery Method): nasal cannula        I&O's Summary        Physical Exam:   GENERAL: NAD, well-groomed, well-developed  HEENT: SUGEY/   Atraumatic, Normocephalic  ENMT: No tonsillar erythema, exudates, or enlargement; Moist mucous membranes, Good dentition, No lesions  NECK: Supple, No JVD, Normal thyroid  CHEST/LUNG: Clear to auscultaion,- no wheezing  CVS: Regular rate and rhythm; No murmurs, rubs, or gallops  GI: : Soft, Nontender, Nondistended; Bowel sounds present  NERVOUS SYSTEM:  Alert & awake x 0  : irritable and agitated   EXTREMITIES:  -edema  LYMPH: No lymphadenopathy noted  SKIN: No rashes or lesions  ENDOCRINOLOGY: No Thyromegaly  PSYCH: Agitated   Labs:                              13.2   7.98  )-----------( 193      ( 02 Oct 2024 05:55 )             40.8                         13.4   7.84  )-----------( 186      ( 01 Oct 2024 05:44 )             42.7                         13.6   7.15  )-----------( 202      ( 30 Sep 2024 06:20 )             43.2     10-02    141  |  105  |  17  ----------------------------<  98  4.3   |  25  |  0.74  10-01    144  |  108[H]  |  13  ----------------------------<  90  4.0   |  24  |  0.73  09-30    145  |  106  |  15  ----------------------------<  93  4.0   |  25  |  0.86    Ca    8.8      02 Oct 2024 05:55  Phos  3.1     10-02  Mg     1.90     10-02      CAPILLARY BLOOD GLUCOSE              Urinalysis Basic - ( 02 Oct 2024 05:55 )    Color: x / Appearance: x / SG: x / pH: x  Gluc: 98 mg/dL / Ketone: x  / Bili: x / Urobili: x   Blood: x / Protein: x / Nitrite: x   Leuk Esterase: x / RBC: x / WBC x   Sq Epi: x / Non Sq Epi: x / Bacteria: x      rad< from: CT Chest No Cont (09.29.24 @ 13:10) >  right thyroid lobe hypoattenuating nodule (2-39). 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    IMPRESSION:    Extensive emphysematous changes.    Bibasilar predominant peripheral reticular opacities, traction   bronchiectasis, and subpleural cystic changes, compatible with   interstitial lung disease.    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  Consider PET scan or 3 month follow up.    Small left pleural effusion with basilar pleural thickening.    Partially imaged left parotid gland mass. Consider further evaluation   with contrast-enhanced MR neck from more definitive characterization.    < end of copied text >        RECENT CULTURES:        RESPIRATORY CULTURES:          Studies  Chest X-RAY  CT SCAN Chest   Venous Dopplers: LE:   CT Abdomen  Others

## 2024-10-03 NOTE — PROGRESS NOTE ADULT - SUBJECTIVE AND OBJECTIVE BOX
Patient is a 89y old  Male who presents with a chief complaint of acute metabolic encephalopathy (03 Oct 2024 11:37)    Date of servie : 10-03-24 @ 17:14  INTERVAL HPI/OVERNIGHT EVENTS:  T(C): 36.8 (10-03-24 @ 12:00), Max: 36.8 (10-02-24 @ 20:11)  HR: 77 (10-03-24 @ 12:00) (65 - 85)  BP: 128/73 (10-03-24 @ 12:00) (122/65 - 146/82)  RR: 17 (10-03-24 @ 12:00) (17 - 18)  SpO2: 98% (10-03-24 @ 12:00) (92% - 98%)  Wt(kg): --  I&O's Summary      LABS:                        13.2   7.98  )-----------( 193      ( 02 Oct 2024 05:55 )             40.8     10-02    141  |  105  |  17  ----------------------------<  98  4.3   |  25  |  0.74    Ca    8.8      02 Oct 2024 05:55  Phos  3.1     10-02  Mg     1.90     10-02        Urinalysis Basic - ( 02 Oct 2024 05:55 )    Color: x / Appearance: x / SG: x / pH: x  Gluc: 98 mg/dL / Ketone: x  / Bili: x / Urobili: x   Blood: x / Protein: x / Nitrite: x   Leuk Esterase: x / RBC: x / WBC x   Sq Epi: x / Non Sq Epi: x / Bacteria: x      CAPILLARY BLOOD GLUCOSE            Urinalysis Basic - ( 02 Oct 2024 05:55 )    Color: x / Appearance: x / SG: x / pH: x  Gluc: 98 mg/dL / Ketone: x  / Bili: x / Urobili: x   Blood: x / Protein: x / Nitrite: x   Leuk Esterase: x / RBC: x / WBC x   Sq Epi: x / Non Sq Epi: x / Bacteria: x        MEDICATIONS  (STANDING):  albuterol/ipratropium for Nebulization. 3 milliLiter(s) Nebulizer once  atorvastatin 10 milliGRAM(s) Oral at bedtime  buDESOnide    Inhalation Suspension 0.5 milliGRAM(s) Inhalation every 12 hours  clopidogrel Tablet 75 milliGRAM(s) Oral daily  diltiazem    milliGRAM(s) Oral daily  enoxaparin Injectable 40 milliGRAM(s) SubCutaneous every 24 hours  finasteride 5 milliGRAM(s) Oral daily  furosemide    Tablet 40 milliGRAM(s) Oral once  predniSONE   Tablet 10 milliGRAM(s) Oral daily  senna 2 Tablet(s) Oral at bedtime  tamsulosin 0.4 milliGRAM(s) Oral at bedtime    MEDICATIONS  (PRN):  acetaminophen     Tablet .. 650 milliGRAM(s) Oral every 6 hours PRN Temp greater or equal to 38C (100.4F), Mild Pain (1 - 3)  albuterol/ipratropium for Nebulization 3 milliLiter(s) Nebulizer every 6 hours PRN Shortness of Breath and/or Wheezing  aluminum hydroxide/magnesium hydroxide/simethicone Suspension 30 milliLiter(s) Oral every 4 hours PRN Dyspepsia  melatonin 3 milliGRAM(s) Oral at bedtime PRN Insomnia  ondansetron Injectable 4 milliGRAM(s) IV Push every 8 hours PRN Nausea and/or Vomiting          PHYSICAL EXAM:  GENERAL: NAD, well-groomed, well-developed  HEAD:  Atraumatic, Normocephalic  CHEST/LUNG: Clear to percussion bilaterally; No rales, rhonchi, wheezing, or rubs  HEART: Regular rate and rhythm; No murmurs, rubs, or gallops  ABDOMEN: Soft, Nontender, Nondistended; Bowel sounds present  EXTREMITIES:  2+ Peripheral Pulses, No clubbing, cyanosis, or edema  LYMPH: No lymphadenopathy noted  SKIN: No rashes or lesions    Care Discussed with Consultants/Other Providers [ ] YES  [ ] NO

## 2024-10-03 NOTE — PROGRESS NOTE ADULT - SUBJECTIVE AND OBJECTIVE BOX
EP Attending  HISTORY OF PRESENT ILLNESS: HPI:  89M with PMH cognitive dysfunction (AAOx2 at baseline), advanced COPD on daily prednisone with chronic respiratory failure on 4L NC, CAD/MI s/p stent, HLD, and BPH BIBEMS from [facility] for confusion in setting of suspected pneumonia. Pt does not know why he was brought to the hospital, unable to provide much history. Collateral obtained from daughter/HCP Meghan Gonzalez. Pt noted to have intermittent episodes of confusion by NH staff over the past 2 days, occasionally become agitated, saying nonsensical things, all unlike him. Pt is AAOx2 at baseline, would not know the year, but otherwise conversational. Whenever he was visited by daughter or son though, he was found to be at his baseline. He had a UA done that was unremarkable and a CXR that showed "pulmonary vascular congestion with patchy bibasilar infriltrates"- copy in pt's chart. And he was started on doxycycline yesterday, unclear how many doses he took. Pt with a chronic cough, did not notice anything like increased cough or sputum production from baseline. No witnessed episodes of shortness of breath, no increase in baseline oxygen requirement. No known fevers. Meghan believes patient has a history of heart failure and takes lasix regularly, however, heart failure not listed as a diagnosis from NH or [facility] outpatient pulm notes, lasix also not listed on medication list from NH or surescripts.   Pt himself currently feels well. Denies having any trouble breathing, chest pain, abdominal pain, or any other complaints.     In ED:  On arrivalL afebrile, hemodynamically stable, saturating 88% on 4L NC, briefly required 6L but now weaned back down to 4%, saturating >90%.   Labs notable for leukocytosis to 12k, elevated proBNP 3351, VBG with pH 7.37/50, lactate 2.6  UA negative for infection  CXR with reticular opacities over bibasilar lungs on prelim read    Given CTX and azithromycin.   Admitted to medicine for further evaluation.  (25 Sep 2024 22:23)    Date of service 10/3- resting in bed, no new complaints.    acetaminophen     Tablet .. 650 milliGRAM(s) Oral every 6 hours PRN  albuterol/ipratropium for Nebulization 3 milliLiter(s) Nebulizer every 6 hours PRN  albuterol/ipratropium for Nebulization. 3 milliLiter(s) Nebulizer once  aluminum hydroxide/magnesium hydroxide/simethicone Suspension 30 milliLiter(s) Oral every 4 hours PRN  atorvastatin 10 milliGRAM(s) Oral at bedtime  buDESOnide    Inhalation Suspension 0.5 milliGRAM(s) Inhalation every 12 hours  clopidogrel Tablet 75 milliGRAM(s) Oral daily  diltiazem    milliGRAM(s) Oral daily  enoxaparin Injectable 40 milliGRAM(s) SubCutaneous every 24 hours  finasteride 5 milliGRAM(s) Oral daily  furosemide    Tablet 40 milliGRAM(s) Oral once  melatonin 3 milliGRAM(s) Oral at bedtime PRN  ondansetron Injectable 4 milliGRAM(s) IV Push every 8 hours PRN  predniSONE   Tablet 10 milliGRAM(s) Oral daily  senna 2 Tablet(s) Oral at bedtime  tamsulosin 0.4 milliGRAM(s) Oral at bedtime                            13.2   7.98  )-----------( 193      ( 02 Oct 2024 05:55 )             40.8       10-02    141  |  105  |  17  ----------------------------<  98  4.3   |  25  |  0.74    Ca    8.8      02 Oct 2024 05:55  Phos  3.1     10-02  Mg     1.90     10-02    T(C): 36.6 (10-03-24 @ 05:20), Max: 36.8 (10-02-24 @ 12:08)  HR: 76 (10-03-24 @ 09:00) (65 - 85)  BP: 146/58 (10-03-24 @ 09:00) (106/94 - 146/82)  RR: 18 (10-03-24 @ 05:20) (18 - 18)  SpO2: 96% (10-03-24 @ 05:20) (92% - 99%)  Wt(kg): --    I&O's Summary    Appearance: elderly man, sleepy and confused.	  HEENT:   Normal oral mucosa, PERRL, EOMI	  Lymphatic: No lymphadenopathy , no edema  Cardiovascular: Normal S1 S2, No JVD, No murmurs , Peripheral pulses palpable 2+ bilaterally  Respiratory: Lungs clear to auscultation, normal effort 	  Gastrointestinal:  Soft, Non-tender, + BS	  Skin: No rashes, No ecchymoses, No cyanosis, warm to touch  Musculoskeletal: Normal range of motion, normal strength  Psychiatry:  Mood & affect appropriate    TELEMETRY: AFib/AFlutter	    Echo: < from: TTE W or WO Ultrasound Enhancing Agent (09.26.24 @ 15:20) >     1. Left ventricular systolic function is normal with an ejection fraction of 69 % by Alex's method ofdisks.   2. Enlargd right ventricular cavity size and reduced right ventricular systolic function.   3. Left atrium is normal in size.   4. The aortic valve appears trileaflet with reduced systolic excursion. There is moderate aortic stenosis. The peak transaortic velocity is 2.37 m/s, peak transaortic gradient is 22.5 mmHg and mean transaortic gradient is 11.0 mmHg with an LVOT/aortic valve VTI ratio of 0.39. The aortic valve area is estimated at 1.21 cm² by the continuity equation.   5. Estimated pulmonary artery systolic pressure is 57 mmHg, consistent with moderate pulmonary hypertension.   6. No pericardial effusion seen.    < end of copied text >  	  ASSESSMENT/PLAN: Mr Barajas is a 89y Male here from nursing home w/ shortness of breath.  Has COPD, may have superimposed pneumonia.  New dx of atrial arrhythmia, may be causing acute diastolic heart failure.  Continue AV aniket blockade w/ diltiazem.  Heartrate is adequately controlled now.  Backup plan is a Digoxin load.  Awaiting remainder of workup and goals of care before starting oral anticoagulation for stroke prevention.  Will follow with you.    Tarik Marin M.D.  Cardiac Electrophysiology    office [phone]  pager [phone]

## 2024-10-03 NOTE — CHART NOTE - NSCHARTNOTEFT_GEN_A_CORE
Called by RN patient is sating mid 80's on 5L NC. Patient is not in respiratory distress and is currently agitated and yelling at staff. Patient redirectable intermittently. Able to auscultate lung sounds with slight wheezing appreciated. STAT Duoneb ordered, escalated O2 to 6L NC until patient is calm and possibly be down-titrated.   Zyprexa 2.5 mg IM ordered STAT. Will continue to monitor. C/w q 4 vital signs     TAYLOR Callahan  Department of Medicine   In House # [id number]

## 2024-10-04 LAB
ANION GAP SERPL CALC-SCNC: 16 MMOL/L — HIGH (ref 7–14)
BUN SERPL-MCNC: 12 MG/DL — SIGNIFICANT CHANGE UP (ref 7–23)
CALCIUM SERPL-MCNC: 9.5 MG/DL — SIGNIFICANT CHANGE UP (ref 8.4–10.5)
CHLORIDE SERPL-SCNC: 105 MMOL/L — SIGNIFICANT CHANGE UP (ref 98–107)
CO2 SERPL-SCNC: 23 MMOL/L — SIGNIFICANT CHANGE UP (ref 22–31)
CREAT SERPL-MCNC: 0.65 MG/DL — SIGNIFICANT CHANGE UP (ref 0.5–1.3)
EGFR: 87 ML/MIN/1.73M2 — SIGNIFICANT CHANGE UP
GAS PNL BLDV: SIGNIFICANT CHANGE UP
GLUCOSE SERPL-MCNC: 130 MG/DL — HIGH (ref 70–99)
HCT VFR BLD CALC: 48.6 % — SIGNIFICANT CHANGE UP (ref 39–50)
HGB BLD-MCNC: 15.9 G/DL — SIGNIFICANT CHANGE UP (ref 13–17)
MAGNESIUM SERPL-MCNC: 2 MG/DL — SIGNIFICANT CHANGE UP (ref 1.6–2.6)
MCHC RBC-ENTMCNC: 30.3 PG — SIGNIFICANT CHANGE UP (ref 27–34)
MCHC RBC-ENTMCNC: 32.7 GM/DL — SIGNIFICANT CHANGE UP (ref 32–36)
MCV RBC AUTO: 92.7 FL — SIGNIFICANT CHANGE UP (ref 80–100)
NRBC # BLD: 0 /100 WBCS — SIGNIFICANT CHANGE UP (ref 0–0)
NRBC # FLD: 0 K/UL — SIGNIFICANT CHANGE UP (ref 0–0)
PHOSPHATE SERPL-MCNC: 2.5 MG/DL — SIGNIFICANT CHANGE UP (ref 2.5–4.5)
PLATELET # BLD AUTO: 213 K/UL — SIGNIFICANT CHANGE UP (ref 150–400)
POTASSIUM SERPL-MCNC: 4 MMOL/L — SIGNIFICANT CHANGE UP (ref 3.5–5.3)
POTASSIUM SERPL-SCNC: 4 MMOL/L — SIGNIFICANT CHANGE UP (ref 3.5–5.3)
RBC # BLD: 5.24 M/UL — SIGNIFICANT CHANGE UP (ref 4.2–5.8)
RBC # FLD: 15.4 % — HIGH (ref 10.3–14.5)
SODIUM SERPL-SCNC: 144 MMOL/L — SIGNIFICANT CHANGE UP (ref 135–145)
WBC # BLD: 9.45 K/UL — SIGNIFICANT CHANGE UP (ref 3.8–10.5)
WBC # FLD AUTO: 9.45 K/UL — SIGNIFICANT CHANGE UP (ref 3.8–10.5)

## 2024-10-04 PROCEDURE — 90792 PSYCH DIAG EVAL W/MED SRVCS: CPT

## 2024-10-04 RX ADMIN — Medication 0.5 MILLIGRAM(S): at 10:10

## 2024-10-04 RX ADMIN — Medication 75 MILLIGRAM(S): at 11:37

## 2024-10-04 RX ADMIN — ENOXAPARIN SODIUM 40 MILLIGRAM(S): 150 INJECTION SUBCUTANEOUS at 18:00

## 2024-10-04 RX ADMIN — FINASTERIDE 5 MILLIGRAM(S): 5 TABLET, FILM COATED ORAL at 11:36

## 2024-10-04 RX ADMIN — Medication 0.5 MILLIGRAM(S): at 21:18

## 2024-10-04 NOTE — PROGRESS NOTE ADULT - SUBJECTIVE AND OBJECTIVE BOX
DATE OF SERVICE: 10-04-24    Overnight agitated,     MEDICATIONS:  acetaminophen     Tablet .. 650 milliGRAM(s) Oral every 6 hours PRN  albuterol/ipratropium for Nebulization 3 milliLiter(s) Nebulizer every 6 hours PRN  albuterol/ipratropium for Nebulization. 3 milliLiter(s) Nebulizer once  aluminum hydroxide/magnesium hydroxide/simethicone Suspension 30 milliLiter(s) Oral every 4 hours PRN  atorvastatin 10 milliGRAM(s) Oral at bedtime  buDESOnide    Inhalation Suspension 0.5 milliGRAM(s) Inhalation every 12 hours  clopidogrel Tablet 75 milliGRAM(s) Oral daily  diltiazem    milliGRAM(s) Oral daily  enoxaparin Injectable 40 milliGRAM(s) SubCutaneous every 24 hours  finasteride 5 milliGRAM(s) Oral daily  furosemide    Tablet 40 milliGRAM(s) Oral once  melatonin 3 milliGRAM(s) Oral at bedtime PRN  ondansetron Injectable 4 milliGRAM(s) IV Push every 8 hours PRN  predniSONE   Tablet 10 milliGRAM(s) Oral daily  senna 2 Tablet(s) Oral at bedtime  tamsulosin 0.4 milliGRAM(s) Oral at bedtime      LABS:                        15.9   9.45  )-----------( 213      ( 04 Oct 2024 06:10 )             48.6       Hemoglobin: 15.9 g/dL (10-04 @ 06:10)  Hemoglobin: 13.2 g/dL (10-02 @ 05:55)  Hemoglobin: 13.4 g/dL (10-01 @ 05:44)  Hemoglobin: 13.6 g/dL (09-30 @ 06:20)      10-04    144  |  105  |  12  ----------------------------<  130[H]  4.0   |  23  |  0.65    Ca    9.5      04 Oct 2024 06:10  Phos  2.5     10-04  Mg     2.00     10-04      Creatinine Trend: 0.65<--, 0.74<--, 0.73<--, 0.86<--, 0.78<--, 0.88<--    COAGS:           PHYSICAL EXAM:  T(C): 37 (10-04-24 @ 06:00), Max: 37.1 (10-03-24 @ 20:55)  HR: 83 (10-04-24 @ 06:00) (74 - 83)  BP: 150/83 (10-04-24 @ 06:00) (128/73 - 150/83)  RR: 18 (10-04-24 @ 06:00) (17 - 18)  SpO2: 94% (10-04-24 @ 06:00) (94% - 98%)  Wt(kg): --    I&O's Summary      General: NAD  Head: Normocephalic and atraumatic.   Neck: No JVD. No bruits. Supple. Does not appear to be enlarged.   Cardiovascular: + S1,S2 ; RRR Soft systolic murmur at the left lower sternal border. No rubs noted.    Lungs: CTA b/l. No rhonchi, rales or wheezes.   Abdomen: + BS, soft. Non tender. Non distended. No rebound. No guarding.   Extremities: No clubbing/cyanosis/edema.   Neurologic: Moves all four extremities. Full range of motion.   Skin: Warm and moist. The patient's skin has normal elasticity and good skin turgor.   Psychiatric: cannot eval  Musculoskeletal: Normal range of motion, normal strength    DATA    tele- NSR 50s      ECG:  	AF 140s    < from: TTE W or WO Ultrasound Enhancing Agent (09.26.24 @ 15:20) >     CONCLUSIONS:      1. Left ventricular systolic function is normal with an ejection fraction of 69 % by Alex's method ofdisks.   2. Enlarged right ventricular cavity size and reduced right ventricular systolic function.   3. Left atrium is normal in size.   4. The aortic valve appears trileaflet with reduced systolic excursion. There is moderate aortic stenosis. The peak transaortic velocity is 2.37 m/s, peak transaortic gradient is 22.5 mmHg and mean transaortic gradient is 11.0 mmHg with an LVOT/aortic valve VTI ratio of 0.39. The aortic valve area is estimated at 1.21 cm² by the continuity equation.   5. Estimated pulmonary artery systolic pressure is 57 mmHg, consistent with moderate pulmonary hypertension.   6. No pericardial effusion seen.  < end of copied text >      ASSESSMENT/PLAN: 	  89M with PMH cognitive dysfunction (AAOx2 at baseline), advanced COPD on daily prednisone with chronic respiratory failure on 4L NC, CAD/MI s/p stent 15 years ago, HLD, and BPH BIBEMS from [facility] for confusion in setting of suspected pneumonia. Cardiology consulted for New onset Rapid Afib /?flutter today    --TTE noted with Normal LVEF, mod AS, mod Pulm HTN  --remains in SR on cardizem CD 120mg daily  --Despite [id number] of atleast 3, given AMS/agitation/lung mass, hold off on full Ac for now  --Ct head/chest noted--family does not want malignancy w/u, spoke with daughter states that he is fairly bedbound over last couple months and needs help with ADLs also reports that since his mental status has been waxing and waning would prefer to hold of AC for now and its bleeding risk  --Blood cultures negative to date    Hyun Cooper MD  Pager: [phone]

## 2024-10-04 NOTE — PROGRESS NOTE ADULT - SUBJECTIVE AND OBJECTIVE BOX
EP Attending  HISTORY OF PRESENT ILLNESS: HPI:  89M with PMH cognitive dysfunction (AAOx2 at baseline), advanced COPD on daily prednisone with chronic respiratory failure on 4L NC, CAD/MI s/p stent, HLD, and BPH BIBEMS from [facility] for confusion in setting of suspected pneumonia. Pt does not know why he was brought to the hospital, unable to provide much history. Collateral obtained from daughter/HCP Meghan Gonzalez. Pt noted to have intermittent episodes of confusion by NH staff over the past 2 days, occasionally become agitated, saying nonsensical things, all unlike him. Pt is AAOx2 at baseline, would not know the year, but otherwise conversational. Whenever he was visited by daughter or son though, he was found to be at his baseline. He had a UA done that was unremarkable and a CXR that showed "pulmonary vascular congestion with patchy bibasilar infriltrates"- copy in pt's chart. And he was started on doxycycline yesterday, unclear how many doses he took. Pt with a chronic cough, did not notice anything like increased cough or sputum production from baseline. No witnessed episodes of shortness of breath, no increase in baseline oxygen requirement. No known fevers. Meghan believes patient has a history of heart failure and takes lasix regularly, however, heart failure not listed as a diagnosis from NH or [facility] outpatient pulm notes, lasix also not listed on medication list from NH or surescripts.   Pt himself currently feels well. Denies having any trouble breathing, chest pain, abdominal pain, or any other complaints.     In ED:  On arrivalL afebrile, hemodynamically stable, saturating 88% on 4L NC, briefly required 6L but now weaned back down to 4%, saturating >90%.   Labs notable for leukocytosis to 12k, elevated proBNP 3351, VBG with pH 7.37/50, lactate 2.6  UA negative for infection  CXR with reticular opacities over bibasilar lungs on prelim read    Given CTX and azithromycin.   Admitted to medicine for further evaluation.  (25 Sep 2024 22:23)    10/3- resting in bed, no new complaints.  Date of service 10/4- resting in bed, no angina or palpitations.    acetaminophen     Tablet .. 650 milliGRAM(s) Oral every 6 hours PRN  albuterol/ipratropium for Nebulization 3 milliLiter(s) Nebulizer every 6 hours PRN  albuterol/ipratropium for Nebulization. 3 milliLiter(s) Nebulizer once  aluminum hydroxide/magnesium hydroxide/simethicone Suspension 30 milliLiter(s) Oral every 4 hours PRN  atorvastatin 10 milliGRAM(s) Oral at bedtime  buDESOnide    Inhalation Suspension 0.5 milliGRAM(s) Inhalation every 12 hours  clopidogrel Tablet 75 milliGRAM(s) Oral daily  diltiazem    milliGRAM(s) Oral daily  enoxaparin Injectable 40 milliGRAM(s) SubCutaneous every 24 hours  finasteride 5 milliGRAM(s) Oral daily  furosemide    Tablet 40 milliGRAM(s) Oral once  melatonin 3 milliGRAM(s) Oral at bedtime PRN  ondansetron Injectable 4 milliGRAM(s) IV Push every 8 hours PRN  predniSONE   Tablet 10 milliGRAM(s) Oral daily  senna 2 Tablet(s) Oral at bedtime  tamsulosin 0.4 milliGRAM(s) Oral at bedtime                         15.9   9.45  )-----------( 213      ( 04 Oct 2024 06:10 )             48.6       10-04    144  |  105  |  12  ----------------------------<  130[H]  4.0   |  23  |  0.65    Ca    9.5      04 Oct 2024 06:10  Phos  2.5     10-04  Mg     2.00     10-04    T(C): 37 (10-04-24 @ 06:00), Max: 37.1 (10-03-24 @ 20:55)  HR: 83 (10-04-24 @ 06:00) (74 - 83)  BP: 150/83 (10-04-24 @ 06:00) (128/73 - 150/83)  RR: 18 (10-04-24 @ 06:00) (17 - 18)  SpO2: 94% (10-04-24 @ 06:00) (94% - 98%)  Wt(kg): --    I&O's Summary    Appearance: elderly man, sleepy and confused.	  HEENT:   Normal oral mucosa, PERRL, EOMI	  Lymphatic: No lymphadenopathy , no edema  Cardiovascular: Normal S1 S2, No JVD, No murmurs , Peripheral pulses palpable 2+ bilaterally  Respiratory: Lungs clear to auscultation, normal effort 	  Gastrointestinal:  Soft, Non-tender, + BS	  Skin: No rashes, No ecchymoses, No cyanosis, warm to touch  Musculoskeletal: Normal range of motion, normal strength  Psychiatry:  Mood & affect appropriate    TELEMETRY: AFib/AFlutter	    Echo: < from: TTE W or WO Ultrasound Enhancing Agent (09.26.24 @ 15:20) >     1. Left ventricular systolic function is normal with an ejection fraction of 69 % by Alex's method ofdisks.   2. Enlargd right ventricular cavity size and reduced right ventricular systolic function.   3. Left atrium is normal in size.   4. The aortic valve appears trileaflet with reduced systolic excursion. There is moderate aortic stenosis. The peak transaortic velocity is 2.37 m/s, peak transaortic gradient is 22.5 mmHg and mean transaortic gradient is 11.0 mmHg with an LVOT/aortic valve VTI ratio of 0.39. The aortic valve area is estimated at 1.21 cm² by the continuity equation.   5. Estimated pulmonary artery systolic pressure is 57 mmHg, consistent with moderate pulmonary hypertension.   6. No pericardial effusion seen.    < end of copied text >  	  ASSESSMENT/PLAN: Mr Barajas is a 89y Male here from nursing home w/ shortness of breath.  Has COPD, may have superimposed pneumonia.  New dx of atrial arrhythmia, may be causing acute diastolic heart failure.  Continue AV aniket blockade w/ diltiazem.  Heartrate is adequately controlled now.  Backup plan is a Digoxin load.  Awaiting remainder of workup and goals of care before starting oral anticoagulation for stroke prevention.  Will follow with you.    Tarik Marin M.D.  Cardiac Electrophysiology    office [phone]  pager [phone]

## 2024-10-04 NOTE — PROGRESS NOTE ADULT - ASSESSMENT
89M with PMH cognitive dysfunction (AAOx2 at baseline), advanced COPD on daily prednisone with chronic respiratory failure on 4L NC, CAD/MI s/p stent, HLD, and BPH BIBEMS from [facility] for confusion in setting of suspected pneumonia. Pt does not know why he was brought to the hospital, unable to provide much history. Collateral obtained from daughter/HCP Meghan Gonzalez. Pt noted to have intermittent episodes of confusion by NH staff over the past 2 days, occasionally become agitated, saying nonsensical things, all unlike him. Pt is AAOx2 at baseline, would not know the year, but otherwise conversational. Whenever he was visited by daughter or son though, he was found to be at his baseline. He had a UA done that was unremarkable and a CXR that showed "pulmonary vascular congestion with patchy bibasilar infriltrates"- copy in pt's chart. And he was started on doxycycline yesterday, unclear how many doses he took. Pt with a chronic cough, did not notice anything like increased cough or sputum production from baseline. No witnessed episodes of shortness of breath, no increase in baseline oxygen requirement. No known fevers. Meghan believes patient has a history of heart failure and takes lasix regularly, however, heart failure not listed as a diagnosis from NH or [facility] outpatient pulm notes, lasix also not listed on medication list from NH or surescripts.   Pt himself currently feels well. Denies having any trouble breathing, chest pain, abdominal pain, or any other complaints.     Acute metabolic encephalopathy.   -check repeat CT head -NOT DONE YET;  WAS CANCELLED   - could be related to infection?  pneumonia,  uti etc:  being rule dout  - his VBG is fine with no retention of co2:  so thats not the reason of him getting more confused;     9/29;  -he is still confused?  baseline not sure;   -he is not wheezing;  awaiting ct chest   -heis still on 5 L:  try to bring down ; sao2 gaol is 88%; ex smoker     9/30: he is still  pretty confused;    -on chr prednisone for copd;   he is not wheezing    10/1; seems to be doing  ok ;on 4 L of oxygen :  10/2: on 4 L of oxygen ; try to minimise o x as tolerated  10/3:  called daughter : she is aware of him having malignancy probably:   -on prednisone  -daughter does not to work up for malignancy  :  10/4:  -he remains OK;  les acitated today  ;  alert and awke:   no resp distress:  no wheezing:  for malignancy as above     Mild leukocytosis to 12k on admission  - infectious vs reactive. afebrile, not septic, hemodynamically stable.   -Without any new fevers,   -URI sxs, worsening of chronic cough, or increased O2 requirements  - Procal negative.  - cw abx as per ID  ; to cont for now;   -needs ct chest     9/29; -on no antibiotics   -afebrile today  ;  wbc is normal today     9/30: remains afebrile and wbc is normal   10/1: seems OK:   10/2: cont current care:   10/4:  leucocytosis resolved;     Advanced COPD.   -ow suspicion for COPD exacerbation, lung exam with localized rhonchi/coarse crackles over R>L base. No increased baseline O2 requirement or worsening of chronic cough  - continue home prednisone 10mg daily   - duonebs PRN.  -and his VBg also did not show any  significant retention ofpco2:     9/30:  -cont current rx:  he seems stable:   -ct chest showed; Extensive emphysematous changes. Bibasilar predominant peripheral reticular opacities, traction bronchiectasis, and subpleural cystic changes, compatible with   interstitial lung disease. Right lung pulmonary nodules, measuring up to 1.8 cm, concerning for neoplasia.  Consider PET scan or 3 month follow up. Small left pleural effusion with basilar pleural thickening. Partially imaged left parotid gland mass. Consider further evaluation with contrast-enhanced MR neck from more definitive characterization.    10/1:  dw daughter : she will decide about the pulm nodule:  and has parotid gland tumor ; work up per primary team     10/3: no workup per daughter   10/4:  pulm eise he seems stable:  no sob:  no wheezing     Chronic hypoxic respiratory failure.   ·  Plan: - continue home NC at 4L NC, goal 88-92&-likely due to copd   10/2; seems to be doing  ok :   10/03: cont 4 L of NC :  he isnot wheezing:  vbg ordered   10/04;-VBG is very good:     CAD (coronary artery disease).    - continue home statin, plavix.  -NEW aflutter   -echo showed;  1. Left ventricular systolic function is normal with an ejection fraction of 69 % by Alex's method of disks.   2. Enlarged right ventricular cavity size and reduced right ventricular systolic function.   3. Left atrium is normal in size.   4. The aortic valve appears trileaflet with reduced systolic excursion. There is moderate aortic stenosis. The peak transaortic velocity is 2.37 m/s, peak transaortic gradient is 22.5 mmHg and mean transaortic gradient is 11.0 mmHg with an LVOT/aortic valve VTI ratio of 0.39. The aortic valve area is estimated at 1.21 cm² by the continuity equation.   5. Estimated pulmonary artery systolic pressure is 57 mmHg, consistent with moderate pulmonary hypertension.  defer to cards         dw acp

## 2024-10-04 NOTE — BH CONSULTATION LIAISON ASSESSMENT NOTE - NSBHCHARTREVIEWLAB_PSY_A_CORE FT
15.9   9.45  )-----------( 213      ( 04 Oct 2024 06:10 )             48.6     10-04    144  |  105  |  12  ----------------------------<  130[H]  4.0   |  23  |  0.65    Ca    9.5      04 Oct 2024 06:10  Phos  2.5     10-04  Mg     2.00     10-04      Urinalysis Basic - ( 04 Oct 2024 06:10 )    Color: x / Appearance: x / SG: x / pH: x  Gluc: 130 mg/dL / Ketone: x  / Bili: x / Urobili: x   Blood: x / Protein: x / Nitrite: x   Leuk Esterase: x / RBC: x / WBC x   Sq Epi: x / Non Sq Epi: x / Bacteria: x

## 2024-10-04 NOTE — BH CONSULTATION LIAISON ASSESSMENT NOTE - HPI (INCLUDE ILLNESS QUALITY, SEVERITY, DURATION, TIMING, CONTEXT, MODIFYING FACTORS, ASSOCIATED SIGNS AND SYMPTOMS)
89M with PMH cognitive dysfunction (AAOx2 at baseline), advanced COPD on daily prednisone with chronic respiratory failure on 4L NC, CAD/MI s/p stent, HLD, and BPH BIBEMS from [facility] for confusion x 2 days. Chest imaging suggestive of pulmonary edema vs pneumonia.  Psych c/s for agitation.     Per records patient AOx2 at baseline. Per staff patient worse today, and had been also confused a few days ago.     Eval with  #[id number] - patient mostly speaking nonsensically, Aox1 at most, [place] in [facility]. Thinks it is year 3, unable say month, asking for his scooter for unclear reasons. Denies suicidality/intent or plan and denies homicidality/intent or plan or AH/VH but unclear if understanding question given level of disorganization. +utilization behaviors, unable to follow 1 step commands. Mood is "okay".

## 2024-10-04 NOTE — BH CONSULTATION LIAISON ASSESSMENT NOTE - NSBHCONSULTFOLLOWAFTERCARE_PSY_A_CORE FT
[facility]  [address] [place] [address], [facility], First Floor, [place] [address]  [phone]  [email or web address]: [phone]

## 2024-10-04 NOTE — PROGRESS NOTE ADULT - SUBJECTIVE AND OBJECTIVE BOX
Date of Service: 10-04-24 @ 12:38    Patient is a 89y old  Male who presents with a chief complaint of acute metabolic encephalopathy (04 Oct 2024 11:21)      Any change in ROS: seems to be doing  ok ; much less agitated:     MEDICATIONS  (STANDING):  albuterol/ipratropium for Nebulization. 3 milliLiter(s) Nebulizer once  atorvastatin 10 milliGRAM(s) Oral at bedtime  buDESOnide    Inhalation Suspension 0.5 milliGRAM(s) Inhalation every 12 hours  clopidogrel Tablet 75 milliGRAM(s) Oral daily  diltiazem    milliGRAM(s) Oral daily  enoxaparin Injectable 40 milliGRAM(s) SubCutaneous every 24 hours  finasteride 5 milliGRAM(s) Oral daily  furosemide    Tablet 40 milliGRAM(s) Oral once  predniSONE   Tablet 10 milliGRAM(s) Oral daily  senna 2 Tablet(s) Oral at bedtime  tamsulosin 0.4 milliGRAM(s) Oral at bedtime    MEDICATIONS  (PRN):  acetaminophen     Tablet .. 650 milliGRAM(s) Oral every 6 hours PRN Temp greater or equal to 38C (100.4F), Mild Pain (1 - 3)  albuterol/ipratropium for Nebulization 3 milliLiter(s) Nebulizer every 6 hours PRN Shortness of Breath and/or Wheezing  aluminum hydroxide/magnesium hydroxide/simethicone Suspension 30 milliLiter(s) Oral every 4 hours PRN Dyspepsia  melatonin 3 milliGRAM(s) Oral at bedtime PRN Insomnia  ondansetron Injectable 4 milliGRAM(s) IV Push every 8 hours PRN Nausea and/or Vomiting    Vital Signs Last 24 Hrs  T(C): 36.5 (04 Oct 2024 12:01), Max: 37.1 (03 Oct 2024 20:55)  T(F): 97.7 (04 Oct 2024 12:01), Max: 98.7 (03 Oct 2024 20:55)  HR: 88 (04 Oct 2024 12:01) (74 - 88)  BP: 137/70 (04 Oct 2024 12:01) (137/70 - 150/83)  BP(mean): --  RR: 17 (04 Oct 2024 12:01) (17 - 18)  SpO2: 95% (04 Oct 2024 12:01) (94% - 96%)    Parameters below as of 04 Oct 2024 08:24  Patient On (Oxygen Delivery Method): nasal cannula        I&O's Summary        Physical Exam:   GENERAL: NAD, well-groomed, well-developed  HEENT: SUGEY/   Atraumatic, Normocephalic  ENMT: No tonsillar erythema, exudates, or enlargement; Moist mucous membranes, Good dentition, No lesions  NECK: Supple, No JVD, Normal thyroid  CHEST/LUNG: Clear to auscultaion- no wheezing  CVS: Regular rate and rhythm; No murmurs, rubs, or gallops  GI: : Soft, Nontender, Nondistended; Bowel sounds present  NERVOUS SYSTEM:  Alert & Oriented X1  EXTREMITIES: -edema  LYMPH: No lymphadenopathy noted  SKIN: No rashes or lesions  ENDOCRINOLOGY: No Thyromegaly  PSYCH: confused     Labs:  27                            15.9   9.45  )-----------( 213      ( 04 Oct 2024 06:10 )             48.6                         13.2   7.98  )-----------( 193      ( 02 Oct 2024 05:55 )             40.8                         13.4   7.84  )-----------( 186      ( 01 Oct 2024 05:44 )             42.7     10-04    144  |  105  |  12  ----------------------------<  130[H]  4.0   |  23  |  0.65  10-02    141  |  105  |  17  ----------------------------<  98  4.3   |  25  |  0.74  10-01    144  |  108[H]  |  13  ----------------------------<  90  4.0   |  24  |  0.73    Ca    9.5      04 Oct 2024 06:10  Phos  2.5     10-04  Mg     2.00     10-04      CAPILLARY BLOOD GLUCOSE              Urinalysis Basic - ( 04 Oct 2024 06:10 )    Color: x / Appearance: x / SG: x / pH: x  Gluc: 130 mg/dL / Ketone: x  / Bili: x / Urobili: x   Blood: x / Protein: x / Nitrite: x   Leuk Esterase: x / RBC: x / WBC x   Sq Epi: x / Non Sq Epi: x / Bacteria: x      < from: CT Chest No Cont (09.29.24 @ 13:10) >  right thyroid lobe hypoattenuating nodule (2-39). 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    IMPRESSION:    Extensive emphysematous changes.    Bibasilar predominant peripheral reticular opacities, traction   bronchiectasis, and subpleural cystic changes, compatible with   interstitial lung disease.    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  Consider PET scan or 3 month follow up.    Small left pleural effusion with basilar pleural thickening.    Partially imaged left parotid gland mass. Consider further evaluation   with contrast-enhanced MR neck from more definitive characterization.    --- End of Report ---    < end of copied text >        RECENT CULTURES:        RESPIRATORY CULTURES:          Studies  Chest X-RAY  CT SCAN Chest   Venous Dopplers: LE:   CT Abdomen  Others

## 2024-10-04 NOTE — PROGRESS NOTE ADULT - ASSESSMENT
89M with PMH cognitive dysfunction (AAOx2 at baseline), advanced COPD on daily prednisone with chronic respiratory failure on 4L NC, CAD/MI s/p stent, HLD, and BPH BIBEMS from [facility] for confusion x 2 days. Chest imaging suggestive of pulmonary edema vs pneumonia.         Problem/Plan - 1:  ·  Problem: Acute metabolic encephalopathy.   ·  Plan:   - without any focal deficits to suggest structural etiology- treatment for infection vs edema as below   - frequent reorientation- maintain normal sleep/wake cycles (avoid waking patient between 11PM and 6AM unless medically necessary).    Problem/Plan - 2:·  Problem: Leukocytosis.   ·  Plan: mild leukocytosis to 12k on admission, infectious vs reactive. afebrile, not septic, hemodynamically stable. Without any new fevers, URI sxs, worsening of chronic cough, or increased O2 requirements. Procal negative. Favoring pulmonary edema over pneumonia at this time at this time   - ID fu     Problem/Plan - 3:·  Problem: Advanced COPD.   ·  Plan: low suspicion for COPD exacerbation, lung exam with localized rhonchi/coarse crackles over R>L base. No increased baseline O2 requirement or worsening of chronic cough  - continue home prednisone 10mg daily   - duonebs PRN.  Problem/Plan - 4:  ·  Problem: lung nodule   ·  Plan: - CT reviewed  - Family does not want baird or treatment   - family wants comfort measures only     Problem/Plan - 5:  ·  Problem: CAD (coronary artery disease).   ·  Plan: - continue home statin, plavix.    Problem/Plan - 6:  ·  Problem: BPH (benign prostatic hyperplasia).   ·  Plan: - continue home finasteride and tamsulosin.    Problem/Plan - 7:  ·  Problem: nEW aflutter   ·  Plan: cw tele  - sp BB  - cards fu

## 2024-10-04 NOTE — BH CONSULTATION LIAISON ASSESSMENT NOTE - NSBHCHARTREVIEWINVESTIGATE_PSY_A_CORE FT
PHQ-2:    Little interest or pleasure in doing things  0 [ x]  1 [ ]  2 [ ]  3 [ ]  Feeling down, depressed, or hopeless  0 [x ]  1 [ ]  2 [ ]  3 [ ]  Total:    DB - 2:    Day of the Week: Correct [ ] Incorrect [x ]  Months of the Year Backwards: Correct [ ] Incorrect [x ]    Mini Cog:    3 Word Recall: none [x ] 1 [ ] 2 [ ] 3 [ ]  Clock Drawing: Correct (2 points) [ ] Partial (1 point) [ x] None ( 0 points) [ ]   Total:  Unable to Assess [ ]

## 2024-10-04 NOTE — PROGRESS NOTE ADULT - SUBJECTIVE AND OBJECTIVE BOX
Patient is a 89y old  Male who presents with a chief complaint of acute metabolic encephalopathy (04 Oct 2024 12:38)    Date of servie : 10-04-24 @ 15:17  INTERVAL HPI/OVERNIGHT EVENTS:  T(C): 36.5 (10-04-24 @ 12:01), Max: 37.1 (10-03-24 @ 20:55)  HR: 88 (10-04-24 @ 12:01) (74 - 88)  BP: 137/70 (10-04-24 @ 12:01) (137/70 - 150/83)  RR: 17 (10-04-24 @ 12:01) (17 - 18)  SpO2: 95% (10-04-24 @ 12:01) (94% - 96%)  Wt(kg): --  I&O's Summary      LABS:                        15.9   9.45  )-----------( 213      ( 04 Oct 2024 06:10 )             48.6     10-04    144  |  105  |  12  ----------------------------<  130[H]  4.0   |  23  |  0.65    Ca    9.5      04 Oct 2024 06:10  Phos  2.5     10-04  Mg     2.00     10-04        Urinalysis Basic - ( 04 Oct 2024 06:10 )    Color: x / Appearance: x / SG: x / pH: x  Gluc: 130 mg/dL / Ketone: x  / Bili: x / Urobili: x   Blood: x / Protein: x / Nitrite: x   Leuk Esterase: x / RBC: x / WBC x   Sq Epi: x / Non Sq Epi: x / Bacteria: x      CAPILLARY BLOOD GLUCOSE            Urinalysis Basic - ( 04 Oct 2024 06:10 )    Color: x / Appearance: x / SG: x / pH: x  Gluc: 130 mg/dL / Ketone: x  / Bili: x / Urobili: x   Blood: x / Protein: x / Nitrite: x   Leuk Esterase: x / RBC: x / WBC x   Sq Epi: x / Non Sq Epi: x / Bacteria: x        MEDICATIONS  (STANDING):  albuterol/ipratropium for Nebulization. 3 milliLiter(s) Nebulizer once  atorvastatin 10 milliGRAM(s) Oral at bedtime  buDESOnide    Inhalation Suspension 0.5 milliGRAM(s) Inhalation every 12 hours  clopidogrel Tablet 75 milliGRAM(s) Oral daily  diltiazem    milliGRAM(s) Oral daily  enoxaparin Injectable 40 milliGRAM(s) SubCutaneous every 24 hours  finasteride 5 milliGRAM(s) Oral daily  furosemide    Tablet 40 milliGRAM(s) Oral once  predniSONE   Tablet 10 milliGRAM(s) Oral daily  senna 2 Tablet(s) Oral at bedtime  tamsulosin 0.4 milliGRAM(s) Oral at bedtime    MEDICATIONS  (PRN):  acetaminophen     Tablet .. 650 milliGRAM(s) Oral every 6 hours PRN Temp greater or equal to 38C (100.4F), Mild Pain (1 - 3)  albuterol/ipratropium for Nebulization 3 milliLiter(s) Nebulizer every 6 hours PRN Shortness of Breath and/or Wheezing  aluminum hydroxide/magnesium hydroxide/simethicone Suspension 30 milliLiter(s) Oral every 4 hours PRN Dyspepsia  melatonin 3 milliGRAM(s) Oral at bedtime PRN Insomnia  ondansetron Injectable 4 milliGRAM(s) IV Push every 8 hours PRN Nausea and/or Vomiting          PHYSICAL EXAM:  GENERAL: NAD, well-groomed, well-developed  HEAD:  Atraumatic, Normocephalic  CHEST/LUNG: Clear to percussion bilaterally; No rales, rhonchi, wheezing, or rubs  HEART: Regular rate and rhythm; No murmurs, rubs, or gallops  ABDOMEN: Soft, Nontender, Nondistended; Bowel sounds present  EXTREMITIES:  2+ Peripheral Pulses, No clubbing, cyanosis, or edema  LYMPH: No lymphadenopathy noted  SKIN: No rashes or lesions    Care Discussed with Consultants/Other Providers [ ] YES  [ ] NO

## 2024-10-04 NOTE — BH CONSULTATION LIAISON ASSESSMENT NOTE - SUMMARY
89M with PMH cognitive dysfunction (AAOx2 at baseline), advanced COPD on daily prednisone with chronic respiratory failure on 4L NC, CAD/MI s/p stent, HLD, and BPH BIBEMS from [facility] for confusion x 2 days. Chest imaging suggestive of pulmonary edema vs pneumonia.  Psych c/s for agitation.    Patient intermittently agitated and confused, waxing/waning. Would avoid standing antipsychotics if sporadic, PRNs as below. Defer to primary re: further elucidation of GOC, if antipsychotics (with 1-2% mortality risk) align with GOC then can consider if agitation persists. No psych c/i to DC back to NH, no role for inpt psych. Ramelteon with minimal sfx profile may be better fit for sleep.     Recs:  - defer obs to primary team  - Melatonin 6mg qHS, If ramelteon available would switch to Ramelteon 8mg qHS instead  - PRN Zyprexa 1.25mg q6h PRN PO/IM  - Avoid benzos, anticholinergics  - Would clarify if patient still needs steroids in hospital (chronic)  - no psych c/i to discharge to NH if no longer agitated for >24h  - would repeat delirium workup

## 2024-10-04 NOTE — BH CONSULTATION LIAISON ASSESSMENT NOTE - NSBHCHARTREVIEWVS_PSY_A_CORE FT
Vital Signs Last 24 Hrs  T(C): 37 (04 Oct 2024 06:00), Max: 37.1 (03 Oct 2024 20:55)  T(F): 98.6 (04 Oct 2024 06:00), Max: 98.7 (03 Oct 2024 20:55)  HR: 83 (04 Oct 2024 06:00) (74 - 83)  BP: 150/83 (04 Oct 2024 06:00) (128/73 - 150/83)  BP(mean): --  RR: 18 (04 Oct 2024 06:00) (17 - 18)  SpO2: 94% (04 Oct 2024 06:00) (94% - 98%)    Parameters below as of 04 Oct 2024 08:24  Patient On (Oxygen Delivery Method): nasal cannula

## 2024-10-04 NOTE — BH CONSULTATION LIAISON ASSESSMENT NOTE - MSE UNSTRUCTURED FT
Mental Status Exam:  Nathalia: well groomed, fair hygiene     Behavior: psychomotor agitation  Motor: no tremors, EPS, or rigidity, +utilization behaviors  Gait: did not assess, pt in bed  Speech: mumbling, soft  Mood: "okay"  Affect: euthymic, congruent  Thought process: confused  Thought Content: denies paranoia, delusions   Perception: denies AH/VH  SI: denies  HI: denies  Insight: poor  Judgment: poor    Cognitive Exam:  Orientation: AOx1

## 2024-10-05 RX ADMIN — Medication 75 MILLIGRAM(S): at 12:52

## 2024-10-05 RX ADMIN — ATORVASTATIN CALCIUM 10 MILLIGRAM(S): 10 TABLET, FILM COATED ORAL at 22:04

## 2024-10-05 RX ADMIN — Medication 2 TABLET(S): at 22:04

## 2024-10-05 RX ADMIN — Medication 3 MILLIGRAM(S): at 22:05

## 2024-10-05 RX ADMIN — Medication 0.5 MILLIGRAM(S): at 07:19

## 2024-10-05 RX ADMIN — ENOXAPARIN SODIUM 40 MILLIGRAM(S): 150 INJECTION SUBCUTANEOUS at 12:52

## 2024-10-05 RX ADMIN — Medication 0.5 MILLIGRAM(S): at 22:14

## 2024-10-05 RX ADMIN — FINASTERIDE 5 MILLIGRAM(S): 5 TABLET, FILM COATED ORAL at 12:52

## 2024-10-05 RX ADMIN — Medication 0.4 MILLIGRAM(S): at 22:04

## 2024-10-05 RX ADMIN — PREDNISONE 10 MILLIGRAM(S): 5 TABLET ORAL at 05:27

## 2024-10-05 RX ADMIN — Medication 120 MILLIGRAM(S): at 05:28

## 2024-10-05 NOTE — PROGRESS NOTE ADULT - SUBJECTIVE AND OBJECTIVE BOX
EP     HISTORY OF PRESENT ILLNESS: HPI:  89M with PMH cognitive dysfunction (AAOx2 at baseline), advanced COPD on daily prednisone with chronic respiratory failure on 4L NC, CAD/MI s/p stent, HLD, and BPH BIBEMS from [facility] for confusion in setting of suspected pneumonia. Pt does not know why he was brought to the hospital, unable to provide much history. Collateral obtained from daughter/HCP Meghan Gonzalez. Pt noted to have intermittent episodes of confusion by NH staff over the past 2 days, occasionally become agitated, saying nonsensical things, all unlike him. Pt is AAOx2 at baseline, would not know the year, but otherwise conversational. Whenever he was visited by daughter or son though, he was found to be at his baseline. He had a UA done that was unremarkable and a CXR that showed "pulmonary vascular congestion with patchy bibasilar infriltrates"- copy in pt's chart. And he was started on doxycycline yesterday, unclear how many doses he took. Pt with a chronic cough, did not notice anything like increased cough or sputum production from baseline. No witnessed episodes of shortness of breath, no increase in baseline oxygen requirement. No known fevers. Meghan believes patient has a history of heart failure and takes lasix regularly, however, heart failure not listed as a diagnosis from NH or [facility] outpatient pulm notes, lasix also not listed on medication list from NH or surescripts.   Pt himself currently feels well. Denies having any trouble breathing, chest pain, abdominal pain, or any other complaints.     In ED:  On arrivalL afebrile, hemodynamically stable, saturating 88% on 4L NC, briefly required 6L but now weaned back down to 4%, saturating >90%.   Labs notable for leukocytosis to 12k, elevated proBNP 3351, VBG with pH 7.37/50, lactate 2.6  UA negative for infection  CXR with reticular opacities over bibasilar lungs on prelim read    Given CTX and azithromycin.   Admitted to medicine for further evaluation.  (25 Sep 2024 22:23)    10/3- resting in bed, no new complaints.  10/4- resting in bed, no angina or palpitations.  Date of service 10/5 pt in bed, he offers no complaints          acetaminophen     Tablet .. 650 milliGRAM(s) Oral every 6 hours PRN  albuterol/ipratropium for Nebulization 3 milliLiter(s) Nebulizer every 6 hours PRN  albuterol/ipratropium for Nebulization. 3 milliLiter(s) Nebulizer once  aluminum hydroxide/magnesium hydroxide/simethicone Suspension 30 milliLiter(s) Oral every 4 hours PRN  atorvastatin 10 milliGRAM(s) Oral at bedtime  buDESOnide    Inhalation Suspension 0.5 milliGRAM(s) Inhalation every 12 hours  clopidogrel Tablet 75 milliGRAM(s) Oral daily  diltiazem    milliGRAM(s) Oral daily  enoxaparin Injectable 40 milliGRAM(s) SubCutaneous every 24 hours  finasteride 5 milliGRAM(s) Oral daily  furosemide    Tablet 40 milliGRAM(s) Oral once  melatonin 3 milliGRAM(s) Oral at bedtime PRN  ondansetron Injectable 4 milliGRAM(s) IV Push every 8 hours PRN  predniSONE   Tablet 10 milliGRAM(s) Oral daily  senna 2 Tablet(s) Oral at bedtime  tamsulosin 0.4 milliGRAM(s) Oral at bedtime                            15.9   9.45  )-----------( 213      ( 04 Oct 2024 06:10 )             48.6       Hemoglobin: 15.9 g/dL (10-04 @ 06:10)  Hemoglobin: 13.2 g/dL (10-02 @ 05:55)  Hemoglobin: 13.4 g/dL (10-01 @ 05:44)      10-04    144  |  105  |  12  ----------------------------<  130[H]  4.0   |  23  |  0.65    Ca    9.5      04 Oct 2024 06:10  Phos  2.5     10-04  Mg     2.00     10-04      Creatinine Trend: 0.65<--, 0.74<--, 0.73<--, 0.86<--, 0.78<--, 0.88<--    COAGS:           T(C): 36.6 (10-05-24 @ 05:24), Max: 36.6 (10-05-24 @ 05:24)  HR: 66 (10-05-24 @ 07:19) (66 - 98)  BP: 113/69 (10-05-24 @ 05:24) (113/69 - 145/96)  RR: 16 (10-05-24 @ 05:24) (16 - 18)  SpO2: 100% (10-05-24 @ 07:19) (95% - 100%)  Wt(kg): --    I&O's Summary    Appearance: elderly man, sleepy and confused.	  HEENT:   Normal oral mucosa, PERRL, EOMI	  Lymphatic: No lymphadenopathy , no edema  Cardiovascular: Normal S1 S2, No JVD, No murmurs , Peripheral pulses palpable 2+ bilaterally  Respiratory: Lungs clear to auscultation, normal effort 	  Gastrointestinal:  Soft, Non-tender, + BS	  Skin: No rashes, No ecchymoses, No cyanosis, warm to touch  Musculoskeletal: Normal range of motion, normal strength  Psychiatry:  Mood & affect appropriate    TELEMETRY: AFib/AFlutter	    Echo: < from: TTE W or WO Ultrasound Enhancing Agent (09.26.24 @ 15:20) >     1. Left ventricular systolic function is normal with an ejection fraction of 69 % by Alex's method ofdisks.   2. Enlargd right ventricular cavity size and reduced right ventricular systolic function.   3. Left atrium is normal in size.   4. The aortic valve appears trileaflet with reduced systolic excursion. There is moderate aortic stenosis. The peak transaortic velocity is 2.37 m/s, peak transaortic gradient is 22.5 mmHg and mean transaortic gradient is 11.0 mmHg with an LVOT/aortic valve VTI ratio of 0.39. The aortic valve area is estimated at 1.21 cm² by the continuity equation.   5. Estimated pulmonary artery systolic pressure is 57 mmHg, consistent with moderate pulmonary hypertension.   6. No pericardial effusion seen.    < end of copied text >  	  ASSESSMENT/PLAN: Mr Barajas is a 89y Male here from nursing home w/ shortness of breath.  Has COPD, may have superimposed pneumonia.  New dx of atrial arrhythmia, may be causing acute diastolic heart failure.  Continue AV aniket blockade w/ diltiazem.  Heartrate is adequately controlled now.  Backup plan is a Digoxin load.  Awaiting remainder of workup and goals of care before starting oral anticoagulation for stroke prevention.

## 2024-10-05 NOTE — PROGRESS NOTE ADULT - SUBJECTIVE AND OBJECTIVE BOX
Date of Service: 10-05-24 @ 11:17    Patient is a 89y old  Male who presents with a chief complaint of acute metabolic encephalopathy (05 Oct 2024 09:00)      Any change in ROS: confused on 6 L o f oxygen :  no sob:  looks comfortable:       MEDICATIONS  (STANDING):  albuterol/ipratropium for Nebulization. 3 milliLiter(s) Nebulizer once  atorvastatin 10 milliGRAM(s) Oral at bedtime  buDESOnide    Inhalation Suspension 0.5 milliGRAM(s) Inhalation every 12 hours  clopidogrel Tablet 75 milliGRAM(s) Oral daily  diltiazem    milliGRAM(s) Oral daily  enoxaparin Injectable 40 milliGRAM(s) SubCutaneous every 24 hours  finasteride 5 milliGRAM(s) Oral daily  furosemide    Tablet 40 milliGRAM(s) Oral once  predniSONE   Tablet 10 milliGRAM(s) Oral daily  senna 2 Tablet(s) Oral at bedtime  tamsulosin 0.4 milliGRAM(s) Oral at bedtime    MEDICATIONS  (PRN):  acetaminophen     Tablet .. 650 milliGRAM(s) Oral every 6 hours PRN Temp greater or equal to 38C (100.4F), Mild Pain (1 - 3)  albuterol/ipratropium for Nebulization 3 milliLiter(s) Nebulizer every 6 hours PRN Shortness of Breath and/or Wheezing  aluminum hydroxide/magnesium hydroxide/simethicone Suspension 30 milliLiter(s) Oral every 4 hours PRN Dyspepsia  melatonin 3 milliGRAM(s) Oral at bedtime PRN Insomnia  ondansetron Injectable 4 milliGRAM(s) IV Push every 8 hours PRN Nausea and/or Vomiting    Vital Signs Last 24 Hrs  T(C): 36.6 (05 Oct 2024 05:24), Max: 36.6 (05 Oct 2024 05:24)  T(F): 97.9 (05 Oct 2024 05:24), Max: 97.9 (05 Oct 2024 05:24)  HR: 66 (05 Oct 2024 07:19) (66 - 98)  BP: 113/69 (05 Oct 2024 05:24) (113/69 - 145/96)  BP(mean): --  RR: 16 (05 Oct 2024 05:24) (16 - 18)  SpO2: 100% (05 Oct 2024 07:19) (95% - 100%)    Parameters below as of 05 Oct 2024 07:19  Patient On (Oxygen Delivery Method): nasal cannula        I&O's Summary        Physical Exam:   GENERAL: NAD, well-groomed, well-developed  HEENT: SUGEY/   Atraumatic, Normocephalic  ENMT: No tonsillar erythema, exudates, or enlargement; Moist mucous membranes, Good dentition, No lesions  NECK: Supple, No JVD, Normal thyroid  CHEST/LUNG: Clear to auscultaion- no wheezing  CVS: Regular rate and rhythm; No murmurs, rubs, or gallops  GI: : Soft, Nontender, Nondistended; Bowel sounds present  NERVOUS SYSTEM:  Awake and confused:   EXTREMITIES: - edema  LYMPH: No lymphadenopathy noted  SKIN: No rashes or lesions  ENDOCRINOLOGY: No Thyromegaly  PSYCH: calm     Labs:  27                            15.9   9.45  )-----------( 213      ( 04 Oct 2024 06:10 )             48.6                         13.2   7.98  )-----------( 193      ( 02 Oct 2024 05:55 )             40.8     10-04    144  |  105  |  12  ----------------------------<  130[H]  4.0   |  23  |  0.65  10-02    141  |  105  |  17  ----------------------------<  98  4.3   |  25  |  0.74    Ca    9.5      04 Oct 2024 06:10  Phos  2.5     10-04  Mg     2.00     10-04      CAPILLARY BLOOD GLUCOSE              Urinalysis Basic - ( 04 Oct 2024 06:10 )    Color: x / Appearance: x / SG: x / pH: x  Gluc: 130 mg/dL / Ketone: x  / Bili: x / Urobili: x   Blood: x / Protein: x / Nitrite: x   Leuk Esterase: x / RBC: x / WBC x   Sq Epi: x / Non Sq Epi: x / Bacteria: x        ct< from: CT Chest No Cont (09.29.24 @ 13:10) >    CHEST WALL AND BONES: Degenerative changes of the thoracic spine. A 9 mm   right thyroid lobe hypoattenuating nodule (2-39). 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    IMPRESSION:    Extensive emphysematous changes.    Bibasilar predominant peripheral reticular opacities, traction   bronchiectasis, and subpleural cystic changes, compatible with   interstitial lung disease.    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  Consider PET scan or 3 month follow up.    Small left pleural effusion with basilar pleural thickening.    Partially imaged left parotid gland mass. Consider further evaluation   with contrast-enhanced MR neck from more definitive characterization.    --- End of Report ---    < end of copied text >      RECENT CULTURES:        RESPIRATORY CULTURES:          Studies  Chest X-RAY  CT SCAN Chest   Venous Dopplers: LE:   CT Abdomen  Others

## 2024-10-05 NOTE — PROGRESS NOTE ADULT - ASSESSMENT
89M with PMH cognitive dysfunction (AAOx2 at baseline), advanced COPD on daily prednisone with chronic respiratory failure on 4L NC, CAD/MI s/p stent, HLD, and BPH BIBEMS from [facility] for confusion x 2 days. Chest imaging suggestive of pulmonary edema vs pneumonia.         Problem/Plan - 1:  ·  Problem: Acute metabolic encephalopathy.   -likely worsening dementia   all c/s neg   being treated for PNA  - frequent reorientation- maintain normal sleep/wake cycles (avoid waking patient between 11PM and 6AM unless medically necessary).    Problem/Plan - 2:·  Problem: Leukocytosis.   resolved   ID following   c/w IV abx for now     Problem/Plan - 3:·  Problem: Advanced COPD.   ·  Plan: low suspicion for COPD exacerbation, lung exam with localized rhonchi/coarse crackles over R>L base. No increased baseline O2 requirement or worsening of chronic cough  - continue home prednisone 10mg daily   - duonebs PRN.  pul following   still on 6 L n/c , taper as tolerated     Problem/Plan - 4:  ·  Problem: lung nodule : could be malignancy  ·  Plan: - CT reviewed  - Family does not want baird or treatment   - family wants comfort measures only     Problem/Plan - 5:  ·  Problem: CAD (coronary artery disease).   ·  Plan: - continue home statin, plavix.    Problem/Plan - 6:  ·  Problem: BPH (benign prostatic hyperplasia).   ·  Plan: - continue home finasteride and tamsulosin.    Problem/Plan - 7:  ·  Problem: new aflutter   ·  Plan: cw tele  started on cardizem   echo : nml EF , likely diastolic failuire   seen by EP cardiology   HR is controlled   not candidate for any AC : 2/2 agitation and fall risk     esau hernández MD   covering Dr. Cooper

## 2024-10-05 NOTE — PROGRESS NOTE ADULT - SUBJECTIVE AND OBJECTIVE BOX
Patient is a 89y old  Male who presents with a chief complaint of acute metabolic encephalopathy (05 Oct 2024 13:10)      INTERVAL HPI/OVERNIGHT EVENTS: seen and examined   T(C): 36.4 (10-05-24 @ 12:09), Max: 36.6 (10-05-24 @ 05:24)  HR: 65 (10-05-24 @ 12:09) (65 - 68)  BP: 107/57 (10-05-24 @ 12:09) (107/57 - 113/69)  RR: 18 (10-05-24 @ 12:09) (16 - 18)  SpO2: 95% (10-05-24 @ 12:09) (95% - 100%)  Wt(kg): --  I&O's Summary      PAST MEDICAL & SURGICAL HISTORY:  Advanced COPD      Chronic respiratory failure with hypoxia      CAD (coronary artery disease)      History of myocardial infarction      Chronic cough      HLD (hyperlipidemia)      BPH (benign prostatic hyperplasia)      No significant past surgical history          SOCIAL HISTORY  Alcohol:  Tobacco:  Illicit substance use:    FAMILY HISTORY:    REVIEW OF SYSTEMS:  CONSTITUTIONAL: No fever, weight loss, or fatigue  EYES: No eye pain, visual disturbances, or discharge  ENMT:  No difficulty hearing, tinnitus, vertigo; No sinus or throat pain  NECK: No pain or stiffness  RESPIRATORY: No cough, wheezing, chills or hemoptysis; No shortness of breath  CARDIOVASCULAR: No chest pain, palpitations, dizziness, or leg swelling  GASTROINTESTINAL: No abdominal or epigastric pain. No nausea, vomiting, or hematemesis; No diarrhea or constipation. No melena or hematochezia.  GENITOURINARY: No dysuria, frequency, hematuria, or incontinence  NEUROLOGICAL: No headaches, memory loss, loss of strength, numbness, or tremors  SKIN: No itching, burning, rashes, or lesions   LYMPH NODES: No enlarged glands  ENDOCRINE: No heat or cold intolerance; No hair loss  MUSCULOSKELETAL: No joint pain or swelling; No muscle, back, or extremity pain  PSYCHIATRIC: No depression, anxiety, mood swings, or difficulty sleeping  HEME/LYMPH: No easy bruising, or bleeding gums  ALLERY AND IMMUNOLOGIC: No hives or eczema    RADIOLOGY & ADDITIONAL TESTS:    Imaging Personally Reviewed:  [ ] YES  [ ] NO    Consultant(s) Notes Reviewed:  [ ] YES  [ ] NO    PHYSICAL EXAM:  GENERAL: NAD, well-groomed, well-developed  HEAD:  Atraumatic, Normocephalic  EYES: EOMI, PERRLA, conjunctiva and sclera clear  ENMT: No tonsillar erythema, exudates, or enlargement; Moist mucous membranes, Good dentition, No lesions  NECK: Supple, No JVD, Normal thyroid  NERVOUS SYSTEM:  Alert & Oriented X3, Good concentration; Motor Strength 5/5 B/L upper and lower extremities; DTRs 2+ intact and symmetric  CHEST/LUNG: Clear to percussion bilaterally; No rales, rhonchi, wheezing, or rubs  HEART: Regular rate and rhythm; No murmurs, rubs, or gallops  ABDOMEN: Soft, Nontender, Nondistended; Bowel sounds present  EXTREMITIES:  2+ Peripheral Pulses, No clubbing, cyanosis, or edema  LYMPH: No lymphadenopathy noted  SKIN: No rashes or lesions    LABS:                        15.9   9.45  )-----------( 213      ( 04 Oct 2024 06:10 )             48.6     10-04    144  |  105  |  12  ----------------------------<  130[H]  4.0   |  23  |  0.65    Ca    9.5      04 Oct 2024 06:10  Phos  2.5     10-04  Mg     2.00     10-04        Urinalysis Basic - ( 04 Oct 2024 06:10 )    Color: x / Appearance: x / SG: x / pH: x  Gluc: 130 mg/dL / Ketone: x  / Bili: x / Urobili: x   Blood: x / Protein: x / Nitrite: x   Leuk Esterase: x / RBC: x / WBC x   Sq Epi: x / Non Sq Epi: x / Bacteria: x      CAPILLARY BLOOD GLUCOSE            Urinalysis Basic - ( 04 Oct 2024 06:10 )    Color: x / Appearance: x / SG: x / pH: x  Gluc: 130 mg/dL / Ketone: x  / Bili: x / Urobili: x   Blood: x / Protein: x / Nitrite: x   Leuk Esterase: x / RBC: x / WBC x   Sq Epi: x / Non Sq Epi: x / Bacteria: x        MEDICATIONS  (STANDING):  albuterol/ipratropium for Nebulization. 3 milliLiter(s) Nebulizer once  atorvastatin 10 milliGRAM(s) Oral at bedtime  buDESOnide    Inhalation Suspension 0.5 milliGRAM(s) Inhalation every 12 hours  clopidogrel Tablet 75 milliGRAM(s) Oral daily  diltiazem    milliGRAM(s) Oral daily  enoxaparin Injectable 40 milliGRAM(s) SubCutaneous every 24 hours  finasteride 5 milliGRAM(s) Oral daily  furosemide    Tablet 40 milliGRAM(s) Oral once  predniSONE   Tablet 10 milliGRAM(s) Oral daily  senna 2 Tablet(s) Oral at bedtime  tamsulosin 0.4 milliGRAM(s) Oral at bedtime    MEDICATIONS  (PRN):  acetaminophen     Tablet .. 650 milliGRAM(s) Oral every 6 hours PRN Temp greater or equal to 38C (100.4F), Mild Pain (1 - 3)  albuterol/ipratropium for Nebulization 3 milliLiter(s) Nebulizer every 6 hours PRN Shortness of Breath and/or Wheezing  aluminum hydroxide/magnesium hydroxide/simethicone Suspension 30 milliLiter(s) Oral every 4 hours PRN Dyspepsia  melatonin 3 milliGRAM(s) Oral at bedtime PRN Insomnia  ondansetron Injectable 4 milliGRAM(s) IV Push every 8 hours PRN Nausea and/or Vomiting      Care Discussed with Consultants/Other Providers [ ] YES  [ ] NO

## 2024-10-05 NOTE — PROGRESS NOTE ADULT - ASSESSMENT
89M with PMH cognitive dysfunction (AAOx2 at baseline), advanced COPD on daily prednisone with chronic respiratory failure on 4L NC, CAD/MI s/p stent, HLD, and BPH BIBEMS from [facility] for confusion in setting of suspected pneumonia. Pt does not know why he was brought to the hospital, unable to provide much history. Collateral obtained from daughter/HCP Meghan Gonzalez. Pt noted to have intermittent episodes of confusion by NH staff over the past 2 days, occasionally become agitated, saying nonsensical things, all unlike him. Pt is AAOx2 at baseline, would not know the year, but otherwise conversational. Whenever he was visited by daughter or son though, he was found to be at his baseline. He had a UA done that was unremarkable and a CXR that showed "pulmonary vascular congestion with patchy bibasilar infriltrates"- copy in pt's chart. And he was started on doxycycline yesterday, unclear how many doses he took. Pt with a chronic cough, did not notice anything like increased cough or sputum production from baseline. No witnessed episodes of shortness of breath, no increase in baseline oxygen requirement. No known fevers. Meghan believes patient has a history of heart failure and takes lasix regularly, however, heart failure not listed as a diagnosis from NH or [facility] outpatient pulm notes, lasix also not listed on medication list from NH or surescripts.   Pt himself currently feels well. Denies having any trouble breathing, chest pain, abdominal pain, or any other complaints.     Acute metabolic encephalopathy.   -check repeat CT head -NOT DONE YET;  WAS CANCELLED   - could be related to infection?  pneumonia,  uti etc:  being rule dout  - his VBG is fine with no retention of co2:  so thats not the reason of him getting more confused;     9/29;  -he is still confused?  baseline not sure;   -he is not wheezing;  awaiting ct chest   -heis still on 5 L:  try to bring down ; sao2 gaol is 88%; ex smoker     9/30: he is still  pretty confused;    -on chr prednisone for copd;   he is not wheezing    10/1; seems to be doing  ok ;on 4 L of oxygen :  10/2: on 4 L of oxygen ; try to minimise o x as tolerated  10/3:  called daughter : she is aware of him having malignancy probably:   -on prednisone  -daughter does not to work up for malignancy  :  10/4:  -he remains OK;  les acitated today  ;  alert and awke:   no resp distress:  no wheezing:  for malignancy as above   10/5  -he seems comfortable:   -he is on 6 L of oxygen : need to decrease:  the goal of o2 saturation is arround 885 in his copd pt: he does not need this much of oxygen    -cont BD   -he is on chr 10 mg of prednisone  -he is delirious too :     Mild leukocytosis to 12k on admission  - infectious vs reactive. afebrile, not septic, hemodynamically stable.   -Without any new fevers,   -URI sxs, worsening of chronic cough, or increased O2 requirements  - Procal negative.  - cw abx as per ID  ; to cont for now;   -needs ct chest     9/29; -on no antibiotics   -afebrile today  ;  wbc is normal today     9/30: remains afebrile and wbc is normal   10/1: seems OK:   10/2: cont current care:   10/4:  leucocytosis resolved;   10/5:l resolved     Advanced COPD.   -ow suspicion for COPD exacerbation, lung exam with localized rhonchi/coarse crackles over R>L base. No increased baseline O2 requirement or worsening of chronic cough  - continue home prednisone 10mg daily   - duonebs PRN.  -and his VBg also did not show any  significant retention ofpco2:     9/30:  -cont current rx:  he seems stable:   -ct chest showed; Extensive emphysematous changes. Bibasilar predominant peripheral reticular opacities, traction bronchiectasis, and subpleural cystic changes, compatible with   interstitial lung disease. Right lung pulmonary nodules, measuring up to 1.8 cm, concerning for neoplasia.  Consider PET scan or 3 month follow up. Small left pleural effusion with basilar pleural thickening. Partially imaged left parotid gland mass. Consider further evaluation with contrast-enhanced MR neck from more definitive characterization.    10/1:  dw daughter : she will decide about the pulm nodule:  and has parotid gland tumor ; work up per primary team     10/3: no workup per daughter   10/4:  pulm wise he seems stable:  no sob:  no wheezing   10/5: need to decrease the oxygen as documented above     Chronic hypoxic respiratory failure.   ·  Plan: - continue home NC at 4L NC, goal 88-92&-likely due to copd   10/2; seems to be doing  ok :   10/03: cont 4 L of NC :  he is not wheezing:  vbg ordered   10/04;-VBG is very good:   10/5: VBG reasonable  no co2 retention:  reduce oxygen      CAD (coronary artery disease).    - continue home statin, plavix.    -NEW aflutter   -echo showed;  1. Left ventricular systolic function is normal with an ejection fraction of 69 % by Alex's method of disks.   2. Enlarged right ventricular cavity size and reduced right ventricular systolic function.   3. Left atrium is normal in size.   4. The aortic valve appears trileaflet with reduced systolic excursion. There is moderate aortic stenosis. The peak transaortic velocity is 2.37 m/s, peak transaortic gradient is 22.5 mmHg and mean transaortic gradient is 11.0 mmHg with an LVOT/aortic valve VTI ratio of 0.39. The aortic valve area is estimated at 1.21 cm² by the continuity equation.   5. Estimated pulmonary artery systolic pressure is 57 mmHg, consistent with moderate pulmonary hypertension.  defer to cards     10/5L converted back to NSR : daughter refused for AC:        yancy acp

## 2024-10-05 NOTE — PROGRESS NOTE ADULT - SUBJECTIVE AND OBJECTIVE BOX
DATE OF SERVICE: 10-05-24    No overnight events,agitated at times    MEDICATIONS:  acetaminophen     Tablet .. 650 milliGRAM(s) Oral every 6 hours PRN  albuterol/ipratropium for Nebulization 3 milliLiter(s) Nebulizer every 6 hours PRN  albuterol/ipratropium for Nebulization. 3 milliLiter(s) Nebulizer once  aluminum hydroxide/magnesium hydroxide/simethicone Suspension 30 milliLiter(s) Oral every 4 hours PRN  atorvastatin 10 milliGRAM(s) Oral at bedtime  buDESOnide    Inhalation Suspension 0.5 milliGRAM(s) Inhalation every 12 hours  clopidogrel Tablet 75 milliGRAM(s) Oral daily  diltiazem    milliGRAM(s) Oral daily  enoxaparin Injectable 40 milliGRAM(s) SubCutaneous every 24 hours  finasteride 5 milliGRAM(s) Oral daily  furosemide    Tablet 40 milliGRAM(s) Oral once  melatonin 3 milliGRAM(s) Oral at bedtime PRN  ondansetron Injectable 4 milliGRAM(s) IV Push every 8 hours PRN  predniSONE   Tablet 10 milliGRAM(s) Oral daily  senna 2 Tablet(s) Oral at bedtime  tamsulosin 0.4 milliGRAM(s) Oral at bedtime      LABS:                        15.9   9.45  )-----------( 213      ( 04 Oct 2024 06:10 )             48.6       Hemoglobin: 15.9 g/dL (10-04 @ 06:10)  Hemoglobin: 13.2 g/dL (10-02 @ 05:55)  Hemoglobin: 13.4 g/dL (10-01 @ 05:44)      10-04    144  |  105  |  12  ----------------------------<  130[H]  4.0   |  23  |  0.65    Ca    9.5      04 Oct 2024 06:10  Phos  2.5     10-04  Mg     2.00     10-04      Creatinine Trend: 0.65<--, 0.74<--, 0.73<--, 0.86<--, 0.78<--, 0.88<--    COAGS:           PHYSICAL EXAM:  T(C): 36.4 (10-05-24 @ 12:09), Max: 36.6 (10-05-24 @ 05:24)  HR: 65 (10-05-24 @ 12:09) (65 - 98)  BP: 107/57 (10-05-24 @ 12:09) (107/57 - 145/96)  RR: 18 (10-05-24 @ 12:09) (16 - 18)  SpO2: 95% (10-05-24 @ 12:09) (95% - 100%)  Wt(kg): --    I&O's Summary      General: NAD  Head: Normocephalic and atraumatic.   Neck: No JVD. No bruits. Supple. Does not appear to be enlarged.   Cardiovascular: + S1,S2 ; RRR Soft systolic murmur at the left lower sternal border. No rubs noted.    Lungs: CTA b/l. No rhonchi, rales or wheezes.   Abdomen: + BS, soft. Non tender. Non distended. No rebound. No guarding.   Extremities: No clubbing/cyanosis/edema.   Neurologic: Moves all four extremities. Full range of motion.   Skin: Warm and moist. The patient's skin has normal elasticity and good skin turgor.   Psychiatric: cannot eval  Musculoskeletal: Normal range of motion, normal strength    DATA    tele- NSR 50s      ECG:  	AF 140s    < from: TTE W or WO Ultrasound Enhancing Agent (09.26.24 @ 15:20) >     CONCLUSIONS:      1. Left ventricular systolic function is normal with an ejection fraction of 69 % by Alex's method ofdisks.   2. Enlarged right ventricular cavity size and reduced right ventricular systolic function.   3. Left atrium is normal in size.   4. The aortic valve appears trileaflet with reduced systolic excursion. There is moderate aortic stenosis. The peak transaortic velocity is 2.37 m/s, peak transaortic gradient is 22.5 mmHg and mean transaortic gradient is 11.0 mmHg with an LVOT/aortic valve VTI ratio of 0.39. The aortic valve area is estimated at 1.21 cm² by the continuity equation.   5. Estimated pulmonary artery systolic pressure is 57 mmHg, consistent with moderate pulmonary hypertension.   6. No pericardial effusion seen.  < end of copied text >      ASSESSMENT/PLAN: 	  89M with PMH cognitive dysfunction (AAOx2 at baseline), advanced COPD on daily prednisone with chronic respiratory failure on 4L NC, CAD/MI s/p stent 15 years ago, HLD, and BPH BIBEMS from [facility] for confusion in setting of suspected pneumonia. Cardiology consulted for New onset Rapid Afib /?flutter today    --TTE noted with Normal LVEF, mod AS, mod Pulm HTN  --remains in SR on cardizem CD 120mg daily  --Despite [id number] of atleast 3, given AMS/agitation/lung mass, hold off on full Ac for now  --Ct head/chest noted--family does not want malignancy w/u, spoke with daughter states that he is fairly bedbound over last couple months and needs help with ADLs also reports that since his mental status has been waxing and waning would prefer to hold of AC for now and its bleeding risk    Hyun Cooper MD  Pager: [phone]

## 2024-10-06 LAB
ANION GAP SERPL CALC-SCNC: 13 MMOL/L — SIGNIFICANT CHANGE UP (ref 7–14)
BASOPHILS # BLD AUTO: 0.04 K/UL — SIGNIFICANT CHANGE UP (ref 0–0.2)
BASOPHILS NFR BLD AUTO: 0.5 % — SIGNIFICANT CHANGE UP (ref 0–2)
BUN SERPL-MCNC: 22 MG/DL — SIGNIFICANT CHANGE UP (ref 7–23)
CALCIUM SERPL-MCNC: 9.4 MG/DL — SIGNIFICANT CHANGE UP (ref 8.4–10.5)
CHLORIDE SERPL-SCNC: 108 MMOL/L — HIGH (ref 98–107)
CO2 SERPL-SCNC: 22 MMOL/L — SIGNIFICANT CHANGE UP (ref 22–31)
CREAT SERPL-MCNC: 0.81 MG/DL — SIGNIFICANT CHANGE UP (ref 0.5–1.3)
EGFR: 82 ML/MIN/1.73M2 — SIGNIFICANT CHANGE UP
EOSINOPHIL # BLD AUTO: 0.23 K/UL — SIGNIFICANT CHANGE UP (ref 0–0.5)
EOSINOPHIL NFR BLD AUTO: 3.1 % — SIGNIFICANT CHANGE UP (ref 0–6)
GLUCOSE SERPL-MCNC: 121 MG/DL — HIGH (ref 70–99)
HCT VFR BLD CALC: 46.9 % — SIGNIFICANT CHANGE UP (ref 39–50)
HGB BLD-MCNC: 14.5 G/DL — SIGNIFICANT CHANGE UP (ref 13–17)
IANC: 4.8 K/UL — SIGNIFICANT CHANGE UP (ref 1.8–7.4)
IMM GRANULOCYTES NFR BLD AUTO: 0.3 % — SIGNIFICANT CHANGE UP (ref 0–0.9)
LYMPHOCYTES # BLD AUTO: 1.72 K/UL — SIGNIFICANT CHANGE UP (ref 1–3.3)
LYMPHOCYTES # BLD AUTO: 23.2 % — SIGNIFICANT CHANGE UP (ref 13–44)
MAGNESIUM SERPL-MCNC: 2.2 MG/DL — SIGNIFICANT CHANGE UP (ref 1.6–2.6)
MCHC RBC-ENTMCNC: 29.9 PG — SIGNIFICANT CHANGE UP (ref 27–34)
MCHC RBC-ENTMCNC: 30.9 GM/DL — LOW (ref 32–36)
MCV RBC AUTO: 96.7 FL — SIGNIFICANT CHANGE UP (ref 80–100)
MONOCYTES # BLD AUTO: 0.61 K/UL — SIGNIFICANT CHANGE UP (ref 0–0.9)
MONOCYTES NFR BLD AUTO: 8.2 % — SIGNIFICANT CHANGE UP (ref 2–14)
NEUTROPHILS # BLD AUTO: 4.8 K/UL — SIGNIFICANT CHANGE UP (ref 1.8–7.4)
NEUTROPHILS NFR BLD AUTO: 64.7 % — SIGNIFICANT CHANGE UP (ref 43–77)
NRBC # BLD: 0 /100 WBCS — SIGNIFICANT CHANGE UP (ref 0–0)
NRBC # FLD: 0 K/UL — SIGNIFICANT CHANGE UP (ref 0–0)
PHOSPHATE SERPL-MCNC: 3.7 MG/DL — SIGNIFICANT CHANGE UP (ref 2.5–4.5)
PLATELET # BLD AUTO: 180 K/UL — SIGNIFICANT CHANGE UP (ref 150–400)
POTASSIUM SERPL-MCNC: 5.1 MMOL/L — SIGNIFICANT CHANGE UP (ref 3.5–5.3)
POTASSIUM SERPL-SCNC: 5.1 MMOL/L — SIGNIFICANT CHANGE UP (ref 3.5–5.3)
RBC # BLD: 4.85 M/UL — SIGNIFICANT CHANGE UP (ref 4.2–5.8)
RBC # FLD: 15.9 % — HIGH (ref 10.3–14.5)
SODIUM SERPL-SCNC: 143 MMOL/L — SIGNIFICANT CHANGE UP (ref 135–145)
WBC # BLD: 7.42 K/UL — SIGNIFICANT CHANGE UP (ref 3.8–10.5)
WBC # FLD AUTO: 7.42 K/UL — SIGNIFICANT CHANGE UP (ref 3.8–10.5)

## 2024-10-06 RX ADMIN — Medication 0.5 MILLIGRAM(S): at 19:04

## 2024-10-06 RX ADMIN — ENOXAPARIN SODIUM 40 MILLIGRAM(S): 150 INJECTION SUBCUTANEOUS at 11:19

## 2024-10-06 RX ADMIN — Medication 0.4 MILLIGRAM(S): at 22:51

## 2024-10-06 RX ADMIN — ATORVASTATIN CALCIUM 10 MILLIGRAM(S): 10 TABLET, FILM COATED ORAL at 22:51

## 2024-10-06 RX ADMIN — Medication 0.5 MILLIGRAM(S): at 07:22

## 2024-10-06 RX ADMIN — FINASTERIDE 5 MILLIGRAM(S): 5 TABLET, FILM COATED ORAL at 11:19

## 2024-10-06 RX ADMIN — PREDNISONE 10 MILLIGRAM(S): 5 TABLET ORAL at 06:10

## 2024-10-06 RX ADMIN — Medication 2 TABLET(S): at 22:51

## 2024-10-06 RX ADMIN — Medication 75 MILLIGRAM(S): at 11:19

## 2024-10-06 RX ADMIN — Medication 120 MILLIGRAM(S): at 06:10

## 2024-10-06 NOTE — PROGRESS NOTE ADULT - SUBJECTIVE AND OBJECTIVE BOX
Date of Service: 10-06-24 @ 10:07    Patient is a 89y old  Male who presents with a chief complaint of acute metabolic encephalopathy (06 Oct 2024 08:38)      Any change in ROS: Sleeping comfortably:   no sob:  apparent  on 5 L of oxygen      MEDICATIONS  (STANDING):  albuterol/ipratropium for Nebulization. 3 milliLiter(s) Nebulizer once  atorvastatin 10 milliGRAM(s) Oral at bedtime  buDESOnide    Inhalation Suspension 0.5 milliGRAM(s) Inhalation every 12 hours  clopidogrel Tablet 75 milliGRAM(s) Oral daily  diltiazem    milliGRAM(s) Oral daily  enoxaparin Injectable 40 milliGRAM(s) SubCutaneous every 24 hours  finasteride 5 milliGRAM(s) Oral daily  furosemide    Tablet 40 milliGRAM(s) Oral once  predniSONE   Tablet 10 milliGRAM(s) Oral daily  senna 2 Tablet(s) Oral at bedtime  tamsulosin 0.4 milliGRAM(s) Oral at bedtime    MEDICATIONS  (PRN):  acetaminophen     Tablet .. 650 milliGRAM(s) Oral every 6 hours PRN Temp greater or equal to 38C (100.4F), Mild Pain (1 - 3)  albuterol/ipratropium for Nebulization 3 milliLiter(s) Nebulizer every 6 hours PRN Shortness of Breath and/or Wheezing  aluminum hydroxide/magnesium hydroxide/simethicone Suspension 30 milliLiter(s) Oral every 4 hours PRN Dyspepsia  melatonin 3 milliGRAM(s) Oral at bedtime PRN Insomnia  ondansetron Injectable 4 milliGRAM(s) IV Push every 8 hours PRN Nausea and/or Vomiting    Vital Signs Last 24 Hrs  T(C): 36.7 (06 Oct 2024 06:02), Max: 36.7 (06 Oct 2024 06:02)  T(F): 98.1 (06 Oct 2024 06:02), Max: 98.1 (06 Oct 2024 06:02)  HR: 67 (06 Oct 2024 07:22) (63 - 68)  BP: 128/55 (06 Oct 2024 06:02) (107/57 - 144/81)  BP(mean): --  RR: 18 (06 Oct 2024 06:02) (18 - 18)  SpO2: 100% (06 Oct 2024 07:22) (95% - 100%)    Parameters below as of 06 Oct 2024 07:22  Patient On (Oxygen Delivery Method): nasal cannula        I&O's Summary        Physical Exam:   GENERAL: NAD, well-groomed, well-developed  HEENT: SUGEY/   Atraumatic, Normocephalic  ENMT: No tonsillar erythema, exudates, or enlargement; Moist mucous membranes, Good dentition, No lesions  NECK: Supple, No JVD, Normal thyroid  CHEST/LUNG: Clear to auscultaio-  CVS: Regular rate and rhythm; No murmurs, rubs, or gallops  GI: : Soft, Nontender, Nondistended; Bowel sounds present  NERVOUS SYSTEM:  sleepy  EXTREMITIES:  - edema  LYMPH: No lymphadenopathy noted  SKIN: No rashes or lesions  ENDOCRINOLOGY: No Thyromegaly  PSYCH: Demented    Labs:  27                            14.5   7.42  )-----------( 180      ( 06 Oct 2024 07:41 )             46.9                         15.9   9.45  )-----------( 213      ( 04 Oct 2024 06:10 )             48.6     10-06    143  |  108[H]  |  22  ----------------------------<  121[H]  5.1   |  22  |  0.81  10-04    144  |  105  |  12  ----------------------------<  130[H]  4.0   |  23  |  0.65    Ca    9.4      06 Oct 2024 07:41  Phos  3.7     10-06  Mg     2.20     10-06      CAPILLARY BLOOD GLUCOSE              Urinalysis Basic - ( 06 Oct 2024 07:41 )    Color: x / Appearance: x / SG: x / pH: x  Gluc: 121 mg/dL / Ketone: x  / Bili: x / Urobili: x   Blood: x / Protein: x / Nitrite: x   Leuk Esterase: x / RBC: x / WBC x   Sq Epi: x / Non Sq Epi: x / Bacteria: x    rad< from: CT Head No Cont (09.29.24 @ 13:10) >    MISCELLANEOUS: Incidental and partially imaged 1.5 x 2.4 cm soft tissue   attenuating mass in the left parotid gland at superior retrocondylar   location, and more peripheral 1.1 cm nodule that may be another tumor or   lymph node.      IMPRESSION:    No acute intracranial findings.    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 ENT referral is advised, and   further imaging with neck CT or MRI can be done as outpatient and tissue   sampling under ultrasound guidance.    --- End of Report ---          EMMA GUERRERO MD; Resident Radiologist  This document has been electronically signed.  OPAL RAMOS MD; Attending Radiologist  This document has been electronically signed. Sep 29 2024  1:25PM    < end of copied text >  < from: CT Chest No Cont (09.29.24 @ 13:10) >  right thyroid lobe hypoattenuating nodule (2-39). 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    IMPRESSION:    Extensive emphysematous changes.    Bibasilar predominant peripheral reticular opacities, traction   bronchiectasis, and subpleural cystic changes, compatible with   interstitial lung disease.    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  Consider PET scan or 3 month follow up.    Small left pleural effusion with basilar pleural thickening.    Partially imaged left parotid gland mass. Consider further evaluation   with contrast-enhanced MR neck from more definitive characterization.    --- End of Report ---    < end of copied text >          RECENT CULTURES:        RESPIRATORY CULTURES:          Studies  Chest X-RAY  CT SCAN Chest   Venous Dopplers: LE:   CT Abdomen  Others

## 2024-10-06 NOTE — PROGRESS NOTE ADULT - ASSESSMENT
89M with PMH cognitive dysfunction (AAOx2 at baseline), advanced COPD on daily prednisone with chronic respiratory failure on 4L NC, CAD/MI s/p stent, HLD, and BPH BIBEMS from [facility] for confusion x 2 days. Chest imaging suggestive of pulmonary edema vs pneumonia.         Problem/Plan - 1:  ·  Problem: Acute metabolic encephalopathy.   -likely worsening dementia   all c/s neg   being treated for PNA  - frequent reorientation- maintain normal sleep/wake cycles (avoid waking patient between 11PM and 6AM unless medically necessary).    Problem/Plan - 2:·  Problem: Leukocytosis.   resolved   ID following   c/w IV abx for now     Problem/Plan - 3:·  Problem: Advanced COPD.   ·  Plan: low suspicion for COPD exacerbation, lung exam with localized rhonchi/coarse crackles over R>L base. No increased baseline O2 requirement or worsening of chronic cough  - continue home prednisone 10mg daily   - duonebs PRN.  pul following   still on 5 L n/c , taper as tolerated     Problem/Plan - 4:  ·  Problem: lung nodule : could be malignancy  ·  Plan: - CT reviewed  - Family does not want w/u or treatment   - family wants comfort measures only     Problem/Plan - 5:  ·  Problem: CAD (coronary artery disease).   ·  Plan: - continue home statin, plavix.    Problem/Plan - 6:  ·  Problem: BPH (benign prostatic hyperplasia).   ·  Plan: - continue home finasteride and tamsulosin.    Problem/Plan - 7:  ·  Problem: new aflutter   ·  Plan: cw tele  started on cardizem   echo : nml EF , likely diastolic failuire   seen by EP cardiology   HR is controlled   not candidate for any AC : 2/2 agitation and fall risk     dispo: taper oxygen as tolerated to keep O2 sat >88%    esau hernández MD   covering Dr. Cooper

## 2024-10-06 NOTE — PROGRESS NOTE ADULT - SUBJECTIVE AND OBJECTIVE BOX
Patient is a 89y old  Male who presents with a chief complaint of acute metabolic encephalopathy (06 Oct 2024 12:22)      INTERVAL HPI/OVERNIGHT EVENTS: seen and examined, looks comfortable   T(C): 36.4 (10-06-24 @ 12:11), Max: 36.7 (10-06-24 @ 06:02)  HR: 65 (10-06-24 @ 12:11) (63 - 68)  BP: 106/59 (10-06-24 @ 12:11) (106/59 - 144/81)  RR: 18 (10-06-24 @ 12:11) (18 - 18)  SpO2: 100% (10-06-24 @ 12:11) (99% - 100%)  Wt(kg): --  I&O's Summary      PAST MEDICAL & SURGICAL HISTORY:  Advanced COPD      Chronic respiratory failure with hypoxia      CAD (coronary artery disease)      History of myocardial infarction      Chronic cough      HLD (hyperlipidemia)      BPH (benign prostatic hyperplasia)      No significant past surgical history          SOCIAL HISTORY  Alcohol:  Tobacco:  Illicit substance use:    FAMILY HISTORY:    REVIEW OF SYSTEMS:  CONSTITUTIONAL: No fever, weight loss, or fatigue  EYES: No eye pain, visual disturbances, or discharge  ENMT:  No difficulty hearing, tinnitus, vertigo; No sinus or throat pain  NECK: No pain or stiffness  RESPIRATORY: No cough, wheezing, chills or hemoptysis; No shortness of breath  CARDIOVASCULAR: No chest pain, palpitations, dizziness, or leg swelling  GASTROINTESTINAL: No abdominal or epigastric pain. No nausea, vomiting, or hematemesis; No diarrhea or constipation. No melena or hematochezia.  GENITOURINARY: No dysuria, frequency, hematuria, or incontinence  NEUROLOGICAL: No headaches, memory loss, loss of strength, numbness, or tremors  SKIN: No itching, burning, rashes, or lesions   LYMPH NODES: No enlarged glands  ENDOCRINE: No heat or cold intolerance; No hair loss  MUSCULOSKELETAL: No joint pain or swelling; No muscle, back, or extremity pain  PSYCHIATRIC: No depression, anxiety, mood swings, or difficulty sleeping  HEME/LYMPH: No easy bruising, or bleeding gums  ALLERY AND IMMUNOLOGIC: No hives or eczema    RADIOLOGY & ADDITIONAL TESTS:    Imaging Personally Reviewed:  [ ] YES  [ ] NO    Consultant(s) Notes Reviewed:  [ ] YES  [ ] NO    PHYSICAL EXAM:  GENERAL: NAD, well-groomed, well-developed  HEAD:  Atraumatic, Normocephalic  EYES: EOMI, PERRLA, conjunctiva and sclera clear  ENMT: No tonsillar erythema, exudates, or enlargement; Moist mucous membranes, Good dentition, No lesions  NECK: Supple, No JVD, Normal thyroid  NERVOUS SYSTEM:  Alert & Oriented X3, Good concentration; Motor Strength 5/5 B/L upper and lower extremities; DTRs 2+ intact and symmetric  CHEST/LUNG: Clear to percussion bilaterally; No rales, rhonchi, wheezing, or rubs  HEART: Regular rate and rhythm; No murmurs, rubs, or gallops  ABDOMEN: Soft, Nontender, Nondistended; Bowel sounds present  EXTREMITIES:  2+ Peripheral Pulses, No clubbing, cyanosis, or edema  LYMPH: No lymphadenopathy noted  SKIN: No rashes or lesions    LABS:                        14.5   7.42  )-----------( 180      ( 06 Oct 2024 07:41 )             46.9     10-06    143  |  108[H]  |  22  ----------------------------<  121[H]  5.1   |  22  |  0.81    Ca    9.4      06 Oct 2024 07:41  Phos  3.7     10-06  Mg     2.20     10-06        Urinalysis Basic - ( 06 Oct 2024 07:41 )    Color: x / Appearance: x / SG: x / pH: x  Gluc: 121 mg/dL / Ketone: x  / Bili: x / Urobili: x   Blood: x / Protein: x / Nitrite: x   Leuk Esterase: x / RBC: x / WBC x   Sq Epi: x / Non Sq Epi: x / Bacteria: x      CAPILLARY BLOOD GLUCOSE            Urinalysis Basic - ( 06 Oct 2024 07:41 )    Color: x / Appearance: x / SG: x / pH: x  Gluc: 121 mg/dL / Ketone: x  / Bili: x / Urobili: x   Blood: x / Protein: x / Nitrite: x   Leuk Esterase: x / RBC: x / WBC x   Sq Epi: x / Non Sq Epi: x / Bacteria: x        MEDICATIONS  (STANDING):  atorvastatin 10 milliGRAM(s) Oral at bedtime  buDESOnide    Inhalation Suspension 0.5 milliGRAM(s) Inhalation every 12 hours  clopidogrel Tablet 75 milliGRAM(s) Oral daily  diltiazem    milliGRAM(s) Oral daily  enoxaparin Injectable 40 milliGRAM(s) SubCutaneous every 24 hours  finasteride 5 milliGRAM(s) Oral daily  furosemide    Tablet 40 milliGRAM(s) Oral once  predniSONE   Tablet 10 milliGRAM(s) Oral daily  senna 2 Tablet(s) Oral at bedtime  tamsulosin 0.4 milliGRAM(s) Oral at bedtime    MEDICATIONS  (PRN):  acetaminophen     Tablet .. 650 milliGRAM(s) Oral every 6 hours PRN Temp greater or equal to 38C (100.4F), Mild Pain (1 - 3)  albuterol/ipratropium for Nebulization 3 milliLiter(s) Nebulizer every 6 hours PRN Shortness of Breath and/or Wheezing  aluminum hydroxide/magnesium hydroxide/simethicone Suspension 30 milliLiter(s) Oral every 4 hours PRN Dyspepsia  melatonin 3 milliGRAM(s) Oral at bedtime PRN Insomnia  ondansetron Injectable 4 milliGRAM(s) IV Push every 8 hours PRN Nausea and/or Vomiting      Care Discussed with Consultants/Other Providers [ ] YES  [ ] NO

## 2024-10-06 NOTE — PROGRESS NOTE ADULT - SUBJECTIVE AND OBJECTIVE BOX
DATE OF SERVICE: 10-06-24    No events    MEDICATIONS:  acetaminophen     Tablet .. 650 milliGRAM(s) Oral every 6 hours PRN  albuterol/ipratropium for Nebulization 3 milliLiter(s) Nebulizer every 6 hours PRN  albuterol/ipratropium for Nebulization. 3 milliLiter(s) Nebulizer once  aluminum hydroxide/magnesium hydroxide/simethicone Suspension 30 milliLiter(s) Oral every 4 hours PRN  atorvastatin 10 milliGRAM(s) Oral at bedtime  buDESOnide    Inhalation Suspension 0.5 milliGRAM(s) Inhalation every 12 hours  clopidogrel Tablet 75 milliGRAM(s) Oral daily  diltiazem    milliGRAM(s) Oral daily  enoxaparin Injectable 40 milliGRAM(s) SubCutaneous every 24 hours  finasteride 5 milliGRAM(s) Oral daily  furosemide    Tablet 40 milliGRAM(s) Oral once  melatonin 3 milliGRAM(s) Oral at bedtime PRN  ondansetron Injectable 4 milliGRAM(s) IV Push every 8 hours PRN  predniSONE   Tablet 10 milliGRAM(s) Oral daily  senna 2 Tablet(s) Oral at bedtime  tamsulosin 0.4 milliGRAM(s) Oral at bedtime      LABS:                        14.5   7.42  )-----------( 180      ( 06 Oct 2024 07:41 )             46.9       Hemoglobin: 14.5 g/dL (10-06 @ 07:41)  Hemoglobin: 15.9 g/dL (10-04 @ 06:10)  Hemoglobin: 13.2 g/dL (10-02 @ 05:55)      10-06    143  |  108[H]  |  22  ----------------------------<  121[H]  5.1   |  22  |  0.81    Ca    9.4      06 Oct 2024 07:41  Phos  3.7     10-06  Mg     2.20     10-06      Creatinine Trend: 0.81<--, 0.65<--, 0.74<--, 0.73<--, 0.86<--, 0.78<--    COAGS:           PHYSICAL EXAM:  T(C): 36.4 (10-06-24 @ 12:11), Max: 36.7 (10-06-24 @ 06:02)  HR: 65 (10-06-24 @ 12:11) (63 - 68)  BP: 106/59 (10-06-24 @ 12:11) (106/59 - 144/81)  RR: 18 (10-06-24 @ 12:11) (18 - 18)  SpO2: 100% (10-06-24 @ 12:11) (99% - 100%)  Wt(kg): --    I&O's Summary      General: NAD  Head: Normocephalic and atraumatic.   Neck: No JVD. No bruits. Supple. Does not appear to be enlarged.   Cardiovascular: + S1,S2 ; RRR Soft systolic murmur at the left lower sternal border. No rubs noted.    Lungs: CTA b/l. No rhonchi, rales or wheezes.   Abdomen: + BS, soft. Non tender. Non distended. No rebound. No guarding.   Extremities: No clubbing/cyanosis/edema.   Neurologic: Moves all four extremities. Full range of motion.   Skin: Warm and moist. The patient's skin has normal elasticity and good skin turgor.   Psychiatric: cannot eval  Musculoskeletal: Normal range of motion, normal strength    DATA    tele- NSR 50s      ECG:  	AF 140s    < from: TTE W or WO Ultrasound Enhancing Agent (09.26.24 @ 15:20) >     CONCLUSIONS:      1. Left ventricular systolic function is normal with an ejection fraction of 69 % by Alex's method ofdisks.   2. Enlarged right ventricular cavity size and reduced right ventricular systolic function.   3. Left atrium is normal in size.   4. The aortic valve appears trileaflet with reduced systolic excursion. There is moderate aortic stenosis. The peak transaortic velocity is 2.37 m/s, peak transaortic gradient is 22.5 mmHg and mean transaortic gradient is 11.0 mmHg with an LVOT/aortic valve VTI ratio of 0.39. The aortic valve area is estimated at 1.21 cm² by the continuity equation.   5. Estimated pulmonary artery systolic pressure is 57 mmHg, consistent with moderate pulmonary hypertension.   6. No pericardial effusion seen.  < end of copied text >      ASSESSMENT/PLAN: 	  89M with PMH cognitive dysfunction (AAOx2 at baseline), advanced COPD on daily prednisone with chronic respiratory failure on 4L NC, CAD/MI s/p stent 15 years ago, HLD, and BPH BIBEMS from [facility] for confusion in setting of suspected pneumonia. Cardiology consulted for New onset Rapid Afib /?flutter today    --TTE noted with Normal LVEF, mod AS, mod Pulm HTN  --remains in SR on cardizem CD 120mg daily  --Despite [id number] of atleast 3, given AMS/agitation/lung mass, hold off on full Ac for now  --Ct head/chest noted--family does not want malignancy w/u, spoke with daughter states that he is fairly bedbound over last couple months and needs help with ADLs also reports that since his mental status has been waxing and waning would prefer to hold of AC for now and its bleeding risk    Hyun Cooper MD  Pager: [phone]

## 2024-10-06 NOTE — PROGRESS NOTE ADULT - SUBJECTIVE AND OBJECTIVE BOX
EP     HISTORY OF PRESENT ILLNESS: HPI:  89M with PMH cognitive dysfunction (AAOx2 at baseline), advanced COPD on daily prednisone with chronic respiratory failure on 4L NC, CAD/MI s/p stent, HLD, and BPH BIBEMS from [facility] for confusion in setting of suspected pneumonia. Pt does not know why he was brought to the hospital, unable to provide much history. Collateral obtained from daughter/HCP Meghan Gonzalez. Pt noted to have intermittent episodes of confusion by NH staff over the past 2 days, occasionally become agitated, saying nonsensical things, all unlike him. Pt is AAOx2 at baseline, would not know the year, but otherwise conversational. Whenever he was visited by daughter or son though, he was found to be at his baseline. He had a UA done that was unremarkable and a CXR that showed "pulmonary vascular congestion with patchy bibasilar infriltrates"- copy in pt's chart. And he was started on doxycycline yesterday, unclear how many doses he took. Pt with a chronic cough, did not notice anything like increased cough or sputum production from baseline. No witnessed episodes of shortness of breath, no increase in baseline oxygen requirement. No known fevers. Meghan believes patient has a history of heart failure and takes lasix regularly, however, heart failure not listed as a diagnosis from NH or [facility] outpatient pulm notes, lasix also not listed on medication list from NH or surescripts.   Pt himself currently feels well. Denies having any trouble breathing, chest pain, abdominal pain, or any other complaints.     In ED:  On arrivalL afebrile, hemodynamically stable, saturating 88% on 4L NC, briefly required 6L but now weaned back down to 4%, saturating >90%.   Labs notable for leukocytosis to 12k, elevated proBNP 3351, VBG with pH 7.37/50, lactate 2.6  UA negative for infection  CXR with reticular opacities over bibasilar lungs on prelim read    Given CTX and azithromycin.   Admitted to medicine for further evaluation.  (25 Sep 2024 22:23)       acetaminophen     Tablet .. 650 milliGRAM(s) Oral every 6 hours PRN  albuterol/ipratropium for Nebulization 3 milliLiter(s) Nebulizer every 6 hours PRN  albuterol/ipratropium for Nebulization. 3 milliLiter(s) Nebulizer once  aluminum hydroxide/magnesium hydroxide/simethicone Suspension 30 milliLiter(s) Oral every 4 hours PRN  atorvastatin 10 milliGRAM(s) Oral at bedtime  buDESOnide    Inhalation Suspension 0.5 milliGRAM(s) Inhalation every 12 hours  clopidogrel Tablet 75 milliGRAM(s) Oral daily  diltiazem    milliGRAM(s) Oral daily  enoxaparin Injectable 40 milliGRAM(s) SubCutaneous every 24 hours  finasteride 5 milliGRAM(s) Oral daily  furosemide    Tablet 40 milliGRAM(s) Oral once  melatonin 3 milliGRAM(s) Oral at bedtime PRN  ondansetron Injectable 4 milliGRAM(s) IV Push every 8 hours PRN  predniSONE   Tablet 10 milliGRAM(s) Oral daily  senna 2 Tablet(s) Oral at bedtime  tamsulosin 0.4 milliGRAM(s) Oral at bedtime                            14.5   7.42  )-----------( 180      ( 06 Oct 2024 07:41 )             46.9       Hemoglobin: 14.5 g/dL (10-06 @ 07:41)  Hemoglobin: 15.9 g/dL (10-04 @ 06:10)  Hemoglobin: 13.2 g/dL (10-02 @ 05:55)            Creatinine Trend: 0.65<--, 0.74<--, 0.73<--, 0.86<--, 0.78<--, 0.88<--    COAGS:           T(C): 36.7 (10-06-24 @ 06:02), Max: 36.7 (10-06-24 @ 06:02)  HR: 67 (10-06-24 @ 07:22) (63 - 68)  BP: 128/55 (10-06-24 @ 06:02) (107/57 - 144/81)  RR: 18 (10-06-24 @ 06:02) (18 - 18)  SpO2: 100% (10-06-24 @ 07:22) (95% - 100%)  Wt(kg): --    I&O's Summary    10/3- resting in bed, no new complaints.  10/4- resting in bed, no angina or palpitations.  10/5 pt in bed, he offers no complaints   Date of service  10/6 no new complaints        Appearance: elderly man, sleepy and confused.	  HEENT:   Normal oral mucosa, PERRL, EOMI	  Lymphatic: No lymphadenopathy , no edema  Cardiovascular: Normal S1 S2, No JVD, No murmurs , Peripheral pulses palpable 2+ bilaterally  Respiratory: Lungs clear to auscultation, normal effort 	  Gastrointestinal:  Soft, Non-tender, + BS	  Skin: No rashes, No ecchymoses, No cyanosis, warm to touch  Musculoskeletal: Normal range of motion, normal strength  Psychiatry:  Mood & affect appropriate    TELEMETRY: AFib/AFlutter	    Echo: < from: TTE W or WO Ultrasound Enhancing Agent (09.26.24 @ 15:20) >     1. Left ventricular systolic function is normal with an ejection fraction of 69 % by Alex's method ofdisks.   2. Enlargd right ventricular cavity size and reduced right ventricular systolic function.   3. Left atrium is normal in size.   4. The aortic valve appears trileaflet with reduced systolic excursion. There is moderate aortic stenosis. The peak transaortic velocity is 2.37 m/s, peak transaortic gradient is 22.5 mmHg and mean transaortic gradient is 11.0 mmHg with an LVOT/aortic valve VTI ratio of 0.39. The aortic valve area is estimated at 1.21 cm² by the continuity equation.   5. Estimated pulmonary artery systolic pressure is 57 mmHg, consistent with moderate pulmonary hypertension.   6. No pericardial effusion seen.    < end of copied text >  	  ASSESSMENT/PLAN: Mr Barajas is a 89y Male here from nursing home w/ shortness of breath.  Has COPD, may have superimposed pneumonia.  New dx of atrial arrhythmia, may be causing acute diastolic heart failure.  Continue AV aniket blockade w/ diltiazem.  Heartrate is adequately controlled now.  Backup plan is a Digoxin load.  Awaiting remainder of workup and goals of care before starting oral anticoagulation for stroke prevention.

## 2024-10-06 NOTE — PROGRESS NOTE ADULT - ASSESSMENT
89M with PMH cognitive dysfunction (AAOx2 at baseline), advanced COPD on daily prednisone with chronic respiratory failure on 4L NC, CAD/MI s/p stent, HLD, and BPH BIBEMS from [facility] for confusion in setting of suspected pneumonia. Pt does not know why he was brought to the hospital, unable to provide much history. Collateral obtained from daughter/HCP Meghan Gonzalez. Pt noted to have intermittent episodes of confusion by NH staff over the past 2 days, occasionally become agitated, saying nonsensical things, all unlike him. Pt is AAOx2 at baseline, would not know the year, but otherwise conversational. Whenever he was visited by daughter or son though, he was found to be at his baseline. He had a UA done that was unremarkable and a CXR that showed "pulmonary vascular congestion with patchy bibasilar infriltrates"- copy in pt's chart. And he was started on doxycycline yesterday, unclear how many doses he took. Pt with a chronic cough, did not notice anything like increased cough or sputum production from baseline. No witnessed episodes of shortness of breath, no increase in baseline oxygen requirement. No known fevers. Meghan believes patient has a history of heart failure and takes lasix regularly, however, heart failure not listed as a diagnosis from NH or [facility] outpatient pulm notes, lasix also not listed on medication list from NH or surescripts.   Pt himself currently feels well. Denies having any trouble breathing, chest pain, abdominal pain, or any other complaints.     Acute metabolic encephalopathy.   -check repeat CT head -NOT DONE YET;  WAS CANCELLED   - could be related to infection?  pneumonia,  uti etc:  being rule dout  - his VBG is fine with no retention of co2:  so thats not the reason of him getting more confused;     9/29;  -he is still confused?  baseline not sure;   -he is not wheezing;  awaiting ct chest   -heis still on 5 L:  try to bring down ; sao2 gaol is 88%; ex smoker     9/30: he is still  pretty confused;    -on chr prednisone for copd;   he is not wheezing    10/1; seems to be doing  ok ;on 4 L of oxygen :  10/2: on 4 L of oxygen ; try to minimise o x as tolerated  10/3:  called daughter : she is aware of him having malignancy probably:   -on prednisone  -daughter does not to work up for malignancy  :  10/4:  -he remains OK;  les acitated today  ;  alert and awke:   no resp distress:  no wheezing:  for malignancy as above   10/5  -he seems comfortable:   -he is on 6 L of oxygen : need to decrease:  the goal of o2 saturation is arround 885 in his copd pt: he does not need this much of oxygen    -cont BD   -he is on chr 10 mg of prednisone  -he is delirious too :   10/6:  seems OK:  still on 5 L : despite the goal of saturation is 88%: had dw acp yesterday:  I believe the oxygen can still be lowered to 2 L per minute and further  cont BD     Mild leukocytosis to 12k on admission  - infectious vs reactive. afebrile, not septic, hemodynamically stable.   -Without any new fevers,   -URI sxs, worsening of chronic cough, or increased O2 requirements  - Procal negative.  - cw abx as per ID  ; to cont for now;   -needs ct chest     9/29; -on no antibiotics   -afebrile today  ;  wbc is normal today     9/30: remains afebrile and wbc is normal   10/1: seems OK:   10/2: cont current care:   10/4:  leucocytosis resolved;   10/5:l resolved   10/6  seems eryn :  no sob:  on 5 L of oxygen      Advanced COPD.   -ow suspicion for COPD exacerbation, lung exam with localized rhonchi/coarse crackles over R>L base. No increased baseline O2 requirement or worsening of chronic cough  - continue home prednisone 10mg daily   - duonebs PRN.  -and his VBg also did not show any  significant retention ofpco2:     9/30:  -cont current rx:  he seems stable:   -ct chest showed; Extensive emphysematous changes. Bibasilar predominant peripheral reticular opacities, traction bronchiectasis, and subpleural cystic changes, compatible with   interstitial lung disease. Right lung pulmonary nodules, measuring up to 1.8 cm, concerning for neoplasia.  Consider PET scan or 3 month follow up. Small left pleural effusion with basilar pleural thickening. Partially imaged left parotid gland mass. Consider further evaluation with contrast-enhanced MR neck from more definitive characterization.    10/1:  dw daughter : she will decide about the pulm nodule:  and has parotid gland tumor ; work up per primary team     10/3: no workup per daughter   10/4:  pulm wise he seems stable:  no sob:  no wheezing   10/5: need to decrease the oxygen as documented above   10/6:  no wheezing:   no co2 retention  cont BD     Chronic hypoxic respiratory failure.   ·  Plan: - continue home NC at 4L NC, goal 88-92&-likely due to copd   10/2; seems to be doing  ok :   10/03: cont 4 L of NC :  he is not wheezing:  vbg ordered   10/04;-VBG is very good:   10/5: VBG reasonable  no co2 retention:  reduce oxygen    10/6 : try to decrease oxygen down to 1 L  per minute or even off     CAD (coronary artery disease).    - continue home statin, plavix.    -NEW aflutter   -echo showed;  1. Left ventricular systolic function is normal with an ejection fraction of 69 % by Alex's method of disks.   2. Enlarged right ventricular cavity size and reduced right ventricular systolic function.   3. Left atrium is normal in size.   4. The aortic valve appears trileaflet with reduced systolic excursion. There is moderate aortic stenosis. The peak transaortic velocity is 2.37 m/s, peak transaortic gradient is 22.5 mmHg and mean transaortic gradient is 11.0 mmHg with an LVOT/aortic valve VTI ratio of 0.39. The aortic valve area is estimated at 1.21 cm² by the continuity equation.   5. Estimated pulmonary artery systolic pressure is 57 mmHg, consistent with moderate pulmonary hypertension.  defer to cards     10/5L converted back to NSR : daughter refused for AC:        yancy acp

## 2024-10-07 RX ADMIN — ENOXAPARIN SODIUM 40 MILLIGRAM(S): 150 INJECTION SUBCUTANEOUS at 12:31

## 2024-10-07 RX ADMIN — Medication 3 MILLIGRAM(S): at 21:00

## 2024-10-07 RX ADMIN — PREDNISONE 10 MILLIGRAM(S): 5 TABLET ORAL at 06:24

## 2024-10-07 RX ADMIN — Medication 0.4 MILLIGRAM(S): at 21:00

## 2024-10-07 RX ADMIN — Medication 75 MILLIGRAM(S): at 12:31

## 2024-10-07 RX ADMIN — Medication 2 TABLET(S): at 21:00

## 2024-10-07 RX ADMIN — FINASTERIDE 5 MILLIGRAM(S): 5 TABLET, FILM COATED ORAL at 12:31

## 2024-10-07 RX ADMIN — Medication 120 MILLIGRAM(S): at 06:23

## 2024-10-07 RX ADMIN — ATORVASTATIN CALCIUM 10 MILLIGRAM(S): 10 TABLET, FILM COATED ORAL at 21:00

## 2024-10-07 NOTE — PROGRESS NOTE ADULT - SUBJECTIVE AND OBJECTIVE BOX
DATE OF SERVICE: 10-07-24    No events    MEDICATIONS:  acetaminophen     Tablet .. 650 milliGRAM(s) Oral every 6 hours PRN  albuterol/ipratropium for Nebulization 3 milliLiter(s) Nebulizer every 6 hours PRN  aluminum hydroxide/magnesium hydroxide/simethicone Suspension 30 milliLiter(s) Oral every 4 hours PRN  atorvastatin 10 milliGRAM(s) Oral at bedtime  buDESOnide    Inhalation Suspension 0.5 milliGRAM(s) Inhalation every 12 hours  clopidogrel Tablet 75 milliGRAM(s) Oral daily  diltiazem    milliGRAM(s) Oral daily  enoxaparin Injectable 40 milliGRAM(s) SubCutaneous every 24 hours  finasteride 5 milliGRAM(s) Oral daily  furosemide    Tablet 40 milliGRAM(s) Oral once  melatonin 3 milliGRAM(s) Oral at bedtime PRN  ondansetron Injectable 4 milliGRAM(s) IV Push every 8 hours PRN  predniSONE   Tablet 10 milliGRAM(s) Oral daily  senna 2 Tablet(s) Oral at bedtime  tamsulosin 0.4 milliGRAM(s) Oral at bedtime                          14.5   7.42  )-----------( 180      ( 06 Oct 2024 07:41 )             46.9       143  |  108[H]  |  22  ----------------------------<  121[H]  5.1   |  22  |  0.81    Ca    9.4      06 Oct 2024 07:41  Phos  3.7     10-06  Mg     2.20     10-06      T(C): 36.6 (10-07-24 @ 11:47), Max: 36.7 (10-06-24 @ 21:53)  HR: 71 (10-07-24 @ 11:47) (60 - 71)  BP: 103/61 (10-07-24 @ 11:47) (103/61 - 133/77)  RR: 17 (10-07-24 @ 11:47) (17 - 18)  SpO2: 100% (10-07-24 @ 11:47) (100% - 100%)  Wt(kg): --    General: NAD  Head: Normocephalic and atraumatic.   Neck: No JVD. No bruits. Supple. Does not appear to be enlarged.   Cardiovascular: + S1,S2 ; RRR Soft systolic murmur at the left lower sternal border. No rubs noted.    Lungs: CTA b/l. No rhonchi, rales or wheezes.   Abdomen: + BS, soft. Non tender. Non distended. No rebound. No guarding.   Extremities: No clubbing/cyanosis/edema.   Neurologic: Moves all four extremities. Full range of motion.   Skin: Warm and moist. The patient's skin has normal elasticity and good skin turgor.   Psychiatric: cannot eval  Musculoskeletal: Normal range of motion, normal strength    DATA    tele- NSR 50s      ECG:  	AF 140s    < from: TTE W or WO Ultrasound Enhancing Agent (09.26.24 @ 15:20) >     CONCLUSIONS:      1. Left ventricular systolic function is normal with an ejection fraction of 69 % by Alex's method ofdisks.   2. Enlarged right ventricular cavity size and reduced right ventricular systolic function.   3. Left atrium is normal in size.   4. The aortic valve appears trileaflet with reduced systolic excursion. There is moderate aortic stenosis. The peak transaortic velocity is 2.37 m/s, peak transaortic gradient is 22.5 mmHg and mean transaortic gradient is 11.0 mmHg with an LVOT/aortic valve VTI ratio of 0.39. The aortic valve area is estimated at 1.21 cm² by the continuity equation.   5. Estimated pulmonary artery systolic pressure is 57 mmHg, consistent with moderate pulmonary hypertension.   6. No pericardial effusion seen.  < end of copied text >      ASSESSMENT/PLAN: 	  89M with PMH cognitive dysfunction (AAOx2 at baseline), advanced COPD on daily prednisone with chronic respiratory failure on 4L NC, CAD/MI s/p stent 15 years ago, HLD, and BPH BIBEMS from [facility] for confusion in setting of suspected pneumonia. Cardiology consulted for New onset Rapid Afib /?flutter today    --TTE noted with Normal LVEF, mod AS, mod Pulm HTN  --remains in SR on cardizem CD 120mg daily  --Despite [id number] of atleast 3, given AMS/agitation/lung mass, hold off on full Ac for now  --Ct head/chest noted--family does not want malignancy w/u, spoke with daughter states that he is fairly bedbound over last couple months and needs help with ADLs also reports that since his mental status has been waxing and waning would prefer to hold of AC for now and its bleeding risk    Jessica VAZQUEZ  [phone]

## 2024-10-07 NOTE — PROGRESS NOTE ADULT - ASSESSMENT
89M with PMH cognitive dysfunction (AAOx2 at baseline), advanced COPD on daily prednisone with chronic respiratory failure on 4L NC, CAD/MI s/p stent, HLD, and BPH BIBEMS from [facility] for confusion x 2 days. Chest imaging suggestive of pulmonary edema vs pneumonia.         Problem/Plan - 1:  ·  Problem: Acute metabolic encephalopathy.   -likely worsening dementia   all c/s neg   being treated for PNA  - frequent reorientation-    Problem/Plan - 2:·  Problem: Leukocytosis.   resolved   ID following       Problem/Plan - 3:·  Problem: Advanced COPD.   ·  Plan: low suspicion for COPD exacerbation, lung exam with localized rhonchi/coarse crackles over R>L base. No increased baseline O2 requirement or worsening of chronic cough  - continue home prednisone 10mg daily   - duonebs PRN.  pul following   still on 5 L n/c , taper as tolerated     Problem/Plan - 4:  ·  Problem: lung nodule : could be malignancy  ·  Plan: - CT reviewed  - Family does not want w/u or treatment   - family wants comfort measures only     Problem/Plan - 5:  ·  Problem: CAD (coronary artery disease).   ·  Plan: - continue home statin, plavix.    Problem/Plan - 6:  ·  Problem: BPH (benign prostatic hyperplasia).   ·  Plan: - continue home finasteride and tamsulosin.    Problem/Plan - 7:  ·  Problem: new aflutter   ·  Plan: cw tele  started on cardizem   echo : nml EF , likely diastolic failuire   seen by EP cardiology   HR is controlled   not candidate for any AC : 2/2 agitation and fall risk     case dw daughter

## 2024-10-07 NOTE — PROGRESS NOTE ADULT - SUBJECTIVE AND OBJECTIVE BOX
Date of Service: 10-07-24 @ 10:40    Patient is a 89y old  Male who presents with a chief complaint of acute metabolic encephalopathy (06 Oct 2024 16:08)      Any change in ROS: seems to be doing  ok ; alert and awake today  : calm    on 4 Lof oxygen      MEDICATIONS  (STANDING):  atorvastatin 10 milliGRAM(s) Oral at bedtime  buDESOnide    Inhalation Suspension 0.5 milliGRAM(s) Inhalation every 12 hours  clopidogrel Tablet 75 milliGRAM(s) Oral daily  diltiazem    milliGRAM(s) Oral daily  enoxaparin Injectable 40 milliGRAM(s) SubCutaneous every 24 hours  finasteride 5 milliGRAM(s) Oral daily  furosemide    Tablet 40 milliGRAM(s) Oral once  predniSONE   Tablet 10 milliGRAM(s) Oral daily  senna 2 Tablet(s) Oral at bedtime  tamsulosin 0.4 milliGRAM(s) Oral at bedtime    MEDICATIONS  (PRN):  acetaminophen     Tablet .. 650 milliGRAM(s) Oral every 6 hours PRN Temp greater or equal to 38C (100.4F), Mild Pain (1 - 3)  albuterol/ipratropium for Nebulization 3 milliLiter(s) Nebulizer every 6 hours PRN Shortness of Breath and/or Wheezing  aluminum hydroxide/magnesium hydroxide/simethicone Suspension 30 milliLiter(s) Oral every 4 hours PRN Dyspepsia  melatonin 3 milliGRAM(s) Oral at bedtime PRN Insomnia  ondansetron Injectable 4 milliGRAM(s) IV Push every 8 hours PRN Nausea and/or Vomiting    Vital Signs Last 24 Hrs  T(C): 36.7 (07 Oct 2024 06:20), Max: 36.7 (06 Oct 2024 21:53)  T(F): 98.1 (07 Oct 2024 06:20), Max: 98.1 (07 Oct 2024 06:20)  HR: 60 (07 Oct 2024 06:20) (60 - 66)  BP: 128/58 (07 Oct 2024 06:20) (106/59 - 133/77)  BP(mean): --  RR: 18 (07 Oct 2024 06:20) (18 - 18)  SpO2: 100% (07 Oct 2024 06:20) (100% - 100%)    Parameters below as of 07 Oct 2024 06:20  Patient On (Oxygen Delivery Method): nasal cannula  O2 Flow (L/min): 4      I&O's Summary        Physical Exam:   GENERAL: NAD, well-groomed, well-developed  HEENT: SUGEY/   Atraumatic, Normocephalic  ENMT: No tonsillar erythema, exudates, or enlargement; Moist mucous membranes, Good dentition, No lesions  NECK: Supple, No JVD, Normal thyroid  CHEST/LUNG: Clear to ausculation minimal rhodochrous chris sounds   CVS: Regular rate and rhythm; No murmurs, rubs, or gallops  GI: : Soft, Nontender, Nondistended; Bowel sounds present  NERVOUS SYSTEM:  Alert & Oriented X0  EXTREMITIES:  -edema  LYMPH: No lymphadenopathy noted  SKIN: No rashes or lesions  ENDOCRINOLOGY: No Thyromegaly  PSYCH: calm     Labs:  27                            14.5   7.42  )-----------( 180      ( 06 Oct 2024 07:41 )             46.9                         15.9   9.45  )-----------( 213      ( 04 Oct 2024 06:10 )             48.6     10-06    143  |  108[H]  |  22  ----------------------------<  121[H]  5.1   |  22  |  0.81  10-04    144  |  105  |  12  ----------------------------<  130[H]  4.0   |  23  |  0.65    Ca    9.4      06 Oct 2024 07:41  Phos  3.7     10-06  Mg     2.20     10-06      CAPILLARY BLOOD GLUCOSE        rad< from: CT Chest No Cont (09.29.24 @ 13:10) >  HEART: Heart size is normal. No pericardial effusion.    VISUALIZED UPPER ABDOMEN: A 6.0 cm hepatic dome cyst. Cholecystectomy.    CHEST WALL AND BONES: Degenerative changes of the thoracic spine. A 9 mm   right thyroid lobe hypoattenuating nodule (2-39). 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    IMPRESSION:    Extensive emphysematous changes.    Bibasilar predominant peripheral reticular opacities, traction   bronchiectasis, and subpleural cystic changes, compatible with   interstitial lung disease.    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  Consider PET scan or 3 month follow up.    Small left pleural effusion with basilar pleural thickening.    Partially imaged left parotid gland mass. Consider further evaluation   with contrast-enhanced MR neck from more definitive characterization.    --- End of Report ---    < end of copied text >        Urinalysis Basic - ( 06 Oct 2024 07:41 )    Color: x / Appearance: x / SG: x / pH: x  Gluc: 121 mg/dL / Ketone: x  / Bili: x / Urobili: x   Blood: x / Protein: x / Nitrite: x   Leuk Esterase: x / RBC: x / WBC x   Sq Epi: x / Non Sq Epi: x / Bacteria: x            RECENT CULTURES:        RESPIRATORY CULTURES:          Studies  Chest X-RAY  CT SCAN Chest   Venous Dopplers: LE:   CT Abdomen  Others

## 2024-10-07 NOTE — PROGRESS NOTE ADULT - SUBJECTIVE AND OBJECTIVE BOX
Patient is a 89y old  Male who presents with a chief complaint of acute metabolic encephalopathy (07 Oct 2024 13:47)    Date of servie : 10-07-24 @ 14:12  INTERVAL HPI/OVERNIGHT EVENTS:  T(C): 36.6 (10-07-24 @ 11:47), Max: 36.7 (10-06-24 @ 21:53)  HR: 71 (10-07-24 @ 11:47) (60 - 71)  BP: 103/61 (10-07-24 @ 11:47) (103/61 - 133/77)  RR: 17 (10-07-24 @ 11:47) (17 - 18)  SpO2: 100% (10-07-24 @ 11:47) (100% - 100%)  Wt(kg): --  I&O's Summary      LABS:                        14.5   7.42  )-----------( 180      ( 06 Oct 2024 07:41 )             46.9     10-06    143  |  108[H]  |  22  ----------------------------<  121[H]  5.1   |  22  |  0.81    Ca    9.4      06 Oct 2024 07:41  Phos  3.7     10-06  Mg     2.20     10-06        Urinalysis Basic - ( 06 Oct 2024 07:41 )    Color: x / Appearance: x / SG: x / pH: x  Gluc: 121 mg/dL / Ketone: x  / Bili: x / Urobili: x   Blood: x / Protein: x / Nitrite: x   Leuk Esterase: x / RBC: x / WBC x   Sq Epi: x / Non Sq Epi: x / Bacteria: x      CAPILLARY BLOOD GLUCOSE            Urinalysis Basic - ( 06 Oct 2024 07:41 )    Color: x / Appearance: x / SG: x / pH: x  Gluc: 121 mg/dL / Ketone: x  / Bili: x / Urobili: x   Blood: x / Protein: x / Nitrite: x   Leuk Esterase: x / RBC: x / WBC x   Sq Epi: x / Non Sq Epi: x / Bacteria: x        MEDICATIONS  (STANDING):  atorvastatin 10 milliGRAM(s) Oral at bedtime  buDESOnide    Inhalation Suspension 0.5 milliGRAM(s) Inhalation every 12 hours  clopidogrel Tablet 75 milliGRAM(s) Oral daily  diltiazem    milliGRAM(s) Oral daily  enoxaparin Injectable 40 milliGRAM(s) SubCutaneous every 24 hours  finasteride 5 milliGRAM(s) Oral daily  furosemide    Tablet 40 milliGRAM(s) Oral once  predniSONE   Tablet 10 milliGRAM(s) Oral daily  senna 2 Tablet(s) Oral at bedtime  tamsulosin 0.4 milliGRAM(s) Oral at bedtime    MEDICATIONS  (PRN):  acetaminophen     Tablet .. 650 milliGRAM(s) Oral every 6 hours PRN Temp greater or equal to 38C (100.4F), Mild Pain (1 - 3)  albuterol/ipratropium for Nebulization 3 milliLiter(s) Nebulizer every 6 hours PRN Shortness of Breath and/or Wheezing  aluminum hydroxide/magnesium hydroxide/simethicone Suspension 30 milliLiter(s) Oral every 4 hours PRN Dyspepsia  melatonin 3 milliGRAM(s) Oral at bedtime PRN Insomnia  ondansetron Injectable 4 milliGRAM(s) IV Push every 8 hours PRN Nausea and/or Vomiting          PHYSICAL EXAM:  GENERAL: NAD, well-groomed, well-developed  HEAD:  Atraumatic, Normocephalic  CHEST/LUNG: Clear to percussion bilaterally; No rales, rhonchi, wheezing, or rubs  HEART: Regular rate and rhythm; No murmurs, rubs, or gallops  ABDOMEN: Soft, Nontender, Nondistended; Bowel sounds present  EXTREMITIES:  edema +    Care Discussed with Consultants/Other Providers [x ] YES  [ ] NO

## 2024-10-07 NOTE — PROGRESS NOTE ADULT - ASSESSMENT
89M with PMH cognitive dysfunction (AAOx2 at baseline), advanced COPD on daily prednisone with chronic respiratory failure on 4L NC, CAD/MI s/p stent, HLD, and BPH BIBEMS from [facility] for confusion in setting of suspected pneumonia. Pt does not know why he was brought to the hospital, unable to provide much history. Collateral obtained from daughter/HCP Meghan Gonzalez. Pt noted to have intermittent episodes of confusion by NH staff over the past 2 days, occasionally become agitated, saying nonsensical things, all unlike him. Pt is AAOx2 at baseline, would not know the year, but otherwise conversational. Whenever he was visited by daughter or son though, he was found to be at his baseline. He had a UA done that was unremarkable and a CXR that showed "pulmonary vascular congestion with patchy bibasilar infriltrates"- copy in pt's chart. And he was started on doxycycline yesterday, unclear how many doses he took. Pt with a chronic cough, did not notice anything like increased cough or sputum production from baseline. No witnessed episodes of shortness of breath, no increase in baseline oxygen requirement. No known fevers. Meghan believes patient has a history of heart failure and takes lasix regularly, however, heart failure not listed as a diagnosis from NH or [facility] outpatient pulm notes, lasix also not listed on medication list from NH or surescripts.   Pt himself currently feels well. Denies having any trouble breathing, chest pain, abdominal pain, or any other complaints.     Acute metabolic encephalopathy.   -check repeat CT head -NOT DONE YET;  WAS CANCELLED   - could be related to infection?  pneumonia,  uti etc:  being rule dout  - his VBG is fine with no retention of co2:  so thats not the reason of him getting more confused;     9/29;  -he is still confused?  baseline not sure;   -he is not wheezing;  awaiting ct chest   -heis still on 5 L:  try to bring down ; sao2 gaol is 88%; ex smoker     9/30: he is still  pretty confused;    -on chr prednisone for copd;   he is not wheezing    10/1; seems to be doing  ok ;on 4 L of oxygen :  10/2: on 4 L of oxygen ; try to minimise o x as tolerated  10/3:  called daughter : she is aware of him having malignancy probably:   -on prednisone  -daughter does not to work up for malignancy  :  10/4:  -he remains OK;  les acitated today  ;  alert and awke:   no resp distress:  no wheezing:  for malignancy as above   10/5  -he seems comfortable:   -he is on 6 L of oxygen : need to decrease:  the goal of o2 saturation is arround 885 in his copd pt: he does not need this much of oxygen    -cont BD   -he is on chr 10 mg of prednisone  -he is delirious too :   10/6:  seems OK:  still on 5 L : despite the goal of saturation is 88%: had dw acp yesterday:  I believe the oxygen can still be lowered to 2 L per minute and further  cont BD     Mild leukocytosis to 12k on admission  - infectious vs reactive. afebrile, not septic, hemodynamically stable.   -Without any new fevers,   -URI sxs, worsening of chronic cough, or increased O2 requirements  - Procal negative.  - cw abx as per ID  ; to cont for now;   -needs ct chest     9/29; -on no antibiotics   -afebrile today  ;  wbc is normal today     9/30: remains afebrile and wbc is normal   10/1: seems OK:   10/2: cont current care:   10/4:  leucocytosis resolved;   10/5:l resolved   10/6  seems eryn :  no sob:  on 5 L of oxygen    10/7;  -today on 4 L:  still satting good;  100%    Advanced COPD.   -ow suspicion for COPD exacerbation, lung exam with localized rhonchi/coarse crackles over R>L base. No increased baseline O2 requirement or worsening of chronic cough  - continue home prednisone 10mg daily   - duonebs PRN.  -and his VBg also did not show any  significant retention ofpco2:     9/30:  -cont current rx:  he seems stable:   -ct chest showed; Extensive emphysematous changes. Bibasilar predominant peripheral reticular opacities, traction bronchiectasis, and subpleural cystic changes, compatible with   interstitial lung disease. Right lung pulmonary nodules, measuring up to 1.8 cm, concerning for neoplasia.  Consider PET scan or 3 month follow up. Small left pleural effusion with basilar pleural thickening. Partially imaged left parotid gland mass. Consider further evaluation with contrast-enhanced MR neck from more definitive characterization.    10/1:  dw daughter : she will decide about the pulm nodule:  and has parotid gland tumor ; work up per primary team     10/3: no workup per daughter   10/4:  pulm wise he seems stable:  no sob:  no wheezing   10/5: need to decrease the oxygen as documented above   10/6:  no wheezing:   no co2 retention  cont BD   10/7;  cont BD:  no wheezing:      Chronic hypoxic respiratory failure.   ·  Plan: - continue home NC at 4L NC, goal 88-92&-likely due to copd   10/2; seems to be doing  ok :   10/03: cont 4 L of NC :  he is not wheezing:  vbg ordered   10/04;-VBG is very good:   10/5: VBG reasonable  no co2 retention:  reduce oxygen    10/6 : try to decrease oxygen down to 1 L  per minute or even off   10/7; cont to wean off oxygen      CAD (coronary artery disease).    - continue home statin, plavix.    -NEW aflutter   -echo showed;  1. Left ventricular systolic function is normal with an ejection fraction of 69 % by Alex's method of disks.   2. Enlarged right ventricular cavity size and reduced right ventricular systolic function.   3. Left atrium is normal in size.   4. The aortic valve appears trileaflet with reduced systolic excursion. There is moderate aortic stenosis. The peak transaortic velocity is 2.37 m/s, peak transaortic gradient is 22.5 mmHg and mean transaortic gradient is 11.0 mmHg with an LVOT/aortic valve VTI ratio of 0.39. The aortic valve area is estimated at 1.21 cm² by the continuity equation.   5. Estimated pulmonary artery systolic pressure is 57 mmHg, consistent with moderate pulmonary hypertension.  defer to cards     10/5L converted back to NSR : daughter refused for AC:        yancy acp

## 2024-10-07 NOTE — PROGRESS NOTE ADULT - SUBJECTIVE AND OBJECTIVE BOX
EP     HISTORY OF PRESENT ILLNESS: HPI:  89M with PMH cognitive dysfunction (AAOx2 at baseline), advanced COPD on daily prednisone with chronic respiratory failure on 4L NC, CAD/MI s/p stent, HLD, and BPH BIBEMS from [facility] for confusion in setting of suspected pneumonia. Pt does not know why he was brought to the hospital, unable to provide much history. Collateral obtained from daughter/HCP Meghan Gonzalez. Pt noted to have intermittent episodes of confusion by NH staff over the past 2 days, occasionally become agitated, saying nonsensical things, all unlike him. Pt is AAOx2 at baseline, would not know the year, but otherwise conversational. Whenever he was visited by daughter or son though, he was found to be at his baseline. He had a UA done that was unremarkable and a CXR that showed "pulmonary vascular congestion with patchy bibasilar infriltrates"- copy in pt's chart. And he was started on doxycycline yesterday, unclear how many doses he took. Pt with a chronic cough, did not notice anything like increased cough or sputum production from baseline. No witnessed episodes of shortness of breath, no increase in baseline oxygen requirement. No known fevers. Meghan believes patient has a history of heart failure and takes lasix regularly, however, heart failure not listed as a diagnosis from NH or [facility] outpatient pulm notes, lasix also not listed on medication list from NH or surescripts.   Pt himself currently feels well. Denies having any trouble breathing, chest pain, abdominal pain, or any other complaints.     Date of service  10/7 no new complaints      acetaminophen     Tablet .. 650 milliGRAM(s) Oral every 6 hours PRN  albuterol/ipratropium for Nebulization 3 milliLiter(s) Nebulizer every 6 hours PRN  aluminum hydroxide/magnesium hydroxide/simethicone Suspension 30 milliLiter(s) Oral every 4 hours PRN  atorvastatin 10 milliGRAM(s) Oral at bedtime  buDESOnide    Inhalation Suspension 0.5 milliGRAM(s) Inhalation every 12 hours  clopidogrel Tablet 75 milliGRAM(s) Oral daily  diltiazem    milliGRAM(s) Oral daily  enoxaparin Injectable 40 milliGRAM(s) SubCutaneous every 24 hours  finasteride 5 milliGRAM(s) Oral daily  furosemide    Tablet 40 milliGRAM(s) Oral once  melatonin 3 milliGRAM(s) Oral at bedtime PRN  ondansetron Injectable 4 milliGRAM(s) IV Push every 8 hours PRN  predniSONE   Tablet 10 milliGRAM(s) Oral daily  senna 2 Tablet(s) Oral at bedtime  tamsulosin 0.4 milliGRAM(s) Oral at bedtime                            14.5   7.42  )-----------( 180      ( 06 Oct 2024 07:41 )             46.9       10-06    143  |  108[H]  |  22  ----------------------------<  121[H]  5.1   |  22  |  0.81    Ca    9.4      06 Oct 2024 07:41  Phos  3.7     10-06  Mg     2.20     10-06    T(C): 36.6 (10-07-24 @ 11:47), Max: 36.7 (10-06-24 @ 21:53)  HR: 71 (10-07-24 @ 11:47) (60 - 71)  BP: 103/61 (10-07-24 @ 11:47) (103/61 - 133/77)  RR: 17 (10-07-24 @ 11:47) (17 - 18)  SpO2: 100% (10-07-24 @ 11:47) (100% - 100%)  Wt(kg): --    I&O's Summary    Appearance: elderly man, sleepy and confused.	  HEENT:   Normal oral mucosa, PERRL, EOMI	  Lymphatic: No lymphadenopathy , no edema  Cardiovascular: Normal S1 S2, No JVD, No murmurs , Peripheral pulses palpable 2+ bilaterally  Respiratory: Lungs clear to auscultation, normal effort 	  Gastrointestinal:  Soft, Non-tender, + BS	  Skin: No rashes, No ecchymoses, No cyanosis, warm to touch  Musculoskeletal: Normal range of motion, normal strength  Psychiatry:  Mood & affect appropriate    TELEMETRY: AFib/AFlutter	    Echo: < from: TTE W or WO Ultrasound Enhancing Agent (09.26.24 @ 15:20) >     1. Left ventricular systolic function is normal with an ejection fraction of 69 % by Alex's method ofdisks.   2. Enlargd right ventricular cavity size and reduced right ventricular systolic function.   3. Left atrium is normal in size.   4. The aortic valve appears trileaflet with reduced systolic excursion. There is moderate aortic stenosis. The peak transaortic velocity is 2.37 m/s, peak transaortic gradient is 22.5 mmHg and mean transaortic gradient is 11.0 mmHg with an LVOT/aortic valve VTI ratio of 0.39. The aortic valve area is estimated at 1.21 cm² by the continuity equation.   5. Estimated pulmonary artery systolic pressure is 57 mmHg, consistent with moderate pulmonary hypertension.   6. No pericardial effusion seen.    < end of copied text >  	  ASSESSMENT/PLAN: Mr Barajas is a 89y Male here from nursing home w/ shortness of breath.  Has COPD, may have superimposed pneumonia.  New dx of atrial arrhythmia, may be causing acute diastolic heart failure.  Continue AV aniket blockade w/ diltiazem.  Heartrate is adequately controlled now.  Backup plan is a Digoxin load.  Awaiting remainder of workup and goals of care before starting oral anticoagulation for stroke prevention.      Tarik Marin M.D.  Cardiac Electrophysiology  [phone]

## 2024-10-08 RX ADMIN — Medication 3 MILLIGRAM(S): at 20:25

## 2024-10-08 RX ADMIN — ATORVASTATIN CALCIUM 10 MILLIGRAM(S): 10 TABLET, FILM COATED ORAL at 20:26

## 2024-10-08 RX ADMIN — ENOXAPARIN SODIUM 40 MILLIGRAM(S): 150 INJECTION SUBCUTANEOUS at 11:13

## 2024-10-08 RX ADMIN — Medication 0.4 MILLIGRAM(S): at 20:26

## 2024-10-08 RX ADMIN — PREDNISONE 10 MILLIGRAM(S): 5 TABLET ORAL at 06:03

## 2024-10-08 RX ADMIN — Medication 2 TABLET(S): at 20:26

## 2024-10-08 RX ADMIN — Medication 120 MILLIGRAM(S): at 06:03

## 2024-10-08 RX ADMIN — FINASTERIDE 5 MILLIGRAM(S): 5 TABLET, FILM COATED ORAL at 11:13

## 2024-10-08 RX ADMIN — Medication 75 MILLIGRAM(S): at 11:14

## 2024-10-08 NOTE — PROGRESS NOTE ADULT - SUBJECTIVE AND OBJECTIVE BOX
EP     HISTORY OF PRESENT ILLNESS: HPI:  89M with PMH cognitive dysfunction (AAOx2 at baseline), advanced COPD on daily prednisone with chronic respiratory failure on 4L NC, CAD/MI s/p stent, HLD, and BPH BIBEMS from [facility] for confusion in setting of suspected pneumonia. Pt does not know why he was brought to the hospital, unable to provide much history. Collateral obtained from daughter/HCP Meghan Gonzalez. Pt noted to have intermittent episodes of confusion by NH staff over the past 2 days, occasionally become agitated, saying nonsensical things, all unlike him. Pt is AAOx2 at baseline, would not know the year, but otherwise conversational. Whenever he was visited by daughter or son though, he was found to be at his baseline. He had a UA done that was unremarkable and a CXR that showed "pulmonary vascular congestion with patchy bibasilar infriltrates"- copy in pt's chart. And he was started on doxycycline yesterday, unclear how many doses he took. Pt with a chronic cough, did not notice anything like increased cough or sputum production from baseline. No witnessed episodes of shortness of breath, no increase in baseline oxygen requirement. No known fevers. Meghan believes patient has a history of heart failure and takes lasix regularly, however, heart failure not listed as a diagnosis from NH or [facility] outpatient pulm notes, lasix also not listed on medication list from NH or surescripts.   Pt himself currently feels well. Denies having any trouble breathing, chest pain, abdominal pain, or any other complaints.     Date of service  10/8 no new complaints      acetaminophen     Tablet .. 650 milliGRAM(s) Oral every 6 hours PRN  albuterol/ipratropium for Nebulization 3 milliLiter(s) Nebulizer every 6 hours PRN  aluminum hydroxide/magnesium hydroxide/simethicone Suspension 30 milliLiter(s) Oral every 4 hours PRN  atorvastatin 10 milliGRAM(s) Oral at bedtime  buDESOnide    Inhalation Suspension 0.5 milliGRAM(s) Inhalation every 12 hours  clopidogrel Tablet 75 milliGRAM(s) Oral daily  diltiazem    milliGRAM(s) Oral daily  enoxaparin Injectable 40 milliGRAM(s) SubCutaneous every 24 hours  finasteride 5 milliGRAM(s) Oral daily  furosemide    Tablet 40 milliGRAM(s) Oral once  melatonin 3 milliGRAM(s) Oral at bedtime PRN  ondansetron Injectable 4 milliGRAM(s) IV Push every 8 hours PRN  predniSONE   Tablet 10 milliGRAM(s) Oral daily  senna 2 Tablet(s) Oral at bedtime  tamsulosin 0.4 milliGRAM(s) Oral at bedtime      T(C): 37 (10-08-24 @ 06:00), Max: 37 (10-08-24 @ 06:00)  HR: 82 (10-08-24 @ 06:00) (71 - 82)  BP: 121/82 (10-08-24 @ 06:00) (103/61 - 135/65)  RR: 18 (10-08-24 @ 06:00) (17 - 18)  SpO2: 97% (10-08-24 @ 06:00) (94% - 100%)  Wt(kg): --    I&O's Summary    Appearance: elderly man, sleepy and confused.	  HEENT:   Normal oral mucosa, PERRL, EOMI	  Lymphatic: No lymphadenopathy , no edema  Cardiovascular: Normal S1 S2, No JVD, No murmurs , Peripheral pulses palpable 2+ bilaterally  Respiratory: Lungs clear to auscultation, normal effort 	  Gastrointestinal:  Soft, Non-tender, + BS	  Skin: No rashes, No ecchymoses, No cyanosis, warm to touch  Musculoskeletal: Normal range of motion, normal strength  Psychiatry:  Mood & affect appropriate    TELEMETRY: AFib/AFlutter	    Echo: < from: TTE W or WO Ultrasound Enhancing Agent (09.26.24 @ 15:20) >     1. Left ventricular systolic function is normal with an ejection fraction of 69 % by Alex's method ofdisks.   2. Enlargd right ventricular cavity size and reduced right ventricular systolic function.   3. Left atrium is normal in size.   4. The aortic valve appears trileaflet with reduced systolic excursion. There is moderate aortic stenosis. The peak transaortic velocity is 2.37 m/s, peak transaortic gradient is 22.5 mmHg and mean transaortic gradient is 11.0 mmHg with an LVOT/aortic valve VTI ratio of 0.39. The aortic valve area is estimated at 1.21 cm² by the continuity equation.   5. Estimated pulmonary artery systolic pressure is 57 mmHg, consistent with moderate pulmonary hypertension.   6. No pericardial effusion seen.    < end of copied text >  	  ASSESSMENT/PLAN: Mr Barajas is a 89y Male here from nursing home w/ shortness of breath.  Has COPD, may have superimposed pneumonia.  New dx of atrial arrhythmia, may be causing acute diastolic heart failure.  Continue AV aniket blockade w/ diltiazem.  Heartrate is adequately controlled now.  Backup plan is a Digoxin load.  Awaiting remainder of workup and goals of care before starting oral anticoagulation for stroke prevention.      Tarik Marin M.D.  Cardiac Electrophysiology  [phone]

## 2024-10-08 NOTE — PROGRESS NOTE ADULT - SUBJECTIVE AND OBJECTIVE BOX
Date of Service: 10-08-24 @ 10:20    Patient is a 89y old  Male who presents with a chief complaint of acute metabolic encephalopathy (07 Oct 2024 14:12)      Any change in ROS: he seems pretty good:   no sob:   confused:   on 2 L of oxygen      MEDICATIONS  (STANDING):  atorvastatin 10 milliGRAM(s) Oral at bedtime  buDESOnide    Inhalation Suspension 0.5 milliGRAM(s) Inhalation every 12 hours  clopidogrel Tablet 75 milliGRAM(s) Oral daily  diltiazem    milliGRAM(s) Oral daily  enoxaparin Injectable 40 milliGRAM(s) SubCutaneous every 24 hours  finasteride 5 milliGRAM(s) Oral daily  furosemide    Tablet 40 milliGRAM(s) Oral once  predniSONE   Tablet 10 milliGRAM(s) Oral daily  senna 2 Tablet(s) Oral at bedtime  tamsulosin 0.4 milliGRAM(s) Oral at bedtime    MEDICATIONS  (PRN):  acetaminophen     Tablet .. 650 milliGRAM(s) Oral every 6 hours PRN Temp greater or equal to 38C (100.4F), Mild Pain (1 - 3)  albuterol/ipratropium for Nebulization 3 milliLiter(s) Nebulizer every 6 hours PRN Shortness of Breath and/or Wheezing  aluminum hydroxide/magnesium hydroxide/simethicone Suspension 30 milliLiter(s) Oral every 4 hours PRN Dyspepsia  melatonin 3 milliGRAM(s) Oral at bedtime PRN Insomnia  ondansetron Injectable 4 milliGRAM(s) IV Push every 8 hours PRN Nausea and/or Vomiting    Vital Signs Last 24 Hrs  T(C): 37 (08 Oct 2024 06:00), Max: 37 (08 Oct 2024 06:00)  T(F): 98.6 (08 Oct 2024 06:00), Max: 98.6 (08 Oct 2024 06:00)  HR: 82 (08 Oct 2024 06:00) (71 - 82)  BP: 121/82 (08 Oct 2024 06:00) (103/61 - 135/65)  BP(mean): --  RR: 18 (08 Oct 2024 06:00) (17 - 18)  SpO2: 97% (08 Oct 2024 06:00) (94% - 100%)    Parameters below as of 08 Oct 2024 06:00  Patient On (Oxygen Delivery Method): nasal cannula  O2 Flow (L/min): 4      I&O's Summary        Physical Exam:   GENERAL: NAD, well-groomed, well-developed  HEENT: SUGEY/   Atraumatic, Normocephalic  ENMT: No tonsillar erythema, exudates, or enlargement; Moist mucous membranes, Good dentition, No lesions  NECK: Supple, No JVD, Normal thyroid  CHEST/LUNG: Clear to auscultaion-no wheezing  CVS: Regular rate and rhythm; No murmurs, rubs, or gallops  GI: : Soft, Nontender, Nondistended; Bowel sounds present  NERVOUS SYSTEM:  Alert & Oriented X1  EXTREMITIES:  2+ Peripheral Pulses, No clubbing, cyanosis, or edema  LYMPH: No lymphadenopathy noted  SKIN: No rashes or lesions  ENDOCRINOLOGY: No Thyromegaly  PSYCH: confused     Labs:  27                            14.5   7.42  )-----------( 180      ( 06 Oct 2024 07:41 )             46.9     10-06    143  |  108[H]  |  22  ----------------------------<  121[H]  5.1   |  22  |  0.81        CAPILLARY BLOOD GLUCOSE              < from: CT Chest No Cont (09.29.24 @ 13:10) >    AIRWAYS/LUNGS/PLEURA: Patent central airways. Extensive emphysematous   changes. Bilateral peripheral reticular opacities, traction   bronchiectasis, and subpleural cystic changes with basilar predominance.   A 1.0 cm right upper lobe pulmonary nodule (2-72), a 1.4 cm right middle   lobe pulmonary nodule (2-111), and a 1.8 cm right lower lobe pulmonary   nodule (2-121). Small left pleural effusion and left basilar pleural   thickening.    MEDIASTINUM AND AJ: No lymphadenopathy.    VESSELS: Aortic calcifications. Coronary artery calcifications.    HEART: Heart size is normal. No pericardial effusion.    VISUALIZED UPPER ABDOMEN: A 6.0 cm hepatic dome cyst. Cholecystectomy.    CHEST WALL AND BONES: Degenerative changes of the thoracic spine. A 9 mm   right thyroid lobe hypoattenuating nodule (2-39). 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    IMPRESSION:    Extensive emphysematous changes.    Bibasilar predominant peripheral reticular opacities, traction   bronchiectasis, and subpleural cystic changes, compatible with   interstitial lung disease.    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  Consider PET scan or 3 month follow up.    Small left pleural effusion with basilar pleural thickening.    Partially imaged left parotid gland mass. Consider further evaluation   with contrast-enhanced MR neck from more definitive characterization.    --- End of Report ---      < end of copied text >          RECENT CULTURES:        RESPIRATORY CULTURES:          Studies  Chest X-RAY  CT SCAN Chest   Venous Dopplers: LE:   CT Abdomen  Others

## 2024-10-08 NOTE — PROGRESS NOTE ADULT - SUBJECTIVE AND OBJECTIVE BOX
DATE OF SERVICE: 10-08-24    No events overnight    MEDICATIONS:  acetaminophen     Tablet .. 650 milliGRAM(s) Oral every 6 hours PRN  albuterol/ipratropium for Nebulization 3 milliLiter(s) Nebulizer every 6 hours PRN  aluminum hydroxide/magnesium hydroxide/simethicone Suspension 30 milliLiter(s) Oral every 4 hours PRN  atorvastatin 10 milliGRAM(s) Oral at bedtime  buDESOnide    Inhalation Suspension 0.5 milliGRAM(s) Inhalation every 12 hours  clopidogrel Tablet 75 milliGRAM(s) Oral daily  diltiazem    milliGRAM(s) Oral daily  enoxaparin Injectable 40 milliGRAM(s) SubCutaneous every 24 hours  finasteride 5 milliGRAM(s) Oral daily  furosemide    Tablet 40 milliGRAM(s) Oral once  melatonin 3 milliGRAM(s) Oral at bedtime PRN  ondansetron Injectable 4 milliGRAM(s) IV Push every 8 hours PRN  predniSONE   Tablet 10 milliGRAM(s) Oral daily  senna 2 Tablet(s) Oral at bedtime  tamsulosin 0.4 milliGRAM(s) Oral at bedtime    LABS:    Hemoglobin: 14.5 g/dL (10-06 @ 07:41)  Hemoglobin: 15.9 g/dL (10-04 @ 06:10)    Creatinine Trend: 0.81<--, 0.65<--, 0.74<--, 0.73<--, 0.86<--, 0.78<--    PHYSICAL EXAM:  T(C): 37 (10-08-24 @ 06:00), Max: 37 (10-08-24 @ 06:00)  HR: 82 (10-08-24 @ 06:00) (71 - 82)  BP: 121/82 (10-08-24 @ 06:00) (103/61 - 135/65)  RR: 18 (10-08-24 @ 06:00) (17 - 18)  SpO2: 97% (10-08-24 @ 06:00) (94% - 100%)  Wt(kg): --    I&O's Summary    General: NAD  Head: Normocephalic and atraumatic.   Neck: No JVD. No bruits. Supple. Does not appear to be enlarged.   Cardiovascular: + S1,S2 ; RRR Soft systolic murmur at the left lower sternal border. No rubs noted.    Lungs: CTA b/l. No rhonchi, rales or wheezes.   Abdomen: + BS, soft. Non tender. Non distended. No rebound. No guarding.   Extremities: No clubbing/cyanosis/edema.   Neurologic: Moves all four extremities. Full range of motion.   Skin: Warm and moist. The patient's skin has normal elasticity and good skin turgor.   Psychiatric: cannot eval  Musculoskeletal: Normal range of motion, normal strength    DATA    tele- NSR 50s      ECG:  	AF 140s    < from: TTE W or WO Ultrasound Enhancing Agent (09.26.24 @ 15:20) >     CONCLUSIONS:      1. Left ventricular systolic function is normal with an ejection fraction of 69 % by Alex's method ofdisks.   2. Enlarged right ventricular cavity size and reduced right ventricular systolic function.   3. Left atrium is normal in size.   4. The aortic valve appears trileaflet with reduced systolic excursion. There is moderate aortic stenosis. The peak transaortic velocity is 2.37 m/s, peak transaortic gradient is 22.5 mmHg and mean transaortic gradient is 11.0 mmHg with an LVOT/aortic valve VTI ratio of 0.39. The aortic valve area is estimated at 1.21 cm² by the continuity equation.   5. Estimated pulmonary artery systolic pressure is 57 mmHg, consistent with moderate pulmonary hypertension.   6. No pericardial effusion seen.  < end of copied text >      ASSESSMENT/PLAN: 	  89M with PMH cognitive dysfunction (AAOx2 at baseline), advanced COPD on daily prednisone with chronic respiratory failure on 4L NC, CAD/MI s/p stent 15 years ago, HLD, and BPH BIBEMS from [facility] for confusion in setting of suspected pneumonia. Cardiology consulted for New onset Rapid Afib /?flutter today    --TTE noted with Normal LVEF, mod AS, mod Pulm HTN  --remains in SR on cardizem CD 120mg daily  --Despite [id number] of atleast 3, given AMS/agitation/lung mass, hold off on full Ac for now  --Ct head/chest noted--family does not want malignancy w/u, spoke with daughter states that he is fairly bedbound over last couple months and needs help with ADLs also reports that since his mental status has been waxing and waning would prefer to hold of AC for now and its bleeding risk    Hyun Cooper MD  Pager: [phone]

## 2024-10-08 NOTE — PROGRESS NOTE ADULT - SUBJECTIVE AND OBJECTIVE BOX
Patient is a 89y old  Male who presents with a chief complaint of acute metabolic encephalopathy (08 Oct 2024 11:13)    Date of servie : 10-08-24 @ 16:19  INTERVAL HPI/OVERNIGHT EVENTS:  T(C): 36.4 (10-08-24 @ 12:07), Max: 37 (10-08-24 @ 06:00)  HR: 67 (10-08-24 @ 12:07) (67 - 82)  BP: 111/47 (10-08-24 @ 12:07) (111/47 - 135/65)  RR: 19 (10-08-24 @ 12:07) (18 - 19)  SpO2: 96% (10-08-24 @ 12:07) (94% - 97%)  Wt(kg): --  I&O's Summary      LABS:              CAPILLARY BLOOD GLUCOSE                MEDICATIONS  (STANDING):  atorvastatin 10 milliGRAM(s) Oral at bedtime  buDESOnide    Inhalation Suspension 0.5 milliGRAM(s) Inhalation every 12 hours  clopidogrel Tablet 75 milliGRAM(s) Oral daily  diltiazem    milliGRAM(s) Oral daily  enoxaparin Injectable 40 milliGRAM(s) SubCutaneous every 24 hours  finasteride 5 milliGRAM(s) Oral daily  furosemide    Tablet 40 milliGRAM(s) Oral once  predniSONE   Tablet 10 milliGRAM(s) Oral daily  senna 2 Tablet(s) Oral at bedtime  tamsulosin 0.4 milliGRAM(s) Oral at bedtime    MEDICATIONS  (PRN):  acetaminophen     Tablet .. 650 milliGRAM(s) Oral every 6 hours PRN Temp greater or equal to 38C (100.4F), Mild Pain (1 - 3)  albuterol/ipratropium for Nebulization 3 milliLiter(s) Nebulizer every 6 hours PRN Shortness of Breath and/or Wheezing  aluminum hydroxide/magnesium hydroxide/simethicone Suspension 30 milliLiter(s) Oral every 4 hours PRN Dyspepsia  melatonin 3 milliGRAM(s) Oral at bedtime PRN Insomnia  ondansetron Injectable 4 milliGRAM(s) IV Push every 8 hours PRN Nausea and/or Vomiting          PHYSICAL EXAM:  GENERAL: NAD, well-groomed, well-developed  HEAD:  Atraumatic, Normocephalic  CHEST/LUNG: Clear to percussion bilaterally; No rales, rhonchi, wheezing, or rubs  HEART: Regular rate and rhythm; No murmurs, rubs, or gallops  ABDOMEN: Soft, Nontender, Nondistended; Bowel sounds present  EXTREMITIES:  2+ Peripheral Pulses, No clubbing, cyanosis, or edema  LYMPH: No lymphadenopathy noted  SKIN: No rashes or lesions    Care Discussed with Consultants/Other Providers [ ] YES  [ ] NO

## 2024-10-08 NOTE — PROGRESS NOTE ADULT - ASSESSMENT
89M with PMH cognitive dysfunction (AAOx2 at baseline), advanced COPD on daily prednisone with chronic respiratory failure on 4L NC, CAD/MI s/p stent, HLD, and BPH BIBEMS from [facility] for confusion x 2 days. Chest imaging suggestive of pulmonary edema vs pneumonia.         Problem/Plan - 1:  ·  Problem: Acute metabolic encephalopathy.   -likely worsening dementia   - frequent reorientation-    Problem/Plan - 2:·  Problem: Leukocytosis.   resolved   ID following       Problem/Plan - 3:·  Problem: Advanced COPD. ·  Plan: low suspicion for COPD exacerbation, lung exam with localized rhonchi/coarse crackles over R>L base. No increased baseline O2 requirement or worsening of chronic cough  - continue home prednisone 10mg daily   - duonebs PRN.  pul following   still on 5 L n/c , taper as tolerated     Problem/Plan - 4:  ·  Problem: lung nodule : could be malignancy  ·  Plan: - CT reviewed  - Family does not want w/u or treatment   - family wants comfort measures only     Problem/Plan - 5:  ·  Problem: CAD (coronary artery disease).   ·  Plan: - continue home statin, plavix.    Problem/Plan - 6:  ·  Problem: BPH (benign prostatic hyperplasia).   ·  Plan: - continue home finasteride and tamsulosin.    Problem/Plan - 7:  ·  Problem: new aflutter   ·  Plan: cw tele  started on cardizem   echo : nml EF , likely diastolic failuire   seen by EP cardiology   HR is controlled   not candidate for any AC : 2/2 agitation and fall risk     dw sw, awaiting LTC

## 2024-10-08 NOTE — PROGRESS NOTE ADULT - ASSESSMENT
89M with PMH cognitive dysfunction (AAOx2 at baseline), advanced COPD on daily prednisone with chronic respiratory failure on 4L NC, CAD/MI s/p stent, HLD, and BPH BIBEMS from [facility] for confusion in setting of suspected pneumonia. Pt does not know why he was brought to the hospital, unable to provide much history. Collateral obtained from daughter/HCP Meghan Gonzalez. Pt noted to have intermittent episodes of confusion by NH staff over the past 2 days, occasionally become agitated, saying nonsensical things, all unlike him. Pt is AAOx2 at baseline, would not know the year, but otherwise conversational. Whenever he was visited by daughter or son though, he was found to be at his baseline. He had a UA done that was unremarkable and a CXR that showed "pulmonary vascular congestion with patchy bibasilar infriltrates"- copy in pt's chart. And he was started on doxycycline yesterday, unclear how many doses he took. Pt with a chronic cough, did not notice anything like increased cough or sputum production from baseline. No witnessed episodes of shortness of breath, no increase in baseline oxygen requirement. No known fevers. Meghan believes patient has a history of heart failure and takes lasix regularly, however, heart failure not listed as a diagnosis from NH or [facility] outpatient pulm notes, lasix also not listed on medication list from NH or surescripts.   Pt himself currently feels well. Denies having any trouble breathing, chest pain, abdominal pain, or any other complaints.     Acute metabolic encephalopathy.   -check repeat CT head -NOT DONE YET;  WAS CANCELLED   - could be related to infection?  pneumonia,  uti etc:  being rule dout  - his VBG is fine with no retention of co2:  so thats not the reason of him getting more confused;     9/29;  -he is still confused?  baseline not sure;   -he is not wheezing;  awaiting ct chest   -heis still on 5 L:  try to bring down ; sao2 gaol is 88%; ex smoker     9/30: he is still  pretty confused;    -on chr prednisone for copd;   he is not wheezing    10/1; seems to be doing  ok ;on 4 L of oxygen :  10/2: on 4 L of oxygen ; try to minimise o x as tolerated  10/3:  called daughter : she is aware of him having malignancy probably:   -on prednisone  -daughter does not to work up for malignancy  :  10/4:  -he remains OK;  les acitated today  ;  alert and awke:   no resp distress:  no wheezing:  for malignancy as above   10/5  -he seems comfortable:   -he is on 6 L of oxygen : need to decrease:  the goal of o2 saturation is arround 885 in his copd pt: he does not need this much of oxygen    -cont BD   -he is on chr 10 mg of prednisone  -he is delirious too :   10/6:  seems OK:  still on 5 L : despite the goal of saturation is 88%: had dw acp yesterday:  I believe the oxygen can still be lowered to 2 L per minute and further  cont BD   10/8:  -he is on baseline of home oxygen requirement at 4 L per minute:   he is not agitated and is not in any resp distress:  cont current therapy:     Mild leukocytosis to 12k on admission  - infectious vs reactive. afebrile, not septic, hemodynamically stable.   -Without any new fevers,   -URI sxs, worsening of chronic cough, or increased O2 requirements  - Procal negative.  - cw abx as per ID  ; to cont for now;   -needs ct chest     9/29; -on no antibiotics   -afebrile today  ;  wbc is normal today     9/30: remains afebrile and wbc is normal   10/1: seems OK:   10/2: cont current care:   10/4:  leucocytosis resolved;   10/5:l resolved   10/6  seems eryn :  no sob:  on 5 L of oxygen    10/7;  -today on 4 L:  still satting good;  100%  10/8: cont 4 L of oxygen : no sob:  no cough :     Advanced COPD.   -ow suspicion for COPD exacerbation, lung exam with localized rhonchi/coarse crackles over R>L base. No increased baseline O2 requirement or worsening of chronic cough  - continue home prednisone 10mg daily   - duonebs PRN.  -and his VBg also did not show any  significant retention ofpco2:     9/30:  -cont current rx:  he seems stable:   -ct chest showed; Extensive emphysematous changes. Bibasilar predominant peripheral reticular opacities, traction bronchiectasis, and subpleural cystic changes, compatible with   interstitial lung disease. Right lung pulmonary nodules, measuring up to 1.8 cm, concerning for neoplasia.  Consider PET scan or 3 month follow up. Small left pleural effusion with basilar pleural thickening. Partially imaged left parotid gland mass. Consider further evaluation with contrast-enhanced MR neck from more definitive characterization.    10/1:  dw daughter : she will decide about the pulm nodule:  and has parotid gland tumor ; work up per primary team     10/3: no workup per daughter   10/4:  pulm wise he seems stable:  no sob:  no wheezing   10/5: need to decrease the oxygen as documented above   10/6:  no wheezing:   no co2 retention  cont BD   10/7;  cont BD:  no wheezing:    10/8:  -remains stable:  pulm wise:   -he likely has malignant nodule:  daughter is aware:  probably would not do any further workup : but can repeat his ct scan chest in 6 weeks time     Chronic hypoxic respiratory failure.   ·  Plan: - continue home NC at 4L NC, goal 88-92&-likely due to copd   10/2; seems to be doing  ok :   10/03: cont 4 L of NC :  he is not wheezing:  vbg ordered   10/04;-VBG is very good:   10/5: VBG reasonable  no co2 retention:  reduce oxygen    10/6 : try to decrease oxygen down to 1 L  per minute or even off   10/7; cont to wean off oxygen    10/8: he is on baseline of 4 L of oxygen      CAD (coronary artery disease).    - continue home statin, plavix.    -NEW aflutter   -echo showed;  1. Left ventricular systolic function is normal with an ejection fraction of 69 % by Alex's method of disks.   2. Enlarged right ventricular cavity size and reduced right ventricular systolic function.   3. Left atrium is normal in size.   4. The aortic valve appears trileaflet with reduced systolic excursion. There is moderate aortic stenosis. The peak transaortic velocity is 2.37 m/s, peak transaortic gradient is 22.5 mmHg and mean transaortic gradient is 11.0 mmHg with an LVOT/aortic valve VTI ratio of 0.39. The aortic valve area is estimated at 1.21 cm² by the continuity equation.   5. Estimated pulmonary artery systolic pressure is 57 mmHg, consistent with moderate pulmonary hypertension.  defer to cards     10/5L converted back to NSR : daughter refused for AC:        dw acp

## 2024-10-09 RX ADMIN — ENOXAPARIN SODIUM 40 MILLIGRAM(S): 150 INJECTION SUBCUTANEOUS at 11:38

## 2024-10-09 RX ADMIN — Medication 120 MILLIGRAM(S): at 04:57

## 2024-10-09 RX ADMIN — FINASTERIDE 5 MILLIGRAM(S): 5 TABLET, FILM COATED ORAL at 11:38

## 2024-10-09 RX ADMIN — Medication 3 MILLIGRAM(S): at 20:51

## 2024-10-09 RX ADMIN — PREDNISONE 10 MILLIGRAM(S): 5 TABLET ORAL at 04:57

## 2024-10-09 RX ADMIN — ATORVASTATIN CALCIUM 10 MILLIGRAM(S): 10 TABLET, FILM COATED ORAL at 20:51

## 2024-10-09 RX ADMIN — Medication 0.5 MILLIGRAM(S): at 09:52

## 2024-10-09 RX ADMIN — Medication 0.4 MILLIGRAM(S): at 20:51

## 2024-10-09 RX ADMIN — Medication 2 TABLET(S): at 20:52

## 2024-10-09 RX ADMIN — Medication 75 MILLIGRAM(S): at 11:37

## 2024-10-09 NOTE — PROGRESS NOTE ADULT - SUBJECTIVE AND OBJECTIVE BOX
DATE OF SERVICE: 10-09-24    No overnight events,     MEDICATIONS:  acetaminophen     Tablet .. 650 milliGRAM(s) Oral every 6 hours PRN  albuterol/ipratropium for Nebulization 3 milliLiter(s) Nebulizer every 6 hours PRN  aluminum hydroxide/magnesium hydroxide/simethicone Suspension 30 milliLiter(s) Oral every 4 hours PRN  atorvastatin 10 milliGRAM(s) Oral at bedtime  buDESOnide    Inhalation Suspension 0.5 milliGRAM(s) Inhalation every 12 hours  clopidogrel Tablet 75 milliGRAM(s) Oral daily  diltiazem    milliGRAM(s) Oral daily  enoxaparin Injectable 40 milliGRAM(s) SubCutaneous every 24 hours  finasteride 5 milliGRAM(s) Oral daily  furosemide    Tablet 40 milliGRAM(s) Oral once  melatonin 3 milliGRAM(s) Oral at bedtime PRN  ondansetron Injectable 4 milliGRAM(s) IV Push every 8 hours PRN  predniSONE   Tablet 10 milliGRAM(s) Oral daily  senna 2 Tablet(s) Oral at bedtime  tamsulosin 0.4 milliGRAM(s) Oral at bedtime      LABS:  Hemoglobin: 14.5 g/dL (10-06 @ 07:41)  Creatinine Trend: 0.81<--, 0.65<--, 0.74<--, 0.73<--, 0.86<--, 0.78<--      PHYSICAL EXAM:  T(C): 36.7 (10-09-24 @ 04:45), Max: 36.7 (10-08-24 @ 17:09)  HR: 88 (10-09-24 @ 10:02) (67 - 90)  BP: 112/64 (10-09-24 @ 04:45) (111/47 - 130/73)  RR: 18 (10-09-24 @ 04:45) (16 - 19)  SpO2: 98% (10-09-24 @ 10:02) (96% - 98%)  Wt(kg): --    I&O's Summary    08 Oct 2024 07:01  -  09 Oct 2024 07:00  --------------------------------------------------------  IN: 0 mL / OUT: 200 mL / NET: -200 mL        General: NAD  Head: Normocephalic and atraumatic.   Neck: No JVD. No bruits. Supple. Does not appear to be enlarged.   Cardiovascular: + S1,S2 ; RRR Soft systolic murmur at the left lower sternal border. No rubs noted.    Lungs: CTA b/l. No rhonchi, rales or wheezes.   Abdomen: + BS, soft. Non tender. Non distended. No rebound. No guarding.   Extremities: No clubbing/cyanosis/edema.   Neurologic: Moves all four extremities. Full range of motion.   Skin: Warm and moist. The patient's skin has normal elasticity and good skin turgor.   Psychiatric: cannot eval  Musculoskeletal: Normal range of motion, normal strength    DATA    tele- NSR 50s      ECG:  	AF 140s    < from: TTE W or WO Ultrasound Enhancing Agent (09.26.24 @ 15:20) >     CONCLUSIONS:      1. Left ventricular systolic function is normal with an ejection fraction of 69 % by Alex's method ofdisks.   2. Enlarged right ventricular cavity size and reduced right ventricular systolic function.   3. Left atrium is normal in size.   4. The aortic valve appears trileaflet with reduced systolic excursion. There is moderate aortic stenosis. The peak transaortic velocity is 2.37 m/s, peak transaortic gradient is 22.5 mmHg and mean transaortic gradient is 11.0 mmHg with an LVOT/aortic valve VTI ratio of 0.39. The aortic valve area is estimated at 1.21 cm² by the continuity equation.   5. Estimated pulmonary artery systolic pressure is 57 mmHg, consistent with moderate pulmonary hypertension.   6. No pericardial effusion seen.  < end of copied text >      ASSESSMENT/PLAN: 	  89M with PMH cognitive dysfunction (AAOx2 at baseline), advanced COPD on daily prednisone with chronic respiratory failure on 4L NC, CAD/MI s/p stent 15 years ago, HLD, and BPH BIBEMS from [facility] for confusion in setting of suspected pneumonia. Cardiology consulted for New onset Rapid Afib /?flutter today    --TTE noted with Normal LVEF, mod AS, mod Pulm HTN  --remains in SR on cardizem CD 120mg daily  --Despite [id number] of atleast 3, given AMS/agitation/lung mass, hold off on full Ac for now  --Ct head/chest noted--family does not want malignancy w/u, spoke with daughter states that he is fairly bedbound over last couple months and needs help with ADLs also reports that since his mental status has been waxing and waning would prefer to hold of AC for now and its bleeding risk    Hyun Cooper MD  Pager: [phone]

## 2024-10-09 NOTE — PROGRESS NOTE ADULT - SUBJECTIVE AND OBJECTIVE BOX
Date of Service: 10-09-24 @ 12:04    Patient is a 89y old  Male who presents with a chief complaint of acute metabolic encephalopathy (09 Oct 2024 11:55)      Any change in ROS: seems  OK: 4 L of oxygen      MEDICATIONS  (STANDING):  atorvastatin 10 milliGRAM(s) Oral at bedtime  buDESOnide    Inhalation Suspension 0.5 milliGRAM(s) Inhalation every 12 hours  clopidogrel Tablet 75 milliGRAM(s) Oral daily  diltiazem    milliGRAM(s) Oral daily  enoxaparin Injectable 40 milliGRAM(s) SubCutaneous every 24 hours  finasteride 5 milliGRAM(s) Oral daily  furosemide    Tablet 40 milliGRAM(s) Oral once  predniSONE   Tablet 10 milliGRAM(s) Oral daily  senna 2 Tablet(s) Oral at bedtime  tamsulosin 0.4 milliGRAM(s) Oral at bedtime    MEDICATIONS  (PRN):  acetaminophen     Tablet .. 650 milliGRAM(s) Oral every 6 hours PRN Temp greater or equal to 38C (100.4F), Mild Pain (1 - 3)  albuterol/ipratropium for Nebulization 3 milliLiter(s) Nebulizer every 6 hours PRN Shortness of Breath and/or Wheezing  aluminum hydroxide/magnesium hydroxide/simethicone Suspension 30 milliLiter(s) Oral every 4 hours PRN Dyspepsia  melatonin 3 milliGRAM(s) Oral at bedtime PRN Insomnia  ondansetron Injectable 4 milliGRAM(s) IV Push every 8 hours PRN Nausea and/or Vomiting    Vital Signs Last 24 Hrs  T(C): 36.5 (09 Oct 2024 11:43), Max: 36.7 (08 Oct 2024 17:09)  T(F): 97.7 (09 Oct 2024 11:43), Max: 98 (08 Oct 2024 17:09)  HR: 75 (09 Oct 2024 11:43) (67 - 90)  BP: 118/61 (09 Oct 2024 11:43) (111/47 - 130/73)  BP(mean): --  RR: 18 (09 Oct 2024 11:43) (16 - 19)  SpO2: 98% (09 Oct 2024 11:43) (96% - 98%)    Parameters below as of 09 Oct 2024 11:43  Patient On (Oxygen Delivery Method): nasal cannula  O2 Flow (L/min): 4      I&O's Summary    08 Oct 2024 07:01  -  09 Oct 2024 07:00  --------------------------------------------------------  IN: 0 mL / OUT: 200 mL / NET: -200 mL          Physical Exam:   GENERAL: NAD, well-groomed, well-developed  HEENT: SUGEY/   Atraumatic, Normocephalic  ENMT: No tonsillar erythema, exudates, or enlargement; Moist mucous membranes, Good dentition, No lesions  NECK: Supple, No JVD, Normal thyroid  CHEST/LUNG: Clear to auscultaion- no wheezing  CVS: Regular rate and rhythm; No murmurs, rubs, or gallops  GI: : Soft, Nontender, Nondistended; Bowel sounds present  NERVOUS SYSTEM:  Alert & Oriented X1  EXTREMITIES:  - edema  LYMPH: No lymphadenopathy noted  SKIN: No rashes or lesions  ENDOCRINOLOGY: No Thyromegaly  PSYCH:calm     Labs:  27                            14.5   7.42  )-----------( 180      ( 06 Oct 2024 07:41 )             46.9     10-06    143  |  108[H]  |  22  ----------------------------<  121[H]  5.1   |  22  |  0.81        CAPILLARY BLOOD GLUCOSE                d< from: CT Chest No Cont (09.29.24 @ 13:10) >    IMPRESSION:    Extensive emphysematous changes.    Bibasilar predominant peripheral reticular opacities, traction   bronchiectasis, and subpleural cystic changes, compatible with   interstitial lung disease.    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  Consider PET scan or 3 month follow up.    Small left pleural effusion with basilar pleural thickening.    Partially imaged left parotid gland mass. Consider further evaluation   with contrast-enhanced MR neck from more definitive characterization.    --- End of Report ---    < end of copied text >        RECENT CULTURES:        RESPIRATORY CULTURES:          Studies  Chest X-RAY  CT SCAN Chest   Venous Dopplers: LE:   CT Abdomen  Others

## 2024-10-09 NOTE — PROGRESS NOTE ADULT - ASSESSMENT
89M with PMH cognitive dysfunction (AAOx2 at baseline), advanced COPD on daily prednisone with chronic respiratory failure on 4L NC, CAD/MI s/p stent, HLD, and BPH BIBEMS from [facility] for confusion in setting of suspected pneumonia. Pt does not know why he was brought to the hospital, unable to provide much history. Collateral obtained from daughter/HCP Meghan Gonzalez. Pt noted to have intermittent episodes of confusion by NH staff over the past 2 days, occasionally become agitated, saying nonsensical things, all unlike him. Pt is AAOx2 at baseline, would not know the year, but otherwise conversational. Whenever he was visited by daughter or son though, he was found to be at his baseline. He had a UA done that was unremarkable and a CXR that showed "pulmonary vascular congestion with patchy bibasilar infriltrates"- copy in pt's chart. And he was started on doxycycline yesterday, unclear how many doses he took. Pt with a chronic cough, did not notice anything like increased cough or sputum production from baseline. No witnessed episodes of shortness of breath, no increase in baseline oxygen requirement. No known fevers. Meghan believes patient has a history of heart failure and takes lasix regularly, however, heart failure not listed as a diagnosis from NH or [facility] outpatient pulm notes, lasix also not listed on medication list from NH or surescripts.   Pt himself currently feels well. Denies having any trouble breathing, chest pain, abdominal pain, or any other complaints.     Acute metabolic encephalopathy.   -check repeat CT head -NOT DONE YET;  WAS CANCELLED   - could be related to infection?  pneumonia,  uti etc:  being rule dout  - his VBG is fine with no retention of co2:  so thats not the reason of him getting more confused;     9/29;  -he is still confused?  baseline not sure;   -he is not wheezing;  awaiting ct chest   -heis still on 5 L:  try to bring down ; sao2 gaol is 88%; ex smoker     9/30: he is still  pretty confused;    -on chr prednisone for copd;   he is not wheezing    10/1; seems to be doing  ok ;on 4 L of oxygen :  10/2: on 4 L of oxygen ; try to minimise o x as tolerated  10/3:  called daughter : she is aware of him having malignancy probably:   -on prednisone  -daughter does not to work up for malignancy  :  10/4:  -he remains OK;  les acitated today  ;  alert and awke:   no resp distress:  no wheezing:  for malignancy as above   10/5  -he seems comfortable:   -he is on 6 L of oxygen : need to decrease:  the goal of o2 saturation is arround 885 in his copd pt: he does not need this much of oxygen    -cont BD   -he is on chr 10 mg of prednisone  -he is delirious too :   10/6:  seems OK:  still on 5 L : despite the goal of saturation is 88%: had dw acp yesterday:  I believe the oxygen can still be lowered to 2 L per minute and further  cont BD   10/8:  -he is on baseline of home oxygen requirement at 4 L per minute:   he is not agitated and is not in any resp distress:  cont current therapy:   10/9: seems comfortable;  on 3 L of oxygen : shei s alert and of confused;     Mild leukocytosis to 12k on admission  - infectious vs reactive. afebrile, not septic, hemodynamically stable.   -Without any new fevers,   -URI sxs, worsening of chronic cough, or increased O2 requirements  - Procal negative.  - cw abx as per ID  ; to cont for now;   -needs ct chest     9/29; -on no antibiotics   -afebrile today  ;  wbc is normal today     9/30: remains afebrile and wbc is normal   10/1: seems OK:   10/2: cont current care:   10/4:  leucocytosis resolved;   10/5:l resolved   10/6  seems eryn :  no sob:  on 5 L of oxygen    10/7;  -today on 4 L:  still satting good;  100%  10/8: cont 4 L of oxygen : no sob:  no cough :   10/9; cont current meds    Advanced COPD.   -ow suspicion for COPD exacerbation, lung exam with localized rhonchi/coarse crackles over R>L base. No increased baseline O2 requirement or worsening of chronic cough  - continue home prednisone 10mg daily   - duonebs PRN.  -and his VBg also did not show any  significant retention ofpco2:     9/30:  -cont current rx:  he seems stable:   -ct chest showed; Extensive emphysematous changes. Bibasilar predominant peripheral reticular opacities, traction bronchiectasis, and subpleural cystic changes, compatible with   interstitial lung disease. Right lung pulmonary nodules, measuring up to 1.8 cm, concerning for neoplasia.  Consider PET scan or 3 month follow up. Small left pleural effusion with basilar pleural thickening. Partially imaged left parotid gland mass. Consider further evaluation with contrast-enhanced MR neck from more definitive characterization.    10/1:  dw daughter : she will decide about the pulm nodule:  and has parotid gland tumor ; work up per primary team     10/3: no workup per daughter   10/4:  pulm wise he seems stable:  no sob:  no wheezing   10/5: need to decrease the oxygen as documented above   10/6:  no wheezing:   no co2 retention  cont BD   10/7;  cont BD:  no wheezing:    10/8:  -remains stable:  pulm wise:   -he likely has malignant nodule:  daughter is aware:  probably would not do any further workup : but can repeat his ct scan chest in 6 weeks time     Chronic hypoxic respiratory failure.   ·  Plan: - continue home NC at 4L NC, goal 88-92&-likely due to copd   10/2; seems to be doing  ok :   10/03: cont 4 L of NC :  he is not wheezing:  vbg ordered   10/04;-VBG is very good:   10/5: VBG reasonable  no co2 retention:  reduce oxygen    10/6 : try to decrease oxygen down to 1 L  per minute or even off   10/7; cont to wean off oxygen    10/8: he is on baseline of 4 L of oxygen    10/9: cont wean donw oxygen as possible    CAD (coronary artery disease).    - continue home statin, plavix.    -NEW aflutter   -echo showed;  1. Left ventricular systolic function is normal with an ejection fraction of 69 % by Alex's method of disks.   2. Enlarged right ventricular cavity size and reduced right ventricular systolic function.   3. Left atrium is normal in size.   4. The aortic valve appears trileaflet with reduced systolic excursion. There is moderate aortic stenosis. The peak transaortic velocity is 2.37 m/s, peak transaortic gradient is 22.5 mmHg and mean transaortic gradient is 11.0 mmHg with an LVOT/aortic valve VTI ratio of 0.39. The aortic valve area is estimated at 1.21 cm² by the continuity equation.   5. Estimated pulmonary artery systolic pressure is 57 mmHg, consistent with moderate pulmonary hypertension.  defer to cards     10/5L converted back to NSR : daughter refused for AC:        dw acp

## 2024-10-09 NOTE — PROGRESS NOTE ADULT - ASSESSMENT
89M with PMH cognitive dysfunction (AAOx2 at baseline), advanced COPD on daily prednisone with chronic respiratory failure on 4L NC, CAD/MI s/p stent, HLD, and BPH BIBEMS from [facility] for confusion x 2 days. Chest imaging suggestive of pulmonary edema vs pneumonia.         Problem/Plan - 1:  ·  Problem: Acute metabolic encephalopathy.   -likely worsening dementia   - frequent reorientation-    Problem/Plan - 2:·  Problem: Leukocytosis.   resolved   ID following       Problem/Plan - 3:·  Problem: Advanced COPD. ·  Plan: low suspicion for COPD exacerbation, lung exam with localized rhonchi/coarse crackles over R>L base. No increased baseline O2 requirement or worsening of chronic cough  - continue home prednisone 10mg daily   - duonebs PRN.  pul following   still on 5 L n/c , taper as tolerated     Problem/Plan - 4:  ·  Problem: lung nodule : could be malignancy  ·  Plan: - CT reviewed  - Family does not want w/u or treatment   - family wants comfort measures only     Problem/Plan - 5:  ·  Problem: CAD (coronary artery disease).   ·  Plan: - continue home statin, plavix.    Problem/Plan - 6:  ·  Problem: BPH (benign prostatic hyperplasia).   ·  Plan: - continue home finasteride and tamsulosin.    Problem/Plan - 7:·  Problem: new aflutter   ·  Plan: cw tele  started on cardizem   echo : nml EF , likely diastolic failuire   seen by EP cardiology   HR is controlled   not candidate for any AC : 2/2 agitation and fall risk     dw sw, awaiting LTC

## 2024-10-09 NOTE — PROGRESS NOTE ADULT - SUBJECTIVE AND OBJECTIVE BOX
EP     HISTORY OF PRESENT ILLNESS: HPI:  89M with PMH cognitive dysfunction (AAOx2 at baseline), advanced COPD on daily prednisone with chronic respiratory failure on 4L NC, CAD/MI s/p stent, HLD, and BPH BIBEMS from [facility] for confusion in setting of suspected pneumonia. Pt does not know why he was brought to the hospital, unable to provide much history. Collateral obtained from daughter/HCP Meghan Gonzalez. Pt noted to have intermittent episodes of confusion by NH staff over the past 2 days, occasionally become agitated, saying nonsensical things, all unlike him. Pt is AAOx2 at baseline, would not know the year, but otherwise conversational. Whenever he was visited by daughter or son though, he was found to be at his baseline. He had a UA done that was unremarkable and a CXR that showed "pulmonary vascular congestion with patchy bibasilar infriltrates"- copy in pt's chart. And he was started on doxycycline yesterday, unclear how many doses he took. Pt with a chronic cough, did not notice anything like increased cough or sputum production from baseline. No witnessed episodes of shortness of breath, no increase in baseline oxygen requirement. No known fevers. Meghan believes patient has a history of heart failure and takes lasix regularly, however, heart failure not listed as a diagnosis from NH or [facility] outpatient pulm notes, lasix also not listed on medication list from NH or surescripts.   Pt himself currently feels well. Denies having any trouble breathing, chest pain, abdominal pain, or any other complaints.     Date of service  10/9 no new complaints      acetaminophen     Tablet .. 650 milliGRAM(s) Oral every 6 hours PRN  albuterol/ipratropium for Nebulization 3 milliLiter(s) Nebulizer every 6 hours PRN  aluminum hydroxide/magnesium hydroxide/simethicone Suspension 30 milliLiter(s) Oral every 4 hours PRN  atorvastatin 10 milliGRAM(s) Oral at bedtime  buDESOnide    Inhalation Suspension 0.5 milliGRAM(s) Inhalation every 12 hours  clopidogrel Tablet 75 milliGRAM(s) Oral daily  diltiazem    milliGRAM(s) Oral daily  enoxaparin Injectable 40 milliGRAM(s) SubCutaneous every 24 hours  finasteride 5 milliGRAM(s) Oral daily  furosemide    Tablet 40 milliGRAM(s) Oral once  melatonin 3 milliGRAM(s) Oral at bedtime PRN  ondansetron Injectable 4 milliGRAM(s) IV Push every 8 hours PRN  predniSONE   Tablet 10 milliGRAM(s) Oral daily  senna 2 Tablet(s) Oral at bedtime  tamsulosin 0.4 milliGRAM(s) Oral at bedtime      T(C): 36.5 (10-09-24 @ 11:43), Max: 36.7 (10-08-24 @ 17:09)  HR: 75 (10-09-24 @ 11:43) (67 - 90)  BP: 118/61 (10-09-24 @ 11:43) (111/47 - 130/73)  RR: 18 (10-09-24 @ 11:43) (16 - 19)  SpO2: 98% (10-09-24 @ 11:43) (96% - 98%)  Wt(kg): --    I&O's Summary    08 Oct 2024 07:01  -  09 Oct 2024 07:00  --------------------------------------------------------  IN: 0 mL / OUT: 200 mL / NET: -200 mL      Appearance: elderly man, sleepy and confused.	  HEENT:   Normal oral mucosa, PERRL, EOMI	  Lymphatic: No lymphadenopathy , no edema  Cardiovascular: Normal S1 S2, No JVD, No murmurs , Peripheral pulses palpable 2+ bilaterally  Respiratory: Lungs clear to auscultation, normal effort 	  Gastrointestinal:  Soft, Non-tender, + BS	  Skin: No rashes, No ecchymoses, No cyanosis, warm to touch  Musculoskeletal: Normal range of motion, normal strength  Psychiatry:  Mood & affect appropriate    TELEMETRY: AFib/AFlutter	    Echo: < from: TTE W or WO Ultrasound Enhancing Agent (09.26.24 @ 15:20) >     1. Left ventricular systolic function is normal with an ejection fraction of 69 % by Alex's method ofdisks.   2. Enlargd right ventricular cavity size and reduced right ventricular systolic function.   3. Left atrium is normal in size.   4. The aortic valve appears trileaflet with reduced systolic excursion. There is moderate aortic stenosis. The peak transaortic velocity is 2.37 m/s, peak transaortic gradient is 22.5 mmHg and mean transaortic gradient is 11.0 mmHg with an LVOT/aortic valve VTI ratio of 0.39. The aortic valve area is estimated at 1.21 cm² by the continuity equation.   5. Estimated pulmonary artery systolic pressure is 57 mmHg, consistent with moderate pulmonary hypertension.   6. No pericardial effusion seen.    < end of copied text >  	  ASSESSMENT/PLAN: Mr Barajas is a 89y Male here from nursing home w/ shortness of breath.  Has COPD, may have superimposed pneumonia.  New dx of atrial arrhythmia, may be causing acute diastolic heart failure.  Continue AV aniket blockade w/ diltiazem.  Heartrate is adequately controlled now.  Backup plan is a Digoxin load.  Awaiting remainder of workup and goals of care before starting oral anticoagulation for stroke prevention.      Tarik Marin M.D.  Cardiac Electrophysiology  [phone]

## 2024-10-10 RX ADMIN — PREDNISONE 10 MILLIGRAM(S): 5 TABLET ORAL at 05:08

## 2024-10-10 RX ADMIN — Medication 0.5 MILLIGRAM(S): at 09:49

## 2024-10-10 RX ADMIN — Medication 2 TABLET(S): at 21:54

## 2024-10-10 RX ADMIN — Medication 120 MILLIGRAM(S): at 05:08

## 2024-10-10 RX ADMIN — Medication 0.5 MILLIGRAM(S): at 20:23

## 2024-10-10 RX ADMIN — ATORVASTATIN CALCIUM 10 MILLIGRAM(S): 10 TABLET, FILM COATED ORAL at 21:55

## 2024-10-10 RX ADMIN — Medication 0.4 MILLIGRAM(S): at 21:54

## 2024-10-10 NOTE — PROGRESS NOTE ADULT - SUBJECTIVE AND OBJECTIVE BOX
EP     HISTORY OF PRESENT ILLNESS: HPI:  89M with PMH cognitive dysfunction (AAOx2 at baseline), advanced COPD on daily prednisone with chronic respiratory failure on 4L NC, CAD/MI s/p stent, HLD, and BPH BIBEMS from [facility] for confusion in setting of suspected pneumonia. Pt does not know why he was brought to the hospital, unable to provide much history. Collateral obtained from daughter/HCP Meghan Gonzalez. Pt noted to have intermittent episodes of confusion by NH staff over the past 2 days, occasionally become agitated, saying nonsensical things, all unlike him. Pt is AAOx2 at baseline, would not know the year, but otherwise conversational. Whenever he was visited by daughter or son though, he was found to be at his baseline. He had a UA done that was unremarkable and a CXR that showed "pulmonary vascular congestion with patchy bibasilar infriltrates"- copy in pt's chart. And he was started on doxycycline yesterday, unclear how many doses he took. Pt with a chronic cough, did not notice anything like increased cough or sputum production from baseline. No witnessed episodes of shortness of breath, no increase in baseline oxygen requirement. No known fevers. Meghan believes patient has a history of heart failure and takes lasix regularly, however, heart failure not listed as a diagnosis from NH or [facility] outpatient pulm notes, lasix also not listed on medication list from NH or surescripts.   Pt himself currently feels well. Denies having any trouble breathing, chest pain, abdominal pain, or any other complaints.     Date of service  10/10 no new complaints      acetaminophen     Tablet .. 650 milliGRAM(s) Oral every 6 hours PRN  albuterol/ipratropium for Nebulization 3 milliLiter(s) Nebulizer every 6 hours PRN  aluminum hydroxide/magnesium hydroxide/simethicone Suspension 30 milliLiter(s) Oral every 4 hours PRN  atorvastatin 10 milliGRAM(s) Oral at bedtime  buDESOnide    Inhalation Suspension 0.5 milliGRAM(s) Inhalation every 12 hours  clopidogrel Tablet 75 milliGRAM(s) Oral daily  diltiazem    milliGRAM(s) Oral daily  enoxaparin Injectable 40 milliGRAM(s) SubCutaneous every 24 hours  finasteride 5 milliGRAM(s) Oral daily  melatonin 3 milliGRAM(s) Oral at bedtime PRN  ondansetron Injectable 4 milliGRAM(s) IV Push every 8 hours PRN  predniSONE   Tablet 10 milliGRAM(s) Oral daily  senna 2 Tablet(s) Oral at bedtime  tamsulosin 0.4 milliGRAM(s) Oral at bedtime      T(C): 36.3 (10-10-24 @ 11:23), Max: 36.6 (10-10-24 @ 05:00)  HR: 66 (10-10-24 @ 11:23) (65 - 73)  BP: 109/53 (10-10-24 @ 11:23) (107/62 - 109/53)  RR: 18 (10-10-24 @ 11:23) (18 - 18)  SpO2: 94% (10-10-24 @ 11:23) (94% - 98%)  Wt(kg): --    I&O's Summary    09 Oct 2024 07:01  -  10 Oct 2024 07:00  --------------------------------------------------------  IN: 0 mL / OUT: 400 mL / NET: -400 mL    Appearance: elderly man, sleepy and confused.	  HEENT:   Normal oral mucosa, PERRL, EOMI	  Lymphatic: No lymphadenopathy , no edema  Cardiovascular: Normal S1 S2, No JVD, No murmurs , Peripheral pulses palpable 2+ bilaterally  Respiratory: Lungs clear to auscultation, normal effort 	  Gastrointestinal:  Soft, Non-tender, + BS	  Skin: No rashes, No ecchymoses, No cyanosis, warm to touch  Musculoskeletal: Normal range of motion, normal strength  Psychiatry:  Mood & affect appropriate    TELEMETRY: AFib/AFlutter	    Echo: < from: TTE W or WO Ultrasound Enhancing Agent (09.26.24 @ 15:20) >     1. Left ventricular systolic function is normal with an ejection fraction of 69 % by Alex's method ofdisks.   2. Enlargd right ventricular cavity size and reduced right ventricular systolic function.   3. Left atrium is normal in size.   4. The aortic valve appears trileaflet with reduced systolic excursion. There is moderate aortic stenosis. The peak transaortic velocity is 2.37 m/s, peak transaortic gradient is 22.5 mmHg and mean transaortic gradient is 11.0 mmHg with an LVOT/aortic valve VTI ratio of 0.39. The aortic valve area is estimated at 1.21 cm² by the continuity equation.   5. Estimated pulmonary artery systolic pressure is 57 mmHg, consistent with moderate pulmonary hypertension.   6. No pericardial effusion seen.    < end of copied text >  	  ASSESSMENT/PLAN: Mr Barajas is a 89y Male here from nursing home w/ shortness of breath.  Has COPD, may have superimposed pneumonia.  New dx of atrial arrhythmia, may be causing acute diastolic heart failure.  Continue AV aniket blockade w/ diltiazem.  Heartrate is adequately controlled now.  Backup plan is a Digoxin load.  Awaiting remainder of workup and goals of care before starting oral anticoagulation for stroke prevention.      Tarik Marin M.D.  Cardiac Electrophysiology  [phone]

## 2024-10-10 NOTE — PROGRESS NOTE ADULT - ASSESSMENT
89M with PMH cognitive dysfunction (AAOx2 at baseline), advanced COPD on daily prednisone with chronic respiratory failure on 4L NC, CAD/MI s/p stent, HLD, and BPH BIBEMS from [facility] for confusion in setting of suspected pneumonia. Pt does not know why he was brought to the hospital, unable to provide much history. Collateral obtained from daughter/HCP Meghan Gonzalez. Pt noted to have intermittent episodes of confusion by NH staff over the past 2 days, occasionally become agitated, saying nonsensical things, all unlike him. Pt is AAOx2 at baseline, would not know the year, but otherwise conversational. Whenever he was visited by daughter or son though, he was found to be at his baseline. He had a UA done that was unremarkable and a CXR that showed "pulmonary vascular congestion with patchy bibasilar infriltrates"- copy in pt's chart. And he was started on doxycycline yesterday, unclear how many doses he took. Pt with a chronic cough, did not notice anything like increased cough or sputum production from baseline. No witnessed episodes of shortness of breath, no increase in baseline oxygen requirement. No known fevers. Meghan believes patient has a history of heart failure and takes lasix regularly, however, heart failure not listed as a diagnosis from NH or [facility] outpatient pulm notes, lasix also not listed on medication list from NH or surescripts.   Pt himself currently feels well. Denies having any trouble breathing, chest pain, abdominal pain, or any other complaints.     Acute metabolic encephalopathy.   -check repeat CT head -NOT DONE YET;  WAS CANCELLED   - could be related to infection?  pneumonia,  uti etc:  being rule dout  - his VBG is fine with no retention of co2:  so thats not the reason of him getting more confused;     9/29;  -he is still confused?  baseline not sure;   -he is not wheezing;  awaiting ct chest   -heis still on 5 L:  try to bring down ; sao2 gaol is 88%; ex smoker     9/30: he is still  pretty confused;    -on chr prednisone for copd;   he is not wheezing    10/1; seems to be doing  ok ;on 4 L of oxygen :  10/2: on 4 L of oxygen ; try to minimise o x as tolerated  10/3:  called daughter : she is aware of him having malignancy probably:   -on prednisone  -daughter does not to work up for malignancy  :  10/4:  -he remains OK;  les acitated today  ;  alert and awke:   no resp distress:  no wheezing:  for malignancy as above   10/5  -he seems comfortable:   -he is on 6 L of oxygen : need to decrease:  the goal of o2 saturation is arround 885 in his copd pt: he does not need this much of oxygen    -cont BD   -he is on chr 10 mg of prednisone  -he is delirious too :   10/6:  seems OK:  still on 5 L : despite the goal of saturation is 88%: had dw acp yesterday:  I believe the oxygen can still be lowered to 2 L per minute and further  cont BD   10/8:  -he is on baseline of home oxygen requirement at 4 L per minute:   he is not agitated and is not in any resp distress:  cont current therapy:   10/9: seems comfortable;  on 3 L of oxygen : shei s alert and of confused;   10/10:  -seems OK: on 2-3 L of oxygen :  no wheezing:  no overnight events       Mild leukocytosis to 12k on admission  - infectious vs reactive. afebrile, not septic, hemodynamically stable.   -Without any new fevers,   -URI sxs, worsening of chronic cough, or increased O2 requirements  - Procal negative.  - cw abx as per ID  ; to cont for now;   -needs ct chest     9/29; -on no antibiotics   -afebrile today  ;  wbc is normal today     9/30: remains afebrile and wbc is normal   10/1: seems OK:   10/2: cont current care:   10/4:  leucocytosis resolved;   10/5:l resolved   10/6  seems eryn :  no sob:  on 5 L of oxygen    10/7;  -today on 4 L:  still satting good;  100%  10/8: cont 4 L of oxygen : no sob:  no cough :   10/9; cont current meds  10/10: Last wbc count on 6th was normal     Advanced COPD.   -ow suspicion for COPD exacerbation, lung exam with localized rhonchi/coarse crackles over R>L base. No increased baseline O2 requirement or worsening of chronic cough  - continue home prednisone 10mg daily   - duonebs PRN.  -and his VBg also did not show any  significant retention ofpco2:     9/30:  -cont current rx:  he seems stable:   -ct chest showed; Extensive emphysematous changes. Bibasilar predominant peripheral reticular opacities, traction bronchiectasis, and subpleural cystic changes, compatible with   interstitial lung disease. Right lung pulmonary nodules, measuring up to 1.8 cm, concerning for neoplasia.  Consider PET scan or 3 month follow up. Small left pleural effusion with basilar pleural thickening. Partially imaged left parotid gland mass. Consider further evaluation with contrast-enhanced MR neck from more definitive characterization.    10/1:  dw daughter : she will decide about the pulm nodule:  and has parotid gland tumor ; work up per primary team     10/3: no workup per daughter   10/4:  pulm wise he seems stable:  no sob:  no wheezing   10/5: need to decrease the oxygen as documented above   10/6:  no wheezing:   no co2 retention  cont BD   10/7;  cont BD:  no wheezing:    10/8:  -remains stable:  pulm wise:   -he likely has malignant nodule:  daughter is aware:  probably would not do any further workup : but can repeat his ct scan chest in 6 weeks time     Chronic hypoxic respiratory failure.   ·  Plan: - continue home NC at 4L NC, goal 88-92&-likely due to copd   10/2; seems to be doing  ok :   10/03: cont 4 L of NC :  he is not wheezing:  vbg ordered   10/04;-VBG is very good:   10/5: VBG reasonable  no co2 retention:  reduce oxygen    10/6 : try to decrease oxygen down to 1 L  per minute or even off   10/7; cont to wean off oxygen    10/8: he is on baseline of 4 L of oxygen    10/9: cont wean down oxygen as possible  10/10:  sheis still on 3 L of oxygen      CAD (coronary artery disease).    - continue home statin, plavix.    -NEW aflutter   -echo showed;  1. Left ventricular systolic function is normal with an ejection fraction of 69 % by Alex's method of disks.   2. Enlarged right ventricular cavity size and reduced right ventricular systolic function.   3. Left atrium is normal in size.   4. The aortic valve appears trileaflet with reduced systolic excursion. There is moderate aortic stenosis. The peak transaortic velocity is 2.37 m/s, peak transaortic gradient is 22.5 mmHg and mean transaortic gradient is 11.0 mmHg with an LVOT/aortic valve VTI ratio of 0.39. The aortic valve area is estimated at 1.21 cm² by the continuity equation.   5. Estimated pulmonary artery systolic pressure is 57 mmHg, consistent with moderate pulmonary hypertension.  defer to cards     10/5L converted back to NSR : daughter refused for AC:        dw acp

## 2024-10-10 NOTE — PROGRESS NOTE ADULT - SUBJECTIVE AND OBJECTIVE BOX
DATE OF SERVICE: 10-10-24    No overnight events, unable to obtain ROS    MEDICATIONS  acetaminophen     Tablet .. 650 milliGRAM(s) Oral every 6 hours PRN  albuterol/ipratropium for Nebulization 3 milliLiter(s) Nebulizer every 6 hours PRN  aluminum hydroxide/magnesium hydroxide/simethicone Suspension 30 milliLiter(s) Oral every 4 hours PRN  atorvastatin 10 milliGRAM(s) Oral at bedtime  buDESOnide    Inhalation Suspension 0.5 milliGRAM(s) Inhalation every 12 hours  clopidogrel Tablet 75 milliGRAM(s) Oral daily  diltiazem    milliGRAM(s) Oral daily  enoxaparin Injectable 40 milliGRAM(s) SubCutaneous every 24 hours  finasteride 5 milliGRAM(s) Oral daily  furosemide    Tablet 40 milliGRAM(s) Oral once  melatonin 3 milliGRAM(s) Oral at bedtime PRN  ondansetron Injectable 4 milliGRAM(s) IV Push every 8 hours PRN  predniSONE   Tablet 10 milliGRAM(s) Oral daily  senna 2 Tablet(s) Oral at bedtime  tamsulosin 0.4 milliGRAM(s) Oral at bedtime      T(C): 36.3 (10-10-24 @ 11:23), Max: 36.6 (10-10-24 @ 05:00)  HR: 66 (10-10-24 @ 11:23) (65 - 73)  BP: 109/53 (10-10-24 @ 11:23) (107/62 - 109/53)  RR: 18 (10-10-24 @ 11:23) (18 - 18)  SpO2: 94% (10-10-24 @ 11:23) (94% - 98%)  Wt(kg): --    I&O's Summary    09 Oct 2024 07:01  -  10 Oct 2024 07:00  --------------------------------------------------------  IN: 0 mL / OUT: 400 mL / NET: -400 mL    General: NAD  Head: Normocephalic and atraumatic.   Neck: No JVD. No bruits. Supple. Does not appear to be enlarged.   Cardiovascular: + S1,S2 ; RRR Soft systolic murmur at the left lower sternal border. No rubs noted.    Lungs: CTA b/l. No rhonchi, rales or wheezes.   Abdomen: + BS, soft. Non tender. Non distended. No rebound. No guarding.   Extremities: No clubbing/cyanosis/edema.   Neurologic: Moves all four extremities. Full range of motion.   Skin: Warm and moist. The patient's skin has normal elasticity and good skin turgor.   Psychiatric: cannot eval  Musculoskeletal: Normal range of motion, normal strength    DATA    tele- NSR 50s    ECG:  	AF 140s    < from: TTE W or WO Ultrasound Enhancing Agent (09.26.24 @ 15:20) >     CONCLUSIONS:      1. Left ventricular systolic function is normal with an ejection fraction of 69 % by Alex's method ofdisks.   2. Enlarged right ventricular cavity size and reduced right ventricular systolic function.   3. Left atrium is normal in size.   4. The aortic valve appears trileaflet with reduced systolic excursion. There is moderate aortic stenosis. The peak transaortic velocity is 2.37 m/s, peak transaortic gradient is 22.5 mmHg and mean transaortic gradient is 11.0 mmHg with an LVOT/aortic valve VTI ratio of 0.39. The aortic valve area is estimated at 1.21 cm² by the continuity equation.   5. Estimated pulmonary artery systolic pressure is 57 mmHg, consistent with moderate pulmonary hypertension.   6. No pericardial effusion seen.  < end of copied text >      ASSESSMENT/PLAN: 	  89M with PMH cognitive dysfunction (AAOx2 at baseline), advanced COPD on daily prednisone with chronic respiratory failure on 4L NC, CAD/MI s/p stent 15 years ago, HLD, and BPH BIBEMS from [facility] for confusion in setting of suspected pneumonia. Cardiology consulted for New onset Rapid Afib /?flutter today    --TTE noted with Normal LVEF, mod AS, mod Pulm HTN  --remains in SR on cardizem CD 120mg daily  --Despite [id number] of atleast 3, given AMS/agitation/lung mass, hold off on full Ac for now  --Ct head/chest noted--family does not want malignancy w/u, spoke with daughter states that he is fairly bedbound over last couple months and needs help with ADLs also reports that since his mental status has been waxing and waning would prefer to hold of AC for now and its bleeding risk    Jessica VAZQUEZ  [phone]

## 2024-10-10 NOTE — PROGRESS NOTE ADULT - SUBJECTIVE AND OBJECTIVE BOX
Patient is a 89y old  Male who presents with a chief complaint of acute metabolic encephalopathy (10 Oct 2024 14:10)    Date of servie : 10-10-24 @ 14:30  INTERVAL HPI/OVERNIGHT EVENTS:  T(C): 36.3 (10-10-24 @ 11:23), Max: 36.6 (10-10-24 @ 05:00)  HR: 66 (10-10-24 @ 11:23) (65 - 73)  BP: 109/53 (10-10-24 @ 11:23) (107/62 - 109/53)  RR: 18 (10-10-24 @ 11:23) (18 - 18)  SpO2: 94% (10-10-24 @ 11:23) (94% - 98%)  Wt(kg): --  I&O's Summary    09 Oct 2024 07:01  -  10 Oct 2024 07:00  --------------------------------------------------------  IN: 0 mL / OUT: 400 mL / NET: -400 mL        LABS:              CAPILLARY BLOOD GLUCOSE                MEDICATIONS  (STANDING):  atorvastatin 10 milliGRAM(s) Oral at bedtime  buDESOnide    Inhalation Suspension 0.5 milliGRAM(s) Inhalation every 12 hours  clopidogrel Tablet 75 milliGRAM(s) Oral daily  diltiazem    milliGRAM(s) Oral daily  enoxaparin Injectable 40 milliGRAM(s) SubCutaneous every 24 hours  finasteride 5 milliGRAM(s) Oral daily  predniSONE   Tablet 10 milliGRAM(s) Oral daily  senna 2 Tablet(s) Oral at bedtime  tamsulosin 0.4 milliGRAM(s) Oral at bedtime    MEDICATIONS  (PRN):  acetaminophen     Tablet .. 650 milliGRAM(s) Oral every 6 hours PRN Temp greater or equal to 38C (100.4F), Mild Pain (1 - 3)  albuterol/ipratropium for Nebulization 3 milliLiter(s) Nebulizer every 6 hours PRN Shortness of Breath and/or Wheezing  aluminum hydroxide/magnesium hydroxide/simethicone Suspension 30 milliLiter(s) Oral every 4 hours PRN Dyspepsia  melatonin 3 milliGRAM(s) Oral at bedtime PRN Insomnia  ondansetron Injectable 4 milliGRAM(s) IV Push every 8 hours PRN Nausea and/or Vomiting          PHYSICAL EXAM:  GENERAL: NAD, well-groomed, well-developed  HEAD:  Atraumatic, Normocephalic  CHEST/LUNG: Clear to percussion bilaterally; No rales, rhonchi, wheezing, or rubs  HEART: Regular rate and rhythm; No murmurs, rubs, or gallops  ABDOMEN: Soft, Nontender, Nondistended; Bowel sounds present  EXTREMITIES:  2+ Peripheral Pulses, No clubbing, cyanosis, or edema  LYMPH: No lymphadenopathy noted  SKIN: No rashes or lesions    Care Discussed with Consultants/Other Providers [ ] YES  [ ] NO

## 2024-10-10 NOTE — PROGRESS NOTE ADULT - SUBJECTIVE AND OBJECTIVE BOX
Date of Service: 10-10-24 @ 10:28    Patient is a 89y old  Male who presents with a chief complaint of acute metabolic encephalopathy (09 Oct 2024 15:43)      Any change in ROS: seems to be doing  ok : no sob: doing same:  on 2 L of oxygen : confused:  no overnight events         MEDICATIONS  (STANDING):  atorvastatin 10 milliGRAM(s) Oral at bedtime  buDESOnide    Inhalation Suspension 0.5 milliGRAM(s) Inhalation every 12 hours  clopidogrel Tablet 75 milliGRAM(s) Oral daily  diltiazem    milliGRAM(s) Oral daily  enoxaparin Injectable 40 milliGRAM(s) SubCutaneous every 24 hours  finasteride 5 milliGRAM(s) Oral daily  furosemide    Tablet 40 milliGRAM(s) Oral once  predniSONE   Tablet 10 milliGRAM(s) Oral daily  senna 2 Tablet(s) Oral at bedtime  tamsulosin 0.4 milliGRAM(s) Oral at bedtime    MEDICATIONS  (PRN):  acetaminophen     Tablet .. 650 milliGRAM(s) Oral every 6 hours PRN Temp greater or equal to 38C (100.4F), Mild Pain (1 - 3)  albuterol/ipratropium for Nebulization 3 milliLiter(s) Nebulizer every 6 hours PRN Shortness of Breath and/or Wheezing  aluminum hydroxide/magnesium hydroxide/simethicone Suspension 30 milliLiter(s) Oral every 4 hours PRN Dyspepsia  melatonin 3 milliGRAM(s) Oral at bedtime PRN Insomnia  ondansetron Injectable 4 milliGRAM(s) IV Push every 8 hours PRN Nausea and/or Vomiting    Vital Signs Last 24 Hrs  T(C): 36.6 (10 Oct 2024 05:00), Max: 36.6 (10 Oct 2024 05:00)  T(F): 97.9 (10 Oct 2024 05:00), Max: 97.9 (10 Oct 2024 05:00)  HR: 65 (10 Oct 2024 10:09) (65 - 75)  BP: 107/62 (10 Oct 2024 05:00) (107/62 - 118/61)  BP(mean): --  RR: 18 (10 Oct 2024 05:00) (18 - 18)  SpO2: 98% (10 Oct 2024 10:09) (96% - 98%)    Parameters below as of 10 Oct 2024 05:00  Patient On (Oxygen Delivery Method): nasal cannula  O2 Flow (L/min): 4      I&O's Summary    09 Oct 2024 07:01  -  10 Oct 2024 07:00  --------------------------------------------------------  IN: 0 mL / OUT: 400 mL / NET: -400 mL          Physical Exam:   GENERAL: NAD, well-groomed, well-developed  HEENT: SUGEY/   Atraumatic, Normocephalic  ENMT: No tonsillar erythema, exudates, or enlargement; Moist mucous membranes, Good dentition, No lesions  NECK: Supple, No JVD, Normal thyroid  CHEST/LUNG: Clear to auscultaion  CVS: Regular rate and rhythm; No murmurs, rubs, or gallops  GI: : Soft, Nontender, Nondistended; Bowel sounds present  NERVOUS SYSTEM:  Alert & Oriented X1  EXTREMITIES:- edema  LYMPH: No lymphadenopathy noted  SKIN: No rashes or lesions  ENDOCRINOLOGY: No Thyromegaly  PSYCH: Appropriate    Labs:  27              CAPILLARY BLOOD GLUCOSE    < from: CT Chest No Cont (09.29.24 @ 13:10) >  AIRWAYS/LUNGS/PLEURA: Patent central airways. Extensive emphysematous   changes. Bilateral peripheral reticular opacities, traction   bronchiectasis, and subpleural cystic changes with basilar predominance.   A 1.0 cm right upper lobe pulmonary nodule (2-72), a 1.4 cm right middle   lobe pulmonary nodule (2-111), and a 1.8 cm right lower lobe pulmonary   nodule (2-121). Small left pleural effusion and left basilar pleural   thickening.    MEDIASTINUM AND AJ: No lymphadenopathy.    VESSELS: Aortic calcifications. Coronary artery calcifications.    HEART: Heart size is normal. No pericardial effusion.    VISUALIZED UPPER ABDOMEN: A 6.0 cm hepatic dome cyst. Cholecystectomy.    CHEST WALL AND BONES: Degenerative changes of the thoracic spine. A 9 mm   right thyroid lobe hypoattenuating nodule (2-39). 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    IMPRESSION:    Extensive emphysematous changes.    Bibasilar predominant peripheral reticular opacities, traction   bronchiectasis, and subpleural cystic changes, compatible with   interstitial lung disease.    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  Consider PET scan or 3 month follow up.    Small left pleural effusion with basilar pleural thickening.    Partially imaged left parotid gland mass. Consider further evaluation   with contrast-enhanced MR neck from more definitive characterization.    --- End of Report ---    < end of copied text >                    RECENT CULTURES:        RESPIRATORY CULTURES:          Studies  Chest X-RAY  CT SCAN Chest   Venous Dopplers: LE:   CT Abdomen  Others

## 2024-10-10 NOTE — PROGRESS NOTE ADULT - ASSESSMENT
89M with PMH cognitive dysfunction (AAOx2 at baseline), advanced COPD on daily prednisone with chronic respiratory failure on 4L NC, CAD/MI s/p stent, HLD, and BPH BIBEMS from [facility] for confusion x 2 days. Chest imaging suggestive of pulmonary edema vs pneumonia.         Problem/Plan - 1:  ·  Problem: Acute metabolic encephalopathy.   -likely worsening dementia   - frequent reorientation-    Problem/Plan - 2:·  Problem: Leukocytosis.   resolved   ID following       Problem/Plan - 3:·  Problem: Advanced COPD. ·  Plan: low suspicion for COPD exacerbation, lung exam with localized rhonchi/coarse crackles over R>L base. No increased baseline O2 requirement or worsening of chronic cough  - continue home prednisone 10mg daily   - duonebs PRN.  pul following   still on 5 L n/c , taper as tolerated     Problem/Plan - 4:  ·  Problem: lung nodule : could be malignancy  ·  Plan: - CT reviewed  - Family does not want w/u or treatment   - family wants comfort measures only     Problem/Plan - 5:  ·  Problem: CAD (coronary artery disease).   ·  Plan: - continue home statin, plavix.Problem/Plan - 6:  ·  Problem: BPH (benign prostatic hyperplasia).   ·  Plan: - continue home finasteride and tamsulosin.    Problem/Plan - 7:·  Problem: new aflutter   ·  Plan: cw tele  started on cardizem   echo : nml EF , likely diastolic failuire   seen by EP cardiology   HR is controlled   not candidate for any AC : 2/2 agitation and fall risk     dw sw, awaiting LTC

## 2024-10-11 RX ADMIN — Medication 0.5 MILLIGRAM(S): at 09:24

## 2024-10-11 RX ADMIN — Medication 2 TABLET(S): at 21:28

## 2024-10-11 RX ADMIN — Medication 120 MILLIGRAM(S): at 05:59

## 2024-10-11 RX ADMIN — FINASTERIDE 5 MILLIGRAM(S): 5 TABLET, FILM COATED ORAL at 09:13

## 2024-10-11 RX ADMIN — Medication 75 MILLIGRAM(S): at 09:13

## 2024-10-11 RX ADMIN — ENOXAPARIN SODIUM 40 MILLIGRAM(S): 150 INJECTION SUBCUTANEOUS at 09:13

## 2024-10-11 RX ADMIN — Medication 0.4 MILLIGRAM(S): at 21:28

## 2024-10-11 RX ADMIN — ATORVASTATIN CALCIUM 10 MILLIGRAM(S): 10 TABLET, FILM COATED ORAL at 21:28

## 2024-10-11 RX ADMIN — IPRATROPIUM BROMIDE AND ALBUTEROL SULFATE 3 MILLILITER(S): .5; 3 SOLUTION RESPIRATORY (INHALATION) at 09:26

## 2024-10-11 RX ADMIN — Medication 0.5 MILLIGRAM(S): at 20:37

## 2024-10-11 RX ADMIN — PREDNISONE 10 MILLIGRAM(S): 5 TABLET ORAL at 05:59

## 2024-10-11 NOTE — PROGRESS NOTE ADULT - ASSESSMENT
89M with PMH cognitive dysfunction (AAOx2 at baseline), advanced COPD on daily prednisone with chronic respiratory failure on 4L NC, CAD/MI s/p stent, HLD, and BPH BIBEMS from [facility] for confusion x 2 days. Chest imaging suggestive of pulmonary edema vs pneumonia.         Problem/Plan - 1:  ·  Problem: Acute metabolic encephalopathy.   -likely worsening dementia   - frequent reorientation-    Problem/Plan - 2:·  Problem: Leukocytosis.   resolved   ID following       Problem/Plan - 3:·  Problem: Advanced COPD. ·  Plan: low suspicion for COPD exacerbation, lung exam with localized rhonchi/coarse crackles over R>L base. No increased baseline O2 requirement or worsening of chronic cough  - continue home prednisone 10mg daily   - duonebs PRN.  pul following     Problem/Plan - 4:  ·  Problem: lung nodule : could be malignancy  ·  Plan: - CT reviewed  - Family does not want w/u or treatment   - family wants comfort measures only     Problem/Plan - 5:  ·  Problem: CAD (coronary artery disease).   ·  Plan: - continue home statin, plavix.    Problem/Plan - 6:  ·  Problem: BPH (benign prostatic hyperplasia).   ·  Plan: - continue home finasteride and tamsulosin.    Problem/Plan - 7:·  Problem: new aflutter   ·  Plan: cw tele  started on cardizem   echo : nml EF , likely diastolic failuire   seen by EP cardiology   HR is controlled   not candidate for any AC : 2/2 agitation and fall risk     dw sw, awaiting LTC

## 2024-10-11 NOTE — PROGRESS NOTE ADULT - SUBJECTIVE AND OBJECTIVE BOX
DATE OF SERVICE: 10-11-24    No overnight events, in bed eating breakfast with help of aide    MEDICATIONS:  acetaminophen     Tablet .. 650 milliGRAM(s) Oral every 6 hours PRN  albuterol/ipratropium for Nebulization 3 milliLiter(s) Nebulizer every 6 hours PRN  aluminum hydroxide/magnesium hydroxide/simethicone Suspension 30 milliLiter(s) Oral every 4 hours PRN  atorvastatin 10 milliGRAM(s) Oral at bedtime  buDESOnide    Inhalation Suspension 0.5 milliGRAM(s) Inhalation every 12 hours  clopidogrel Tablet 75 milliGRAM(s) Oral daily  diltiazem    milliGRAM(s) Oral daily  enoxaparin Injectable 40 milliGRAM(s) SubCutaneous every 24 hours  finasteride 5 milliGRAM(s) Oral daily  melatonin 3 milliGRAM(s) Oral at bedtime PRN  ondansetron Injectable 4 milliGRAM(s) IV Push every 8 hours PRN  predniSONE   Tablet 10 milliGRAM(s) Oral daily  senna 2 Tablet(s) Oral at bedtime  tamsulosin 0.4 milliGRAM(s) Oral at bedtime      LABS:Creatinine Trend: 0.81<--, 0.65<--, 0.74<--, 0.73<--, 0.86<--, 0.78<--    COAGS:     PHYSICAL EXAM:  T(C): 36.6 (10-11-24 @ 06:21), Max: 36.6 (10-11-24 @ 06:21)  HR: 62 (10-11-24 @ 09:57) (62 - 74)  BP: 115/57 (10-11-24 @ 06:21) (109/53 - 119/62)  RR: 18 (10-11-24 @ 06:21) (18 - 18)  SpO2: 95% (10-11-24 @ 06:21) (94% - 98%)  Wt(kg): --    I&O's Summary      General: NAD  Head: Normocephalic and atraumatic.   Neck: No JVD. No bruits. Supple. Does not appear to be enlarged.   Cardiovascular: + S1,S2 ; RRR Soft systolic murmur at the left lower sternal border. No rubs noted.    Lungs: CTA b/l. No rhonchi, rales or wheezes.   Abdomen: + BS, soft. Non tender. Non distended. No rebound. No guarding.   Extremities: No clubbing/cyanosis/edema.   Neurologic: Moves all four extremities. Full range of motion.   Skin: Warm and moist. The patient's skin has normal elasticity and good skin turgor.   Psychiatric: cannot eval  Musculoskeletal: Normal range of motion, normal strength    DATA    tele- NSR 50s    ECG:  	AF 140s    < from: TTE W or WO Ultrasound Enhancing Agent (09.26.24 @ 15:20) >     CONCLUSIONS:      1. Left ventricular systolic function is normal with an ejection fraction of 69 % by Alex's method ofdisks.   2. Enlarged right ventricular cavity size and reduced right ventricular systolic function.   3. Left atrium is normal in size.   4. The aortic valve appears trileaflet with reduced systolic excursion. There is moderate aortic stenosis. The peak transaortic velocity is 2.37 m/s, peak transaortic gradient is 22.5 mmHg and mean transaortic gradient is 11.0 mmHg with an LVOT/aortic valve VTI ratio of 0.39. The aortic valve area is estimated at 1.21 cm² by the continuity equation.   5. Estimated pulmonary artery systolic pressure is 57 mmHg, consistent with moderate pulmonary hypertension.   6. No pericardial effusion seen.  < end of copied text >      ASSESSMENT/PLAN: 	  89M with PMH cognitive dysfunction (AAOx2 at baseline), advanced COPD on daily prednisone with chronic respiratory failure on 4L NC, CAD/MI s/p stent 15 years ago, HLD, and BPH BIBEMS from [facility] for confusion in setting of suspected pneumonia. Cardiology consulted for New onset Rapid Afib /?flutter today    --TTE noted with Normal LVEF, mod AS, mod Pulm HTN  --remains in SR on cardizem CD 120mg daily  --Despite [id number] of atleast 3, given AMS/agitation/lung mass, hold off on full Ac for now  --Ct head/chest noted--family does not want malignancy w/u, spoke with daughter states that he is fairly bedbound over last couple months and needs help with ADLs also reports that since his mental status has been waxing and waning would prefer to hold of AC for now and its bleeding risk    Hyun Cooper MD  Pager: [phone]

## 2024-10-11 NOTE — PROGRESS NOTE ADULT - SUBJECTIVE AND OBJECTIVE BOX
Date of Service: 10-11-24 @ 11:34    Patient is a 89y old  Male who presents with a chief complaint of acute metabolic encephalopathy (11 Oct 2024 10:04)      Any change in ROS: seems to be doing  ok ; on 4 L     MEDICATIONS  (STANDING):  atorvastatin 10 milliGRAM(s) Oral at bedtime  buDESOnide    Inhalation Suspension 0.5 milliGRAM(s) Inhalation every 12 hours  clopidogrel Tablet 75 milliGRAM(s) Oral daily  diltiazem    milliGRAM(s) Oral daily  enoxaparin Injectable 40 milliGRAM(s) SubCutaneous every 24 hours  finasteride 5 milliGRAM(s) Oral daily  predniSONE   Tablet 10 milliGRAM(s) Oral daily  senna 2 Tablet(s) Oral at bedtime  tamsulosin 0.4 milliGRAM(s) Oral at bedtime    MEDICATIONS  (PRN):  acetaminophen     Tablet .. 650 milliGRAM(s) Oral every 6 hours PRN Temp greater or equal to 38C (100.4F), Mild Pain (1 - 3)  albuterol/ipratropium for Nebulization 3 milliLiter(s) Nebulizer every 6 hours PRN Shortness of Breath and/or Wheezing  aluminum hydroxide/magnesium hydroxide/simethicone Suspension 30 milliLiter(s) Oral every 4 hours PRN Dyspepsia  melatonin 3 milliGRAM(s) Oral at bedtime PRN Insomnia  ondansetron Injectable 4 milliGRAM(s) IV Push every 8 hours PRN Nausea and/or Vomiting    Vital Signs Last 24 Hrs  T(C): 36.6 (11 Oct 2024 06:21), Max: 36.6 (11 Oct 2024 06:21)  T(F): 97.9 (11 Oct 2024 06:21), Max: 97.9 (11 Oct 2024 06:21)  HR: 62 (11 Oct 2024 09:57) (62 - 74)  BP: 115/57 (11 Oct 2024 06:21) (115/57 - 119/62)  BP(mean): --  RR: 18 (11 Oct 2024 06:21) (18 - 18)  SpO2: 95% (11 Oct 2024 06:21) (95% - 98%)    Parameters below as of 11 Oct 2024 09:57  Patient On (Oxygen Delivery Method): nasal cannula        I&O's Summary        Physical Exam:   GENERAL: NAD, well-groomed, well-developed  HEENT: SUGEY/   Atraumatic, Normocephalic  ENMT: No tonsillar erythema, exudates, or enlargement; Moist mucous membranes, Good dentition, No lesions  NECK: Supple, No JVD, Normal thyroid  CHEST/LUNG: Clear to auscultaion  CVS: Regular rate and rhythm; No murmurs, rubs, or gallops  GI: : Soft, Nontender, Nondistended; Bowel sounds present  NERVOUS SYSTEM:  Alert & Oriented X0  EXTREMITIES: - edema  LYMPH: No lymphadenopathy noted  SKIN: No rashes or lesions  ENDOCRINOLOGY: No Thyromegaly  PSYCH: Appropriate    Labs:                CAPILLARY BLOOD GLUCOSE            rad< from: CT Head No Cont (09.29.24 @ 13:10) >  TYMPANOMASTOID CAVITIES:  Clear.  VISUALIZED ORBITS: Bilateral lens replacement.  CALVARIUM: Intact.    MISCELLANEOUS: Incidental and partially imaged 1.5 x 2.4 cm soft tissue   attenuating mass in the left parotid gland at superior retrocondylar   location, and more peripheral 1.1 cm nodule that may be another tumor or   lymph node.      IMPRESSION:    No acute intracranial findings.    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 ENT referral is advised, and   further imaging with neck CT or MRI can be done as outpatient and tissue   sampling under ultrasound guidance.    --- End of Report ---          EMMA GUERRERO MD; Resident Radiologist  This document has been electronically signed.  OPAL RAMOS MD; Attending Radiologist    < end of copied text >  < from: CT Chest No Cont (09.29.24 @ 13:10) >  A 1.0 cm right upper lobe pulmonary nodule (2-72), a 1.4 cm right middle   lobe pulmonary nodule (2-111), and a 1.8 cm right lower lobe pulmonary   nodule (2-121). Small left pleural effusion and left basilar pleural   thickening.    MEDIASTINUM AND AJ: No lymphadenopathy.    VESSELS: Aortic calcifications. Coronary artery calcifications.    HEART: Heart size is normal. No pericardial effusion.    VISUALIZED UPPER ABDOMEN: A 6.0 cm hepatic dome cyst. Cholecystectomy.    CHEST WALL AND BONES: Degenerative changes of the thoracic spine. A 9 mm   right thyroid lobe hypoattenuating nodule (2-39). 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    IMPRESSION:    Extensive emphysematous changes.    Bibasilar predominant peripheral reticular opacities, traction   bronchiectasis, and subpleural cystic changes, compatible with   interstitial lung disease.    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  Consider PET scan or 3 month follow up.    Small left pleural effusion with basilar pleural thickening.    Partially imaged left parotid gland mass. Consider further evaluation   with contrast-enhanced MR neck from more definitive characterization.    --- End of Report ---      < end of copied text >            RECENT CULTURES:        RESPIRATORY CULTURES:          Studies  Chest X-RAY  CT SCAN Chest   Venous Dopplers: LE:   CT Abdomen  Others

## 2024-10-11 NOTE — PROGRESS NOTE ADULT - ASSESSMENT
89M with PMH cognitive dysfunction (AAOx2 at baseline), advanced COPD on daily prednisone with chronic respiratory failure on 4L NC, CAD/MI s/p stent, HLD, and BPH BIBEMS from [facility] for confusion in setting of suspected pneumonia. Pt does not know why he was brought to the hospital, unable to provide much history. Collateral obtained from daughter/HCP Meghan Gonzalez. Pt noted to have intermittent episodes of confusion by NH staff over the past 2 days, occasionally become agitated, saying nonsensical things, all unlike him. Pt is AAOx2 at baseline, would not know the year, but otherwise conversational. Whenever he was visited by daughter or son though, he was found to be at his baseline. He had a UA done that was unremarkable and a CXR that showed "pulmonary vascular congestion with patchy bibasilar infriltrates"- copy in pt's chart. And he was started on doxycycline yesterday, unclear how many doses he took. Pt with a chronic cough, did not notice anything like increased cough or sputum production from baseline. No witnessed episodes of shortness of breath, no increase in baseline oxygen requirement. No known fevers. Meghan believes patient has a history of heart failure and takes lasix regularly, however, heart failure not listed as a diagnosis from NH or [facility] outpatient pulm notes, lasix also not listed on medication list from NH or surescripts.   Pt himself currently feels well. Denies having any trouble breathing, chest pain, abdominal pain, or any other complaints.     Acute metabolic encephalopathy.   -check repeat CT head -NOT DONE YET;  WAS CANCELLED   - could be related to infection?  pneumonia,  uti etc:  being rule dout  - his VBG is fine with no retention of co2:  so thats not the reason of him getting more confused;     9/29;  -he is still confused?  baseline not sure;   -he is not wheezing;  awaiting ct chest   -heis still on 5 L:  try to bring down ; sao2 gaol is 88%; ex smoker     9/30: he is still  pretty confused;    -on chr prednisone for copd;   he is not wheezing    10/1; seems to be doing  ok ;on 4 L of oxygen :  10/2: on 4 L of oxygen ; try to minimise o x as tolerated  10/3:  called daughter : she is aware of him having malignancy probably:   -on prednisone  -daughter does not to work up for malignancy  :  10/4:  -he remains OK;  les acitated today  ;  alert and awke:   no resp distress:  no wheezing:  for malignancy as above   10/5  -he seems comfortable:   -he is on 6 L of oxygen : need to decrease:  the goal of o2 saturation is arround 885 in his copd pt: he does not need this much of oxygen    -cont BD   -he is on chr 10 mg of prednisone  -he is delirious too :   10/6:  seems OK:  still on 5 L : despite the goal of saturation is 88%: had dw acp yesterday:  I believe the oxygen can still be lowered to 2 L per minute and further  cont BD   10/8:  -he is on baseline of home oxygen requirement at 4 L per minute:   he is not agitated and is not in any resp distress:  cont current therapy:   10/9: seems comfortable;  on 3 L of oxygen : shei s alert and of confused;   10/10:  -seems OK: on 2-3 L of oxygen :  no wheezing:  no overnight events   10/11;  -on 4 L of oxygen ; she is doing  ok : no sob:  no coguh : no phlegm    -no sob:  no wheezing:  cont current rx:       Mild leukocytosis to 12k on admission  - infectious vs reactive. afebrile, not septic, hemodynamically stable.   -Without any new fevers,   -URI sxs, worsening of chronic cough, or increased O2 requirements  - Procal negative.  - cw abx as per ID  ; to cont for now;   -needs ct chest     9/29; -on no antibiotics   -afebrile today  ;  wbc is normal today     9/30: remains afebrile and wbc is normal   10/1: seems OK:   10/2: cont current care:   10/4:  leucocytosis resolved;   10/5:l resolved   10/6  seems eryn :  no sob:  on 5 L of oxygen    10/7;  -today on 4 L:  still satting good;  100%  10/8: cont 4 L of oxygen : no sob:  no cough :   10/9; cont current meds  10/10: Last wbc count on 6th was normal   10/11: no fever:      Advanced COPD.   -ow suspicion for COPD exacerbation, lung exam with localized rhonchi/coarse crackles over R>L base. No increased baseline O2 requirement or worsening of chronic cough  - continue home prednisone 10mg daily   - duonebs PRN.  -and his VBg also did not show any  significant retention ofpco2:     9/30:  -cont current rx:  he seems stable:   -ct chest showed; Extensive emphysematous changes. Bibasilar predominant peripheral reticular opacities, traction bronchiectasis, and subpleural cystic changes, compatible with   interstitial lung disease. Right lung pulmonary nodules, measuring up to 1.8 cm, concerning for neoplasia.  Consider PET scan or 3 month follow up. Small left pleural effusion with basilar pleural thickening. Partially imaged left parotid gland mass. Consider further evaluation with contrast-enhanced MR neck from more definitive characterization.    10/1:  dw daughter : she will decide about the pulm nodule:  and has parotid gland tumor ; work up per primary team     10/3: no workup per daughter   10/4:  pulm wise he seems stable:  no sob:  no wheezing   10/5: need to decrease the oxygen as documented above   10/6:  no wheezing:   no co2 retention  cont BD   10/7;  cont BD:  no wheezing:    10/8:  -remains stable:  pulm wise:   -he likely has malignant nodule:  daughter is aware:  probably would not do any further workup : but can repeat his ct scan chest in 6 weeks time   10/11: cont BD     Chronic hypoxic respiratory failure.   ·  Plan: - continue home NC at 4L NC, goal 88-92&-likely due to copd   10/2; seems to be doing  ok :   10/03: cont 4 L of NC :  he is not wheezing:  vbg ordered   10/04;-VBG is very good:   10/5: VBG reasonable  no co2 retention:  reduce oxygen    10/6 : try to decrease oxygen down to 1 L  per minute or even off   10/7; cont to wean off oxygen    10/8: he is on baseline of 4 L of oxygen    10/9: cont wean down oxygen as possible  10/10:  sheis still on 3-4 L of oxygen      CAD (coronary artery disease).    - continue home statin, plavix.    -NEW aflutter   -echo showed;  1. Left ventricular systolic function is normal with an ejection fraction of 69 % by Alex's method of disks.   2. Enlarged right ventricular cavity size and reduced right ventricular systolic function.   3. Left atrium is normal in size.   4. The aortic valve appears trileaflet with reduced systolic excursion. There is moderate aortic stenosis. The peak transaortic velocity is 2.37 m/s, peak transaortic gradient is 22.5 mmHg and mean transaortic gradient is 11.0 mmHg with an LVOT/aortic valve VTI ratio of 0.39. The aortic valve area is estimated at 1.21 cm² by the continuity equation.   5. Estimated pulmonary artery systolic pressure is 57 mmHg, consistent with moderate pulmonary hypertension.  defer to cards     10/5L converted back to NSR : daughter refused for AC:        dw acp

## 2024-10-11 NOTE — PROGRESS NOTE ADULT - SUBJECTIVE AND OBJECTIVE BOX
EP     HISTORY OF PRESENT ILLNESS: HPI:  89M with PMH cognitive dysfunction (AAOx2 at baseline), advanced COPD on daily prednisone with chronic respiratory failure on 4L NC, CAD/MI s/p stent, HLD, and BPH BIBEMS from [facility] for confusion in setting of suspected pneumonia. Pt does not know why he was brought to the hospital, unable to provide much history. Collateral obtained from daughter/HCP Meghan Gonzalez. Pt noted to have intermittent episodes of confusion by NH staff over the past 2 days, occasionally become agitated, saying nonsensical things, all unlike him. Pt is AAOx2 at baseline, would not know the year, but otherwise conversational. Whenever he was visited by daughter or son though, he was found to be at his baseline. He had a UA done that was unremarkable and a CXR that showed "pulmonary vascular congestion with patchy bibasilar infriltrates"- copy in pt's chart. And he was started on doxycycline yesterday, unclear how many doses he took. Pt with a chronic cough, did not notice anything like increased cough or sputum production from baseline. No witnessed episodes of shortness of breath, no increase in baseline oxygen requirement. No known fevers. Meghan believes patient has a history of heart failure and takes lasix regularly, however, heart failure not listed as a diagnosis from NH or [facility] outpatient pulm notes, lasix also not listed on medication list from NH or surescripts.   Pt himself currently feels well. Denies having any trouble breathing, chest pain, abdominal pain, or any other complaints.     Date of service  10/11 no new complaints      acetaminophen     Tablet .. 650 milliGRAM(s) Oral every 6 hours PRN  albuterol/ipratropium for Nebulization 3 milliLiter(s) Nebulizer every 6 hours PRN  aluminum hydroxide/magnesium hydroxide/simethicone Suspension 30 milliLiter(s) Oral every 4 hours PRN  atorvastatin 10 milliGRAM(s) Oral at bedtime  buDESOnide    Inhalation Suspension 0.5 milliGRAM(s) Inhalation every 12 hours  clopidogrel Tablet 75 milliGRAM(s) Oral daily  diltiazem    milliGRAM(s) Oral daily  enoxaparin Injectable 40 milliGRAM(s) SubCutaneous every 24 hours  finasteride 5 milliGRAM(s) Oral daily  melatonin 3 milliGRAM(s) Oral at bedtime PRN  ondansetron Injectable 4 milliGRAM(s) IV Push every 8 hours PRN  predniSONE   Tablet 10 milliGRAM(s) Oral daily  senna 2 Tablet(s) Oral at bedtime  tamsulosin 0.4 milliGRAM(s) Oral at bedtime      T(C): 36.3 (10-11-24 @ 11:56), Max: 36.6 (10-11-24 @ 06:21)  HR: 89 (10-11-24 @ 11:56) (62 - 89)  BP: 101/56 (10-11-24 @ 11:56) (101/56 - 119/62)  RR: 18 (10-11-24 @ 11:56) (18 - 18)  SpO2: 98% (10-11-24 @ 11:56) (95% - 98%)  Wt(kg): --    I&O's Summary    Appearance: elderly man, sleepy and confused.	  HEENT:   Normal oral mucosa, PERRL, EOMI	  Lymphatic: No lymphadenopathy , no edema  Cardiovascular: Normal S1 S2, No JVD, No murmurs , Peripheral pulses palpable 2+ bilaterally  Respiratory: Lungs clear to auscultation, normal effort 	  Gastrointestinal:  Soft, Non-tender, + BS	  Skin: No rashes, No ecchymoses, No cyanosis, warm to touch  Musculoskeletal: Normal range of motion, normal strength  Psychiatry:  Mood & affect appropriate    TELEMETRY: AFib/AFlutter	    Echo: < from: TTE W or WO Ultrasound Enhancing Agent (09.26.24 @ 15:20) >     1. Left ventricular systolic function is normal with an ejection fraction of 69 % by Alex's method ofdisks.   2. Enlargd right ventricular cavity size and reduced right ventricular systolic function.   3. Left atrium is normal in size.   4. The aortic valve appears trileaflet with reduced systolic excursion. There is moderate aortic stenosis. The peak transaortic velocity is 2.37 m/s, peak transaortic gradient is 22.5 mmHg and mean transaortic gradient is 11.0 mmHg with an LVOT/aortic valve VTI ratio of 0.39. The aortic valve area is estimated at 1.21 cm² by the continuity equation.   5. Estimated pulmonary artery systolic pressure is 57 mmHg, consistent with moderate pulmonary hypertension.   6. No pericardial effusion seen.    < end of copied text >  	  ASSESSMENT/PLAN: Mr Barajas is a 89y Male here from nursing home w/ shortness of breath.  Has COPD, may have superimposed pneumonia.  New dx of atrial arrhythmia, may be causing acute diastolic heart failure.  Continue AV aniket blockade w/ diltiazem.  Heartrate is adequately controlled now.  Backup plan is a Digoxin load.  Awaiting remainder of workup and goals of care before starting oral anticoagulation for stroke prevention.      Tarik Marin M.D.  Cardiac Electrophysiology  [phone]

## 2024-10-11 NOTE — PROGRESS NOTE ADULT - SUBJECTIVE AND OBJECTIVE BOX
Patient is a 89y old  Male who presents with a chief complaint of acute metabolic encephalopathy (11 Oct 2024 12:51)    Date of servie : 10-11-24 @ 13:52  INTERVAL HPI/OVERNIGHT EVENTS:  T(C): 36.3 (10-11-24 @ 11:56), Max: 36.6 (10-11-24 @ 06:21)  HR: 89 (10-11-24 @ 11:56) (62 - 89)  BP: 101/56 (10-11-24 @ 11:56) (101/56 - 119/62)  RR: 18 (10-11-24 @ 11:56) (18 - 18)  SpO2: 98% (10-11-24 @ 11:56) (95% - 98%)  Wt(kg): --  I&O's Summary      LABS:              CAPILLARY BLOOD GLUCOSE                MEDICATIONS  (STANDING):  atorvastatin 10 milliGRAM(s) Oral at bedtime  buDESOnide    Inhalation Suspension 0.5 milliGRAM(s) Inhalation every 12 hours  clopidogrel Tablet 75 milliGRAM(s) Oral daily  diltiazem    milliGRAM(s) Oral daily  enoxaparin Injectable 40 milliGRAM(s) SubCutaneous every 24 hours  finasteride 5 milliGRAM(s) Oral daily  predniSONE   Tablet 10 milliGRAM(s) Oral daily  senna 2 Tablet(s) Oral at bedtime  tamsulosin 0.4 milliGRAM(s) Oral at bedtime    MEDICATIONS  (PRN):  acetaminophen     Tablet .. 650 milliGRAM(s) Oral every 6 hours PRN Temp greater or equal to 38C (100.4F), Mild Pain (1 - 3)  albuterol/ipratropium for Nebulization 3 milliLiter(s) Nebulizer every 6 hours PRN Shortness of Breath and/or Wheezing  aluminum hydroxide/magnesium hydroxide/simethicone Suspension 30 milliLiter(s) Oral every 4 hours PRN Dyspepsia  melatonin 3 milliGRAM(s) Oral at bedtime PRN Insomnia  ondansetron Injectable 4 milliGRAM(s) IV Push every 8 hours PRN Nausea and/or Vomiting          PHYSICAL EXAM:  GENERAL: NAD, well-groomed, well-developed  HEAD:  Atraumatic, Normocephalic  CHEST/LUNG: Clear to percussion bilaterally; No rales, rhonchi, wheezing, or rubs  HEART: Regular rate and rhythm; No murmurs, rubs, or gallops  ABDOMEN: Soft, Nontender, Nondistended; Bowel sounds present  EXTREMITIES:  2+ Peripheral Pulses, No clubbing, cyanosis, or edema  LYMPH: No lymphadenopathy noted  SKIN: No rashes or lesions    Care Discussed with Consultants/Other Providers [ ] YES  [ ] NO

## 2024-10-12 RX ADMIN — Medication 2 TABLET(S): at 21:52

## 2024-10-12 RX ADMIN — Medication 0.4 MILLIGRAM(S): at 21:53

## 2024-10-12 RX ADMIN — PREDNISONE 10 MILLIGRAM(S): 5 TABLET ORAL at 04:58

## 2024-10-12 RX ADMIN — Medication 75 MILLIGRAM(S): at 12:55

## 2024-10-12 RX ADMIN — ATORVASTATIN CALCIUM 10 MILLIGRAM(S): 10 TABLET, FILM COATED ORAL at 21:52

## 2024-10-12 RX ADMIN — ENOXAPARIN SODIUM 40 MILLIGRAM(S): 150 INJECTION SUBCUTANEOUS at 12:56

## 2024-10-12 RX ADMIN — Medication 120 MILLIGRAM(S): at 04:58

## 2024-10-12 RX ADMIN — Medication 3 MILLIGRAM(S): at 21:53

## 2024-10-12 RX ADMIN — Medication 0.5 MILLIGRAM(S): at 22:27

## 2024-10-12 RX ADMIN — FINASTERIDE 5 MILLIGRAM(S): 5 TABLET, FILM COATED ORAL at 12:55

## 2024-10-12 NOTE — PROGRESS NOTE ADULT - SUBJECTIVE AND OBJECTIVE BOX
Date of Service: 10-12-24 @ 09:39    Patient is a 89y old  Male who presents with a chief complaint of acute metabolic encephalopathy (11 Oct 2024 13:51)      Any change in ROS: seems comfortable:   no sob;  no cough ;  no phlegm  -confused    MEDICATIONS  (STANDING):  atorvastatin 10 milliGRAM(s) Oral at bedtime  buDESOnide    Inhalation Suspension 0.5 milliGRAM(s) Inhalation every 12 hours  clopidogrel Tablet 75 milliGRAM(s) Oral daily  diltiazem    milliGRAM(s) Oral daily  enoxaparin Injectable 40 milliGRAM(s) SubCutaneous every 24 hours  finasteride 5 milliGRAM(s) Oral daily  predniSONE   Tablet 10 milliGRAM(s) Oral daily  senna 2 Tablet(s) Oral at bedtime  tamsulosin 0.4 milliGRAM(s) Oral at bedtime    MEDICATIONS  (PRN):  acetaminophen     Tablet .. 650 milliGRAM(s) Oral every 6 hours PRN Temp greater or equal to 38C (100.4F), Mild Pain (1 - 3)  albuterol/ipratropium for Nebulization 3 milliLiter(s) Nebulizer every 6 hours PRN Shortness of Breath and/or Wheezing  aluminum hydroxide/magnesium hydroxide/simethicone Suspension 30 milliLiter(s) Oral every 4 hours PRN Dyspepsia  melatonin 3 milliGRAM(s) Oral at bedtime PRN Insomnia  ondansetron Injectable 4 milliGRAM(s) IV Push every 8 hours PRN Nausea and/or Vomiting    Vital Signs Last 24 Hrs  T(C): 36.3 (12 Oct 2024 05:06), Max: 36.6 (11 Oct 2024 22:00)  T(F): 97.3 (12 Oct 2024 05:06), Max: 97.9 (11 Oct 2024 22:00)  HR: 66 (12 Oct 2024 05:06) (62 - 89)  BP: 108/69 (12 Oct 2024 05:06) (101/56 - 110/58)  BP(mean): --  RR: 18 (12 Oct 2024 05:06) (18 - 18)  SpO2: 98% (12 Oct 2024 05:06) (98% - 98%)    Parameters below as of 12 Oct 2024 05:06  Patient On (Oxygen Delivery Method): nasal cannula  O2 Flow (L/min): 4      I&O's Summary        Physical Exam:   GENERAL: NAD, well-groomed, well-developed  HEENT: SUGEY/   Atraumatic, Normocephalic  ENMT: No tonsillar erythema, exudates, or enlargement; Moist mucous membranes, Good dentition, No lesions  NECK: Supple, No JVD, Normal thyroid  CHEST/LUNG: Clear to auscultaion  CVS: Regular rate and rhythm; No murmurs, rubs, or gallops  GI: : Soft, Nontender, Nondistended; Bowel sounds present  NERVOUS SYSTEM:  Alert & awake:  confused   EXTREMITIES: -edema  LYMPH: No lymphadenopathy noted  SKIN: No rashes or lesions  ENDOCRINOLOGY: No Thyromegaly  PSYCH: calm     Labs:                CAPILLARY BLOOD GLUCOSE                < from: CT Chest No Cont (09.29.24 @ 13:10) >    VISUALIZED UPPER ABDOMEN: A 6.0 cm hepatic dome cyst. Cholecystectomy.    CHEST WALL AND BONES: Degenerative changes of the thoracic spine. A 9 mm   right thyroid lobe hypoattenuating nodule (2-39). 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    IMPRESSION:    Extensive emphysematous changes.    Bibasilar predominant peripheral reticular opacities, traction   bronchiectasis, and subpleural cystic changes, compatible with   interstitial lung disease.    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  Consider PET scan or 3 month follow up.    Small left pleural effusion with basilar pleural thickening.    Partially imaged left parotid gland mass. Consider further evaluation   with contrast-enhanced MR neck from more definitive characterization.    --- End of Report ---    < end of copied text >        RECENT CULTURES:        RESPIRATORY CULTURES:          Studies  Chest X-RAY  CT SCAN Chest   Venous Dopplers: LE:   CT Abdomen  Others

## 2024-10-12 NOTE — PROGRESS NOTE ADULT - ASSESSMENT
EP ATTENDING    Agree with above. Continue cardizem. Recommend lifelong anticoagulation for PAF if no contraindications.

## 2024-10-12 NOTE — PROGRESS NOTE ADULT - SUBJECTIVE AND OBJECTIVE BOX
Patient is a 89y old  Male who presents with a chief complaint of acute metabolic encephalopathy (12 Oct 2024 16:58)    Date of servie : 10-12-24 @ 17:16  INTERVAL HPI/OVERNIGHT EVENTS:  T(C): 36.5 (10-12-24 @ 12:02), Max: 36.6 (10-11-24 @ 22:00)  HR: 65 (10-12-24 @ 12:02) (65 - 84)  BP: 107/59 (10-12-24 @ 12:02) (107/59 - 110/58)  RR: 18 (10-12-24 @ 12:02) (18 - 18)  SpO2: 95% (10-12-24 @ 12:02) (95% - 98%)  Wt(kg): --  I&O's Summary      LABS:              CAPILLARY BLOOD GLUCOSE                MEDICATIONS  (STANDING):  atorvastatin 10 milliGRAM(s) Oral at bedtime  buDESOnide    Inhalation Suspension 0.5 milliGRAM(s) Inhalation every 12 hours  clopidogrel Tablet 75 milliGRAM(s) Oral daily  diltiazem    milliGRAM(s) Oral daily  enoxaparin Injectable 40 milliGRAM(s) SubCutaneous every 24 hours  finasteride 5 milliGRAM(s) Oral daily  predniSONE   Tablet 10 milliGRAM(s) Oral daily  senna 2 Tablet(s) Oral at bedtime  tamsulosin 0.4 milliGRAM(s) Oral at bedtime    MEDICATIONS  (PRN):  acetaminophen     Tablet .. 650 milliGRAM(s) Oral every 6 hours PRN Temp greater or equal to 38C (100.4F), Mild Pain (1 - 3)  albuterol/ipratropium for Nebulization 3 milliLiter(s) Nebulizer every 6 hours PRN Shortness of Breath and/or Wheezing  aluminum hydroxide/magnesium hydroxide/simethicone Suspension 30 milliLiter(s) Oral every 4 hours PRN Dyspepsia  melatonin 3 milliGRAM(s) Oral at bedtime PRN Insomnia  ondansetron Injectable 4 milliGRAM(s) IV Push every 8 hours PRN Nausea and/or Vomiting          PHYSICAL EXAM:  GENERAL: NAD, well-groomed, well-developed  HEAD:  Atraumatic, Normocephalic  CHEST/LUNG: Clear to percussion bilaterally; No rales, rhonchi, wheezing, or rubs  HEART: Regular rate and rhythm; No murmurs, rubs, or gallops  ABDOMEN: Soft, Nontender, Nondistended; Bowel sounds present  EXTREMITIES:  2+ Peripheral Pulses, No clubbing, cyanosis, or edema  LYMPH: No lymphadenopathy noted  SKIN: No rashes or lesions    Care Discussed with Consultants/Other Providers [ ] YES  [ ] NO

## 2024-10-12 NOTE — PROGRESS NOTE ADULT - SUBJECTIVE AND OBJECTIVE BOX
EP     HISTORY OF PRESENT ILLNESS: HPI:  89M with PMH cognitive dysfunction (AAOx2 at baseline), advanced COPD on daily prednisone with chronic respiratory failure on 4L NC, CAD/MI s/p stent, HLD, and BPH BIBEMS from [facility] for confusion in setting of suspected pneumonia. Pt does not know why he was brought to the hospital, unable to provide much history. Collateral obtained from daughter/HCP Meghan Gonzalez. Pt noted to have intermittent episodes of confusion by NH staff over the past 2 days, occasionally become agitated, saying nonsensical things, all unlike him. Pt is AAOx2 at baseline, would not know the year, but otherwise conversational. Whenever he was visited by daughter or son though, he was found to be at his baseline. He had a UA done that was unremarkable and a CXR that showed "pulmonary vascular congestion with patchy bibasilar infriltrates"- copy in pt's chart. And he was started on doxycycline yesterday, unclear how many doses he took. Pt with a chronic cough, did not notice anything like increased cough or sputum production from baseline. No witnessed episodes of shortness of breath, no increase in baseline oxygen requirement. No known fevers. Meghan believes patient has a history of heart failure and takes lasix regularly, however, heart failure not listed as a diagnosis from NH or [facility] outpatient pulm notes, lasix also not listed on medication list from NH or surescripts.   Pt himself currently feels well. Denies having any trouble breathing, chest pain, abdominal pain, or any other complaints.      10/11 no new complaints    Date of service 10/12/24  pt resting in bed , family at bedside. no distress         acetaminophen     Tablet .. 650 milliGRAM(s) Oral every 6 hours PRN  albuterol/ipratropium for Nebulization 3 milliLiter(s) Nebulizer every 6 hours PRN  aluminum hydroxide/magnesium hydroxide/simethicone Suspension 30 milliLiter(s) Oral every 4 hours PRN  atorvastatin 10 milliGRAM(s) Oral at bedtime  buDESOnide    Inhalation Suspension 0.5 milliGRAM(s) Inhalation every 12 hours  clopidogrel Tablet 75 milliGRAM(s) Oral daily  diltiazem    milliGRAM(s) Oral daily  enoxaparin Injectable 40 milliGRAM(s) SubCutaneous every 24 hours  finasteride 5 milliGRAM(s) Oral daily  melatonin 3 milliGRAM(s) Oral at bedtime PRN  ondansetron Injectable 4 milliGRAM(s) IV Push every 8 hours PRN  predniSONE   Tablet 10 milliGRAM(s) Oral daily  senna 2 Tablet(s) Oral at bedtime  tamsulosin 0.4 milliGRAM(s) Oral at bedtime                    Creatinine Trend: 0.81<--, 0.65<--, 0.74<--, 0.73<--, 0.86<--, 0.78<--    COAGS:           T(C): 36.3 (10-12-24 @ 05:06), Max: 36.6 (10-11-24 @ 22:00)  HR: 66 (10-12-24 @ 05:06) (62 - 89)  BP: 108/69 (10-12-24 @ 05:06) (101/56 - 110/58)  RR: 18 (10-12-24 @ 05:06) (18 - 18)  SpO2: 98% (10-12-24 @ 05:06) (98% - 98%)  Wt(kg): --    I&O's Summary    Appearance: elderly man, sleepy and confused.	  HEENT:   Normal oral mucosa, PERRL, EOMI	  Lymphatic: No lymphadenopathy , no edema  Cardiovascular: Normal S1 S2, No JVD, No murmurs , Peripheral pulses palpable 2+ bilaterally  Respiratory: Lungs clear to auscultation, normal effort 	  Gastrointestinal:  Soft, Non-tender, + BS	  Skin: No rashes, No ecchymoses, No cyanosis, warm to touch  Musculoskeletal: Normal range of motion, normal strength  Psychiatry:  Mood & affect appropriate    TELEMETRY: AFib/AFlutter	    Echo: < from: TTE W or WO Ultrasound Enhancing Agent (09.26.24 @ 15:20) >     1. Left ventricular systolic function is normal with an ejection fraction of 69 % by Alex's method ofdisks.   2. Enlargd right ventricular cavity size and reduced right ventricular systolic function.   3. Left atrium is normal in size.   4. The aortic valve appears trileaflet with reduced systolic excursion. There is moderate aortic stenosis. The peak transaortic velocity is 2.37 m/s, peak transaortic gradient is 22.5 mmHg and mean transaortic gradient is 11.0 mmHg with an LVOT/aortic valve VTI ratio of 0.39. The aortic valve area is estimated at 1.21 cm² by the continuity equation.   5. Estimated pulmonary artery systolic pressure is 57 mmHg, consistent with moderate pulmonary hypertension.   6. No pericardial effusion seen.    < end of copied text >  	  ASSESSMENT/PLAN: Mr Barajas is a 89y Male here from nursing home w/ shortness of breath.  Has COPD, may have superimposed pneumonia.  New dx of atrial arrhythmia, may be causing acute diastolic heart failure.  Continue AV aniket blockade w/ diltiazem.  Heartrate is adequately controlled now.  Backup plan is a Digoxin load.  Awaiting remainder of workup and goals of care before starting oral anticoagulation for stroke prevention.

## 2024-10-12 NOTE — PROGRESS NOTE ADULT - ASSESSMENT
89M with PMH cognitive dysfunction (AAOx2 at baseline), advanced COPD on daily prednisone with chronic respiratory failure on 4L NC, CAD/MI s/p stent, HLD, and BPH BIBEMS from [facility] for confusion in setting of suspected pneumonia. Pt does not know why he was brought to the hospital, unable to provide much history. Collateral obtained from daughter/HCP Meghan Gonzalez. Pt noted to have intermittent episodes of confusion by NH staff over the past 2 days, occasionally become agitated, saying nonsensical things, all unlike him. Pt is AAOx2 at baseline, would not know the year, but otherwise conversational. Whenever he was visited by daughter or son though, he was found to be at his baseline. He had a UA done that was unremarkable and a CXR that showed "pulmonary vascular congestion with patchy bibasilar infriltrates"- copy in pt's chart. And he was started on doxycycline yesterday, unclear how many doses he took. Pt with a chronic cough, did not notice anything like increased cough or sputum production from baseline. No witnessed episodes of shortness of breath, no increase in baseline oxygen requirement. No known fevers. Meghan believes patient has a history of heart failure and takes lasix regularly, however, heart failure not listed as a diagnosis from NH or [facility] outpatient pulm notes, lasix also not listed on medication list from NH or surescripts.   Pt himself currently feels well. Denies having any trouble breathing, chest pain, abdominal pain, or any other complaints.     Acute metabolic encephalopathy.   -check repeat CT head -NOT DONE YET;  WAS CANCELLED   - could be related to infection?  pneumonia,  uti etc:  being rule dout  - his VBG is fine with no retention of co2:  so thats not the reason of him getting more confused;     9/29;  -he is still confused?  baseline not sure;   -he is not wheezing;  awaiting ct chest   -heis still on 5 L:  try to bring down ; sao2 gaol is 88%; ex smoker     9/30: he is still  pretty confused;    -on chr prednisone for copd;   he is not wheezing    10/1; seems to be doing  ok ;on 4 L of oxygen :  10/2: on 4 L of oxygen ; try to minimise o x as tolerated  10/3:  called daughter : she is aware of him having malignancy probably:   -on prednisone  -daughter does not to work up for malignancy  :  10/4:  -he remains OK;  les acitated today  ;  alert and awke:   no resp distress:  no wheezing:  for malignancy as above   10/5  -he seems comfortable:   -he is on 6 L of oxygen : need to decrease:  the goal of o2 saturation is arround 885 in his copd pt: he does not need this much of oxygen    -cont BD   -he is on chr 10 mg of prednisone  -he is delirious too :   10/6:  seems OK:  still on 5 L : despite the goal of saturation is 88%: had dw acp yesterday:  I believe the oxygen can still be lowered to 2 L per minute and further  cont BD   10/8:  -he is on baseline of home oxygen requirement at 4 L per minute:   he is not agitated and is not in any resp distress:  cont current therapy:   10/9: seems comfortable;  on 3 L of oxygen : shei s alert and of confused;   10/10:  -seems OK: on 2-3 L of oxygen :  no wheezing:  no overnight events   10/11;  -on 4 L of oxygen ; she is doing  ok : no sob:  no coguh : no phlegm    -no sob:  no wheezing:  cont current rx:   10/12;  hermentals state remains the same;   no sob:  no cough ; no phlegm    -on 4 L of oxygen :  no wheezing:  no overnight events     Mild leukocytosis to 12k on admission  - infectious vs reactive. afebrile, not septic, hemodynamically stable.   -Without any new fevers,   -URI sxs, worsening of chronic cough, or increased O2 requirements  - Procal negative.  - cw abx as per ID  ; to cont for now;   -needs ct chest     9/29; -on no antibiotics   -afebrile today  ;  wbc is normal today     9/30: remains afebrile and wbc is normal   10/1: seems OK:   10/2: cont current care:   10/4:  leucocytosis resolved;   10/5:l resolved   10/6  seems eryn :  no sob:  on 5 L of oxygen    10/7;  -today on 4 L:  still satting good;  100%  10/8: cont 4 L of oxygen : no sob:  no cough :   10/9; cont current meds  10/10: Last wbc count on 6th was normal   10/11: no fever:      Advanced COPD.   -ow suspicion for COPD exacerbation, lung exam with localized rhonchi/coarse crackles over R>L base. No increased baseline O2 requirement or worsening of chronic cough  - continue home prednisone 10mg daily   - duonebs PRN.  -and his VBg also did not show any  significant retention ofpco2:     9/30:  -cont current rx:  he seems stable:   -ct chest showed; Extensive emphysematous changes. Bibasilar predominant peripheral reticular opacities, traction bronchiectasis, and subpleural cystic changes, compatible with   interstitial lung disease. Right lung pulmonary nodules, measuring up to 1.8 cm, concerning for neoplasia.  Consider PET scan or 3 month follow up. Small left pleural effusion with basilar pleural thickening. Partially imaged left parotid gland mass. Consider further evaluation with contrast-enhanced MR neck from more definitive characterization.    10/1:  dw daughter : she will decide about the pulm nodule:  and has parotid gland tumor ; work up per primary team     10/3: no workup per daughter   10/4:  pulm wise he seems stable:  no sob:  no wheezing   10/5: need to decrease the oxygen as documented above   10/6:  no wheezing:   no co2 retention  cont BD   10/7;  cont BD:  no wheezing:    10/8:  -remains stable:  pulm wise:   -he likely has malignant nodule:  daughter is aware:  probably would not do any further workup : but can repeat his ct scan chest in 6 weeks time   10/11: cont BD   10/12:  seems OK:  no sob:  no cough ; no phlegm      Chronic hypoxic respiratory failure.   ·  Plan: - continue home NC at 4L NC, goal 88-92&-likely due to copd   10/2; seems to be doing  ok :   10/03: cont 4 L of NC :  he is not wheezing:  vbg ordered   10/04;-VBG is very good:   10/5: VBG reasonable  no co2 retention:  reduce oxygen    10/6 : try to decrease oxygen down to 1 L  per minute or even off   10/7; cont to wean off oxygen    10/8: he is on baseline of 4 L of oxygen    10/9: cont wean down oxygen as possible  10/10:  she is still on 3-4 L of oxygen    10/12: on 4 L of nasal cannula      CAD (coronary artery disease).    - continue home statin, plavix.    -NEW aflutter   -echo showed;  1. Left ventricular systolic function is normal with an ejection fraction of 69 % by Alex's method of disks.   2. Enlarged right ventricular cavity size and reduced right ventricular systolic function.   3. Left atrium is normal in size.   4. The aortic valve appears trileaflet with reduced systolic excursion. There is moderate aortic stenosis. The peak transaortic velocity is 2.37 m/s, peak transaortic gradient is 22.5 mmHg and mean transaortic gradient is 11.0 mmHg with an LVOT/aortic valve VTI ratio of 0.39. The aortic valve area is estimated at 1.21 cm² by the continuity equation.   5. Estimated pulmonary artery systolic pressure is 57 mmHg, consistent with moderate pulmonary hypertension.  defer to cards     10/5L converted back to NSR : daughter refused for AC:        yancy acp

## 2024-10-12 NOTE — PROGRESS NOTE ADULT - ASSESSMENT
Patient seen and examined, agree with above assessment and plan as transcribed above.    - will need life long AC if not otherwise contraindicated and within goals of care    Jeb Way MD, [facility]  BEEPER [phone]

## 2024-10-12 NOTE — PROGRESS NOTE ADULT - SUBJECTIVE AND OBJECTIVE BOX
DATE OF SERVICE: 10-12-24    Patient denies chest pain or shortness of breath.   Review of symptoms otherwise negative.    acetaminophen     Tablet .. 650 milliGRAM(s) Oral every 6 hours PRN  albuterol/ipratropium for Nebulization 3 milliLiter(s) Nebulizer every 6 hours PRN  aluminum hydroxide/magnesium hydroxide/simethicone Suspension 30 milliLiter(s) Oral every 4 hours PRN  atorvastatin 10 milliGRAM(s) Oral at bedtime  buDESOnide    Inhalation Suspension 0.5 milliGRAM(s) Inhalation every 12 hours  clopidogrel Tablet 75 milliGRAM(s) Oral daily  diltiazem    milliGRAM(s) Oral daily  enoxaparin Injectable 40 milliGRAM(s) SubCutaneous every 24 hours  finasteride 5 milliGRAM(s) Oral daily  melatonin 3 milliGRAM(s) Oral at bedtime PRN  ondansetron Injectable 4 milliGRAM(s) IV Push every 8 hours PRN  predniSONE   Tablet 10 milliGRAM(s) Oral daily  senna 2 Tablet(s) Oral at bedtime  tamsulosin 0.4 milliGRAM(s) Oral at bedtime          Creatinine Trend: 0.81<--, 0.65<--, 0.74<--, 0.73<--, 0.86<--, 0.78<--    COAGS:           T(C): 36.5 (10-12-24 @ 12:02), Max: 36.6 (10-11-24 @ 22:00)  HR: 65 (10-12-24 @ 12:02) (65 - 84)  BP: 107/59 (10-12-24 @ 12:02) (107/59 - 110/58)  RR: 18 (10-12-24 @ 12:02) (18 - 18)  SpO2: 95% (10-12-24 @ 12:02) (95% - 98%)  Wt(kg): --    I&O's Summary      General: NAD  Head: Normocephalic and atraumatic.   Neck: No JVD. No bruits. Supple. Does not appear to be enlarged.   Cardiovascular: + S1,S2 ; RRR Soft systolic murmur at the left lower sternal border. No rubs noted.    Lungs: CTA b/l. No rhonchi, rales or wheezes.   Abdomen: + BS, soft. Non tender. Non distended. No rebound. No guarding.   Extremities: No clubbing/cyanosis/edema.   Neurologic: Moves all four extremities. Full range of motion.   Skin: Warm and moist. The patient's skin has normal elasticity and good skin turgor.   Psychiatric: cannot eval  Musculoskeletal: Normal range of motion, normal strength    DATA    tele- NSR 50s    ECG:  	AF 140s    < from: TTE W or WO Ultrasound Enhancing Agent (09.26.24 @ 15:20) >     CONCLUSIONS:      1. Left ventricular systolic function is normal with an ejection fraction of 69 % by Alex's method ofdisks.   2. Enlarged right ventricular cavity size and reduced right ventricular systolic function.   3. Left atrium is normal in size.   4. The aortic valve appears trileaflet with reduced systolic excursion. There is moderate aortic stenosis. The peak transaortic velocity is 2.37 m/s, peak transaortic gradient is 22.5 mmHg and mean transaortic gradient is 11.0 mmHg with an LVOT/aortic valve VTI ratio of 0.39. The aortic valve area is estimated at 1.21 cm² by the continuity equation.   5. Estimated pulmonary artery systolic pressure is 57 mmHg, consistent with moderate pulmonary hypertension.   6. No pericardial effusion seen.  < end of copied text >      ASSESSMENT/PLAN: 	  89M with PMH cognitive dysfunction (AAOx2 at baseline), advanced COPD on daily prednisone with chronic respiratory failure on 4L NC, CAD/MI s/p stent 15 years ago, HLD, and BPH BIBEMS from [facility] for confusion in setting of suspected pneumonia. Cardiology consulted for New onset Rapid Afib /?flutter today    --TTE noted with Normal LVEF, mod AS, mod Pulm HTN  --remains in SR on cardizem CD 120mg daily  --Ct head/chest noted--family does not want malignancy w/u, spoke with daughter states that he is fairly bedbound over last couple months and needs help with ADLs also reports that since his mental status has been waxing and waning would prefer to hold of AC for now and its bleeding risk         DATE OF SERVICE: 10-12-24    Patient denies chest pain or shortness of breath.   Review of symptoms otherwise negative.    acetaminophen     Tablet .. 650 milliGRAM(s) Oral every 6 hours PRN  albuterol/ipratropium for Nebulization 3 milliLiter(s) Nebulizer every 6 hours PRN  aluminum hydroxide/magnesium hydroxide/simethicone Suspension 30 milliLiter(s) Oral every 4 hours PRN  atorvastatin 10 milliGRAM(s) Oral at bedtime  buDESOnide    Inhalation Suspension 0.5 milliGRAM(s) Inhalation every 12 hours  clopidogrel Tablet 75 milliGRAM(s) Oral daily  diltiazem    milliGRAM(s) Oral daily  enoxaparin Injectable 40 milliGRAM(s) SubCutaneous every 24 hours  finasteride 5 milliGRAM(s) Oral daily  melatonin 3 milliGRAM(s) Oral at bedtime PRN  ondansetron Injectable 4 milliGRAM(s) IV Push every 8 hours PRN  predniSONE   Tablet 10 milliGRAM(s) Oral daily  senna 2 Tablet(s) Oral at bedtime  tamsulosin 0.4 milliGRAM(s) Oral at bedtime          Creatinine Trend: 0.81<--, 0.65<--, 0.74<--, 0.73<--, 0.86<--, 0.78<--    COAGS:           T(C): 36.5 (10-12-24 @ 12:02), Max: 36.6 (10-11-24 @ 22:00)  HR: 65 (10-12-24 @ 12:02) (65 - 84)  BP: 107/59 (10-12-24 @ 12:02) (107/59 - 110/58)  RR: 18 (10-12-24 @ 12:02) (18 - 18)  SpO2: 95% (10-12-24 @ 12:02) (95% - 98%)  Wt(kg): --    I&O's Summary      General: NAD  Head: Normocephalic and atraumatic.   Neck: No JVD. No bruits. Supple. Does not appear to be enlarged.   Cardiovascular: + S1,S2 ; RRR Soft systolic murmur at the left lower sternal border. No rubs noted.    Lungs: CTA b/l. No rhonchi, rales or wheezes.   Abdomen: + BS, soft. Non tender. Non distended. No rebound. No guarding.   Extremities: No clubbing/cyanosis/edema.   Neurologic: Moves all four extremities. Full range of motion.   Skin: Warm and moist. The patient's skin has normal elasticity and good skin turgor.   Psychiatric: cannot eval  Musculoskeletal: Normal range of motion, normal strength    DATA    tele- NSR 50s    ECG:  	AF 140s    < from: TTE W or WO Ultrasound Enhancing Agent (09.26.24 @ 15:20) >     CONCLUSIONS:      1. Left ventricular systolic function is normal with an ejection fraction of 69 % by Alex's method ofdisks.   2. Enlarged right ventricular cavity size and reduced right ventricular systolic function.   3. Left atrium is normal in size.   4. The aortic valve appears trileaflet with reduced systolic excursion. There is moderate aortic stenosis. The peak transaortic velocity is 2.37 m/s, peak transaortic gradient is 22.5 mmHg and mean transaortic gradient is 11.0 mmHg with an LVOT/aortic valve VTI ratio of 0.39. The aortic valve area is estimated at 1.21 cm² by the continuity equation.   5. Estimated pulmonary artery systolic pressure is 57 mmHg, consistent with moderate pulmonary hypertension.   6. No pericardial effusion seen.  < end of copied text >      ASSESSMENT/PLAN: 	  89M with PMH cognitive dysfunction (AAOx2 at baseline), advanced COPD on daily prednisone with chronic respiratory failure on 4L NC, CAD/MI s/p stent 15 years ago, HLD, and BPH BIBEMS from [facility] for confusion in setting of suspected pneumonia. Cardiology consulted for New onset Rapid Afib /?flutter today    --TTE noted with Normal LVEF, mod AS, mod Pulm HTN  --remains in SR on cardizem CD 120mg daily  --Ct head/chest noted--family does not want malignancy w/u, spoke with daughter states that he is fairly bedbound over last couple months and needs help with ADLs also reports that since his mental status has been waxing and waning would prefer to hold of AC for now and its bleeding risk    Jeb Way MD, [facility]  BEEPER [phone]

## 2024-10-12 NOTE — PROGRESS NOTE ADULT - ASSESSMENT
89M with PMH cognitive dysfunction (AAOx2 at baseline), advanced COPD on daily prednisone with chronic respiratory failure on 4L NC, CAD/MI s/p stent, HLD, and BPH BIBEMS from [facility] for confusion x 2 days. Chest imaging suggestive of pulmonary edema vs pneumonia.         Problem/Plan - 1:  ·  Problem: Acute metabolic encephalopathy.   -likely worsening dementia   - frequent reorientation-    Problem/Plan - 2:·  Problem: Leukocytosis.   resolved   ID following       Problem/Plan - 3:·  Problem: Advanced COPD. ·  Plan: low suspicion for COPD exacerbation, lung exam with localized rhonchi/coarse crackles over R>L base. No increased baseline O2 requirement or worsening of chronic cough  - continue home prednisone 10mg daily   - duonebs PRN.  pul following     Problem/Plan - 4:  ·  Problem: lung nodule : could be malignancy  ·  Plan: - CT reviewed  - Family does not want w/u or treatment   - family wants comfort measures only     Problem/Plan - 5:  ·  Problem: CAD (coronary artery disease).   ·  Plan: - continue home statin, plavix.    Problem/Plan - 6:  ·  Problem: BPH (benign prostatic hyperplasia).   ·  Plan: - continue home finasteride and tamsulosin.    Problem/Plan - 7:·  Problem: new aflutter   ·  Plan: cw tele  started on cardizem echo : nml EF , likely diastolic failuire   seen by EP cardiology   HR is controlled   not candidate for any AC : 2/2 agitation and fall risk     dw sw, awaiting LTC

## 2024-10-13 RX ADMIN — Medication 3 MILLIGRAM(S): at 21:13

## 2024-10-13 RX ADMIN — ATORVASTATIN CALCIUM 10 MILLIGRAM(S): 10 TABLET, FILM COATED ORAL at 21:13

## 2024-10-13 RX ADMIN — Medication 120 MILLIGRAM(S): at 05:26

## 2024-10-13 RX ADMIN — Medication 0.4 MILLIGRAM(S): at 21:13

## 2024-10-13 RX ADMIN — Medication 0.5 MILLIGRAM(S): at 21:04

## 2024-10-13 RX ADMIN — Medication 75 MILLIGRAM(S): at 12:02

## 2024-10-13 RX ADMIN — PREDNISONE 10 MILLIGRAM(S): 5 TABLET ORAL at 05:26

## 2024-10-13 RX ADMIN — ENOXAPARIN SODIUM 40 MILLIGRAM(S): 150 INJECTION SUBCUTANEOUS at 12:02

## 2024-10-13 RX ADMIN — FINASTERIDE 5 MILLIGRAM(S): 5 TABLET, FILM COATED ORAL at 12:02

## 2024-10-13 NOTE — PROGRESS NOTE ADULT - SUBJECTIVE AND OBJECTIVE BOX
DATE OF SERVICE: 10-13-24    Patient denies chest pain or shortness of breath.   Review of symptoms otherwise negative.    acetaminophen     Tablet .. 650 milliGRAM(s) Oral every 6 hours PRN  albuterol/ipratropium for Nebulization 3 milliLiter(s) Nebulizer every 6 hours PRN  aluminum hydroxide/magnesium hydroxide/simethicone Suspension 30 milliLiter(s) Oral every 4 hours PRN  atorvastatin 10 milliGRAM(s) Oral at bedtime  buDESOnide    Inhalation Suspension 0.5 milliGRAM(s) Inhalation every 12 hours  clopidogrel Tablet 75 milliGRAM(s) Oral daily  diltiazem    milliGRAM(s) Oral daily  enoxaparin Injectable 40 milliGRAM(s) SubCutaneous every 24 hours  finasteride 5 milliGRAM(s) Oral daily  melatonin 3 milliGRAM(s) Oral at bedtime PRN  ondansetron Injectable 4 milliGRAM(s) IV Push every 8 hours PRN  predniSONE   Tablet 10 milliGRAM(s) Oral daily  senna 2 Tablet(s) Oral at bedtime  tamsulosin 0.4 milliGRAM(s) Oral at bedtime                    Creatinine Trend: 0.81<--, 0.65<--, 0.74<--, 0.73<--, 0.86<--, 0.78<--    COAGS:           T(C): 36.6 (10-13-24 @ 12:37), Max: 37.1 (10-13-24 @ 05:26)  HR: 65 (10-13-24 @ 12:37) (65 - 93)  BP: 107/58 (10-13-24 @ 12:37) (107/58 - 138/65)  RR: 17 (10-13-24 @ 12:37) (17 - 18)  SpO2: 96% (10-13-24 @ 12:37) (96% - 97%)  Wt(kg): --    I&O's Summary    12 Oct 2024 07:01  -  13 Oct 2024 07:00  --------------------------------------------------------  IN: 660 mL / OUT: 760 mL / NET: -100 mL        General: NAD  Head: Normocephalic and atraumatic.   Neck: No JVD. No bruits. Supple. Does not appear to be enlarged.   Cardiovascular: + S1,S2 ; RRR Soft systolic murmur at the left lower sternal border. No rubs noted.    Lungs: CTA b/l. No rhonchi, rales or wheezes.   Abdomen: + BS, soft. Non tender. Non distended. No rebound. No guarding.   Extremities: No clubbing/cyanosis/edema.   Neurologic: Moves all four extremities. Full range of motion.   Skin: Warm and moist. The patient's skin has normal elasticity and good skin turgor.   Psychiatric: cannot eval  Musculoskeletal: Normal range of motion, normal strength    DATA    tele- NSR 50s    ECG:  	AF 140s    < from: TTE W or WO Ultrasound Enhancing Agent (09.26.24 @ 15:20) >     CONCLUSIONS:      1. Left ventricular systolic function is normal with an ejection fraction of 69 % by Alex's method ofdisks.   2. Enlarged right ventricular cavity size and reduced right ventricular systolic function.   3. Left atrium is normal in size.   4. The aortic valve appears trileaflet with reduced systolic excursion. There is moderate aortic stenosis. The peak transaortic velocity is 2.37 m/s, peak transaortic gradient is 22.5 mmHg and mean transaortic gradient is 11.0 mmHg with an LVOT/aortic valve VTI ratio of 0.39. The aortic valve area is estimated at 1.21 cm² by the continuity equation.   5. Estimated pulmonary artery systolic pressure is 57 mmHg, consistent with moderate pulmonary hypertension.   6. No pericardial effusion seen.  < end of copied text >      ASSESSMENT/PLAN: 	  89M with PMH cognitive dysfunction (AAOx2 at baseline), advanced COPD on daily prednisone with chronic respiratory failure on 4L NC, CAD/MI s/p stent 15 years ago, HLD, and BPH BIBEMS from [facility] for confusion in setting of suspected pneumonia. Cardiology consulted for New onset Rapid Afib /?flutter today    --TTE noted with Normal LVEF, mod AS, mod Pulm HTN  --remains in SR on cardizem CD 120mg daily  --Ct head/chest noted--family does not want malignancy w/u, spoke with daughter states that he is fairly bedbound over last couple months and needs help with ADLs also reports that since his mental status has been waxing and waning would prefer to hold of AC for now and its bleeding risk  - EP f/u appreciated    Jeb Way MD, [facility]  BEEPER [phone]

## 2024-10-13 NOTE — PROGRESS NOTE ADULT - SUBJECTIVE AND OBJECTIVE BOX
Date of Service: 10-13-24 @ 10:22    Patient is a 89y old  Male who presents with a chief complaint of acute metabolic encephalopathy (13 Oct 2024 08:33)      Any change in ROS: on 3 L of oxygen      MEDICATIONS  (STANDING):  atorvastatin 10 milliGRAM(s) Oral at bedtime  buDESOnide    Inhalation Suspension 0.5 milliGRAM(s) Inhalation every 12 hours  clopidogrel Tablet 75 milliGRAM(s) Oral daily  diltiazem    milliGRAM(s) Oral daily  enoxaparin Injectable 40 milliGRAM(s) SubCutaneous every 24 hours  finasteride 5 milliGRAM(s) Oral daily  predniSONE   Tablet 10 milliGRAM(s) Oral daily  senna 2 Tablet(s) Oral at bedtime  tamsulosin 0.4 milliGRAM(s) Oral at bedtime    MEDICATIONS  (PRN):  acetaminophen     Tablet .. 650 milliGRAM(s) Oral every 6 hours PRN Temp greater or equal to 38C (100.4F), Mild Pain (1 - 3)  albuterol/ipratropium for Nebulization 3 milliLiter(s) Nebulizer every 6 hours PRN Shortness of Breath and/or Wheezing  aluminum hydroxide/magnesium hydroxide/simethicone Suspension 30 milliLiter(s) Oral every 4 hours PRN Dyspepsia  melatonin 3 milliGRAM(s) Oral at bedtime PRN Insomnia  ondansetron Injectable 4 milliGRAM(s) IV Push every 8 hours PRN Nausea and/or Vomiting    Vital Signs Last 24 Hrs  T(C): 37.1 (13 Oct 2024 05:26), Max: 37.1 (13 Oct 2024 05:26)  T(F): 98.8 (13 Oct 2024 05:26), Max: 98.8 (13 Oct 2024 05:26)  HR: 93 (13 Oct 2024 05:26) (65 - 93)  BP: 134/68 (13 Oct 2024 05:26) (107/59 - 138/65)  BP(mean): --  RR: 17 (13 Oct 2024 05:26) (17 - 18)  SpO2: 97% (13 Oct 2024 05:26) (95% - 97%)    Parameters below as of 13 Oct 2024 05:26  Patient On (Oxygen Delivery Method): nasal cannula  O2 Flow (L/min): 3      I&O's Summary    12 Oct 2024 07:01  -  13 Oct 2024 07:00  --------------------------------------------------------  IN: 660 mL / OUT: 760 mL / NET: -100 mL          Physical Exam:   GENERAL: NAD, well-groomed, well-developed  HEENT: SUGEY/   Atraumatic, Normocephalic  ENMT: No tonsillar erythema, exudates, or enlargement; Moist mucous membranes, Good dentition, No lesions  NECK: Supple, No JVD, Normal thyroid  CHEST/LUNG: Clear to auscultaion  CVS: Regular rate and rhythm; No murmurs, rubs, or gallops  GI: : Soft, Nontender, Nondistended; Bowel sounds present  NERVOUS SYSTEM:  Alert & Oriented X1  EXTREMITIES:  - edema  LYMPH: No lymphadenopathy noted  SKIN: No rashes or lesions  ENDOCRINOLOGY: No Thyromegaly  PSYCH: confused     Labs:                CAPILLARY BLOOD GLUCOSE      rad< from: CT Head No Cont (09.29.24 @ 13:10) >  location, and more peripheral 1.1 cm nodule that may be another tumor or   lymph node.      IMPRESSION:    No acute intracranial findings.    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 ENT referral is advised, and   further imaging with neck CT or MRI can be done as outpatient and tissue   sampling under ultrasound guidance.    --- End of Report ---          EMMA GUERRERO MD; Resident Radiologist  This document has been electronically signed.  OPAL RAMOS MD; Attending Radiologist  This document has been electronically signed. Sep 29 2024  1:25PM    < end of copied text >  < from: CT Chest No Cont (09.29.24 @ 13:10) >    IMPRESSION:    Extensive emphysematous changes.    Bibasilar predominant peripheral reticular opacities, traction   bronchiectasis, and subpleural cystic changes, compatible with   interstitial lung disease.    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  Consider PET scan or 3 month follow up.    Small left pleural effusion with basilar pleural thickening.    Partially imaged left parotid gland mass. Consider further evaluation   with contrast-enhanced MR neck from more definitive characterization.    --- End of Report ---    < end of copied text >                  RECENT CULTURES:        RESPIRATORY CULTURES:          Studies  Chest X-RAY  CT SCAN Chest   Venous Dopplers: LE:   CT Abdomen  Others

## 2024-10-13 NOTE — PROGRESS NOTE ADULT - ASSESSMENT
89M with PMH cognitive dysfunction (AAOx2 at baseline), advanced COPD on daily prednisone with chronic respiratory failure on 4L NC, CAD/MI s/p stent, HLD, and BPH BIBEMS from [facility] for confusion x 2 days. Chest imaging suggestive of pulmonary edema vs pneumonia.         Problem/Plan - 1:  ·  Problem: Acute metabolic encephalopathy.   -likely worsening dementia   - frequent reorientation-    Problem/Plan - 2:·  Problem: Leukocytosis.   resolved   ID following       Problem/Plan - 3:·  Problem: Advanced COPD. ·  Plan: low suspicion for COPD exacerbation, lung exam with localized rhonchi/coarse crackles over R>L base. No increased baseline O2 requirement or worsening of chronic cough  - continue home prednisone 10mg daily   - duonebs PRN.  pul following     Problem/Plan - 4:  ·  Problem: lung nodule : could be malignancy  ·  Plan: - CT reviewed  - Family does not want w/u or treatment   - family wants comfort measures only     Problem/Plan - 5:  ·  Problem: CAD (coronary artery disease).   ·  Plan: - continue home statin, plavix.    Problem/Plan - 6:  ·  Problem: BPH (benign prostatic hyperplasia).   ·  Plan: - continue home finasteride and tamsulosin.    Problem/Plan - 7:·  Problem: new aflutter   ·  Plan: cw tele  started on cardizem echo : nml EF , likely diastolic failuire seen by EP cardiology   HR is controlled   not candidate for any AC : 2/2 agitation and fall risk     dw sw, awaiting LTC

## 2024-10-13 NOTE — PROGRESS NOTE ADULT - SUBJECTIVE AND OBJECTIVE BOX
EP     HISTORY OF PRESENT ILLNESS: HPI:  89M with PMH cognitive dysfunction (AAOx2 at baseline), advanced COPD on daily prednisone with chronic respiratory failure on 4L NC, CAD/MI s/p stent, HLD, and BPH BIBEMS from [facility] for confusion in setting of suspected pneumonia. Pt does not know why he was brought to the hospital, unable to provide much history. Collateral obtained from daughter/HCP Meghan Gonzalez. Pt noted to have intermittent episodes of confusion by NH staff over the past 2 days, occasionally become agitated, saying nonsensical things, all unlike him. Pt is AAOx2 at baseline, would not know the year, but otherwise conversational. Whenever he was visited by daughter or son though, he was found to be at his baseline. He had a UA done that was unremarkable and a CXR that showed "pulmonary vascular congestion with patchy bibasilar infriltrates"- copy in pt's chart. And he was started on doxycycline yesterday, unclear how many doses he took. Pt with a chronic cough, did not notice anything like increased cough or sputum production from baseline. No witnessed episodes of shortness of breath, no increase in baseline oxygen requirement. No known fevers. Meghan believes patient has a history of heart failure and takes lasix regularly, however, heart failure not listed as a diagnosis from NH or [facility] outpatient pulm notes, lasix also not listed on medication list from NH or surescripts.   Pt himself currently feels well. Denies having any trouble breathing, chest pain, abdominal pain, or any other complaints.      10/11 no new complaints    10/12/24  pt resting in bed , family at bedside. no distress   Date of service  10/13  no complaints this morning.             acetaminophen     Tablet .. 650 milliGRAM(s) Oral every 6 hours PRN  albuterol/ipratropium for Nebulization 3 milliLiter(s) Nebulizer every 6 hours PRN  aluminum hydroxide/magnesium hydroxide/simethicone Suspension 30 milliLiter(s) Oral every 4 hours PRN  atorvastatin 10 milliGRAM(s) Oral at bedtime  buDESOnide    Inhalation Suspension 0.5 milliGRAM(s) Inhalation every 12 hours  clopidogrel Tablet 75 milliGRAM(s) Oral daily  diltiazem    milliGRAM(s) Oral daily  enoxaparin Injectable 40 milliGRAM(s) SubCutaneous every 24 hours  finasteride 5 milliGRAM(s) Oral daily  melatonin 3 milliGRAM(s) Oral at bedtime PRN  ondansetron Injectable 4 milliGRAM(s) IV Push every 8 hours PRN  predniSONE   Tablet 10 milliGRAM(s) Oral daily  senna 2 Tablet(s) Oral at bedtime  tamsulosin 0.4 milliGRAM(s) Oral at bedtime                    Creatinine Trend: 0.81<--, 0.65<--, 0.74<--, 0.73<--, 0.86<--, 0.78<--    COAGS:           T(C): 37.1 (10-13-24 @ 05:26), Max: 37.1 (10-13-24 @ 05:26)  HR: 93 (10-13-24 @ 05:26) (65 - 93)  BP: 134/68 (10-13-24 @ 05:26) (107/59 - 138/65)  RR: 17 (10-13-24 @ 05:26) (17 - 18)  SpO2: 97% (10-13-24 @ 05:26) (95% - 97%)  Wt(kg): --    I&O's Summary    12 Oct 2024 07:01  -  13 Oct 2024 07:00  --------------------------------------------------------  IN: 660 mL / OUT: 760 mL / NET: -100 mL      Appearance: elderly man, sleepy and confused.	  HEENT:   Normal oral mucosa, PERRL, EOMI	  Lymphatic: No lymphadenopathy , no edema  Cardiovascular: Normal S1 S2, No JVD, No murmurs , Peripheral pulses palpable 2+ bilaterally  Respiratory: Lungs clear to auscultation, normal effort 	  Gastrointestinal:  Soft, Non-tender, + BS	  Skin: No rashes, No ecchymoses, No cyanosis, warm to touch  Musculoskeletal: Normal range of motion, normal strength  Psychiatry:  Mood & affect appropriate    TELEMETRY: AFib/AFlutter	    Echo: < from: TTE W or WO Ultrasound Enhancing Agent (09.26.24 @ 15:20) >     1. Left ventricular systolic function is normal with an ejection fraction of 69 % by Alex's method ofdisks.   2. Enlargd right ventricular cavity size and reduced right ventricular systolic function.   3. Left atrium is normal in size.   4. The aortic valve appears trileaflet with reduced systolic excursion. There is moderate aortic stenosis. The peak transaortic velocity is 2.37 m/s, peak transaortic gradient is 22.5 mmHg and mean transaortic gradient is 11.0 mmHg with an LVOT/aortic valve VTI ratio of 0.39. The aortic valve area is estimated at 1.21 cm² by the continuity equation.   5. Estimated pulmonary artery systolic pressure is 57 mmHg, consistent with moderate pulmonary hypertension.   6. No pericardial effusion seen.    < end of copied text >  	  ASSESSMENT/PLAN: Mr Barajas is a 89y Male here from nursing home w/ shortness of breath.  Has COPD, may have superimposed pneumonia.  New dx of atrial arrhythmia, may be causing acute diastolic heart failure.  Continue AV aniket blockade w/ diltiazem.  Heartrate is adequately controlled now.  Backup plan is a Digoxin load.  Awaiting remainder of workup and goals of care before starting oral anticoagulation for stroke prevention.

## 2024-10-13 NOTE — PROGRESS NOTE ADULT - ASSESSMENT
89M with PMH cognitive dysfunction (AAOx2 at baseline), advanced COPD on daily prednisone with chronic respiratory failure on 4L NC, CAD/MI s/p stent, HLD, and BPH BIBEMS from [facility] for confusion in setting of suspected pneumonia. Pt does not know why he was brought to the hospital, unable to provide much history. Collateral obtained from daughter/HCP Meghan Gonzalez. Pt noted to have intermittent episodes of confusion by NH staff over the past 2 days, occasionally become agitated, saying nonsensical things, all unlike him. Pt is AAOx2 at baseline, would not know the year, but otherwise conversational. Whenever he was visited by daughter or son though, he was found to be at his baseline. He had a UA done that was unremarkable and a CXR that showed "pulmonary vascular congestion with patchy bibasilar infriltrates"- copy in pt's chart. And he was started on doxycycline yesterday, unclear how many doses he took. Pt with a chronic cough, did not notice anything like increased cough or sputum production from baseline. No witnessed episodes of shortness of breath, no increase in baseline oxygen requirement. No known fevers. Meghan believes patient has a history of heart failure and takes lasix regularly, however, heart failure not listed as a diagnosis from NH or [facility] outpatient pulm notes, lasix also not listed on medication list from NH or surescripts.   Pt himself currently feels well. Denies having any trouble breathing, chest pain, abdominal pain, or any other complaints.     Acute metabolic encephalopathy.   -check repeat CT head -NOT DONE YET;  WAS CANCELLED   - could be related to infection?  pneumonia,  uti etc:  being rule dout  - his VBG is fine with no retention of co2:  so thats not the reason of him getting more confused;     9/29;  -he is still confused?  baseline not sure;   -he is not wheezing;  awaiting ct chest   -heis still on 5 L:  try to bring down ; sao2 gaol is 88%; ex smoker     9/30: he is still  pretty confused;    -on chr prednisone for copd;   he is not wheezing    10/1; seems to be doing  ok ;on 4 L of oxygen :  10/2: on 4 L of oxygen ; try to minimise o x as tolerated  10/3:  called daughter : she is aware of him having malignancy probably:   -on prednisone  -daughter does not to work up for malignancy  :  10/4:  -he remains OK;  les acitated today  ;  alert and awke:   no resp distress:  no wheezing:  for malignancy as above   10/5  -he seems comfortable:   -he is on 6 L of oxygen : need to decrease:  the goal of o2 saturation is arround 885 in his copd pt: he does not need this much of oxygen    -cont BD   -he is on chr 10 mg of prednisone  -he is delirious too :   10/6:  seems OK:  still on 5 L : despite the goal of saturation is 88%: had dw acp yesterday:  I believe the oxygen can still be lowered to 2 L per minute and further  cont BD   10/8:  -he is on baseline of home oxygen requirement at 4 L per minute:   he is not agitated and is not in any resp distress:  cont current therapy:   10/9: seems comfortable;  on 3 L of oxygen : shei s alert and of confused;   10/10:  -seems OK: on 2-3 L of oxygen :  no wheezing:  no overnight events   10/11;  -on 4 L of oxygen ; she is doing  ok : no sob:  no coguh : no phlegm    -no sob:  no wheezing:  cont current rx:   10/12;  her mentals state remains the same;   no sob:  no cough ; no phlegm    -on 4 L of oxygen :  no wheezing:  no overnight events   10/13:  seems same;  alert and awake and confused:  on 3 L of oxygen   -no resp distress:  no wheezing   seems comfortable pulm wise     Mild leukocytosis to 12k on admission  - infectious vs reactive. afebrile, not septic, hemodynamically stable.   -Without any new fevers,   -URI sxs, worsening of chronic cough, or increased O2 requirements  - Procal negative.  - cw abx as per ID  ; to cont for now;   -needs ct chest     9/29; -on no antibiotics   -afebrile today  ;  wbc is normal today     9/30: remains afebrile and wbc is normal   10/1: seems OK:   10/2: cont current care:   10/4:  leucocytosis resolved;   10/5:l resolved   10/6  seems eryn :  no sob:  on 5 L of oxygen    10/7;  -today on 4 L:  still satting good;  100%  10/8: cont 4 L of oxygen : no sob:  no cough :   10/9; cont current meds  10/10: Last wbc count on 6th was normal   10/11: no fever:    10/163: WBC is normal      Advanced COPD.   -ow suspicion for COPD exacerbation, lung exam with localized rhonchi/coarse crackles over R>L base. No increased baseline O2 requirement or worsening of chronic cough  - continue home prednisone 10mg daily   - duonebs PRN.  -and his VBg also did not show any  significant retention ofpco2:     9/30:  -cont current rx:  he seems stable:   -ct chest showed; Extensive emphysematous changes. Bibasilar predominant peripheral reticular opacities, traction bronchiectasis, and subpleural cystic changes, compatible with   interstitial lung disease. Right lung pulmonary nodules, measuring up to 1.8 cm, concerning for neoplasia.  Consider PET scan or 3 month follow up. Small left pleural effusion with basilar pleural thickening. Partially imaged left parotid gland mass. Consider further evaluation with contrast-enhanced MR neck from more definitive characterization.    10/1:  dw daughter : she will decide about the pulm nodule:  and has parotid gland tumor ; work up per primary team     10/3: no workup per daughter   10/4:  pulm wise he seems stable:  no sob:  no wheezing   10/5: need to decrease the oxygen as documented above   10/6:  no wheezing:   no co2 retention  cont BD   10/7;  cont BD:  no wheezing:    10/8:  -remains stable:  pulm wise:   -he likely has malignant nodule:  daughter is aware:  probably would not do any further workup : but can repeat his ct scan chest in 6 weeks time   10/11: cont BD   10/12:  seems OK:  no sob:  no cough ; no phlegm    10/13: cont current care:     Chronic hypoxic respiratory failure.   ·  Plan: - continue home NC at 4L NC, goal 88-92&-likely due to copd   10/2; seems to be doing  ok :   10/03: cont 4 L of NC :  he is not wheezing:  vbg ordered   10/04;-VBG is very good:   10/5: VBG reasonable  no co2 retention:  reduce oxygen    10/6 : try to decrease oxygen down to 1 L  per minute or even off   10/7; cont to wean off oxygen    10/8: he is on baseline of 4 L of oxygen    10/9: cont wean down oxygen as possible  10/10:  she is still on 3-4 L of oxygen    10/12: on 4 L of nasal cannula    10/13: on 3 L of oxygen : can try to decrease in AM     CAD (coronary artery disease).    - continue home statin, plavix.    -NEW aflutter   -echo showed;  1. Left ventricular systolic function is normal with an ejection fraction of 69 % by Alex's method of disks.   2. Enlarged right ventricular cavity size and reduced right ventricular systolic function.   3. Left atrium is normal in size.   4. The aortic valve appears trileaflet with reduced systolic excursion. There is moderate aortic stenosis. The peak transaortic velocity is 2.37 m/s, peak transaortic gradient is 22.5 mmHg and mean transaortic gradient is 11.0 mmHg with an LVOT/aortic valve VTI ratio of 0.39. The aortic valve area is estimated at 1.21 cm² by the continuity equation.   5. Estimated pulmonary artery systolic pressure is 57 mmHg, consistent with moderate pulmonary hypertension.  defer to cards     10/5L converted back to NSR : daughter refused for AC:        yancy acp

## 2024-10-13 NOTE — PROGRESS NOTE ADULT - SUBJECTIVE AND OBJECTIVE BOX
Patient is a 89y old  Male who presents with a chief complaint of acute metabolic encephalopathy (13 Oct 2024 10:22)    Date of servie : 10-13-24 @ 18:52  INTERVAL HPI/OVERNIGHT EVENTS:  T(C): 36.6 (10-13-24 @ 12:37), Max: 37.1 (10-13-24 @ 05:26)  HR: 65 (10-13-24 @ 12:37) (65 - 93)  BP: 107/58 (10-13-24 @ 12:37) (107/58 - 138/65)  RR: 17 (10-13-24 @ 12:37) (17 - 18)  SpO2: 96% (10-13-24 @ 12:37) (96% - 97%)  Wt(kg): --  I&O's Summary    12 Oct 2024 07:01  -  13 Oct 2024 07:00  --------------------------------------------------------  IN: 660 mL / OUT: 760 mL / NET: -100 mL        LABS:              CAPILLARY BLOOD GLUCOSE                MEDICATIONS  (STANDING):  atorvastatin 10 milliGRAM(s) Oral at bedtime  buDESOnide    Inhalation Suspension 0.5 milliGRAM(s) Inhalation every 12 hours  clopidogrel Tablet 75 milliGRAM(s) Oral daily  diltiazem    milliGRAM(s) Oral daily  enoxaparin Injectable 40 milliGRAM(s) SubCutaneous every 24 hours  finasteride 5 milliGRAM(s) Oral daily  predniSONE   Tablet 10 milliGRAM(s) Oral daily  senna 2 Tablet(s) Oral at bedtime  tamsulosin 0.4 milliGRAM(s) Oral at bedtime    MEDICATIONS  (PRN):  acetaminophen     Tablet .. 650 milliGRAM(s) Oral every 6 hours PRN Temp greater or equal to 38C (100.4F), Mild Pain (1 - 3)  albuterol/ipratropium for Nebulization 3 milliLiter(s) Nebulizer every 6 hours PRN Shortness of Breath and/or Wheezing  aluminum hydroxide/magnesium hydroxide/simethicone Suspension 30 milliLiter(s) Oral every 4 hours PRN Dyspepsia  melatonin 3 milliGRAM(s) Oral at bedtime PRN Insomnia  ondansetron Injectable 4 milliGRAM(s) IV Push every 8 hours PRN Nausea and/or Vomiting          PHYSICAL EXAM:  GENERAL: NAD, well-groomed, well-developed  HEAD:  Atraumatic, Normocephalic  CHEST/LUNG: Clear to percussion bilaterally; No rales, rhonchi, wheezing, or rubs  HEART: Regular rate and rhythm; No murmurs, rubs, or gallops  ABDOMEN: Soft, Nontender, Nondistended; Bowel sounds present  EXTREMITIES:  2+ Peripheral Pulses, No clubbing, cyanosis, or edema  LYMPH: No lymphadenopathy noted  SKIN: No rashes or lesions    Care Discussed with Consultants/Other Providers [ ] YES  [ ] NO

## 2024-10-14 RX ADMIN — Medication 0.4 MILLIGRAM(S): at 21:32

## 2024-10-14 RX ADMIN — Medication 75 MILLIGRAM(S): at 08:35

## 2024-10-14 RX ADMIN — Medication 2 TABLET(S): at 21:33

## 2024-10-14 RX ADMIN — FINASTERIDE 5 MILLIGRAM(S): 5 TABLET, FILM COATED ORAL at 08:35

## 2024-10-14 RX ADMIN — ENOXAPARIN SODIUM 40 MILLIGRAM(S): 150 INJECTION SUBCUTANEOUS at 08:35

## 2024-10-14 RX ADMIN — Medication 3 MILLIGRAM(S): at 23:52

## 2024-10-14 RX ADMIN — PREDNISONE 10 MILLIGRAM(S): 5 TABLET ORAL at 08:35

## 2024-10-14 RX ADMIN — Medication 120 MILLIGRAM(S): at 08:35

## 2024-10-14 RX ADMIN — ATORVASTATIN CALCIUM 10 MILLIGRAM(S): 10 TABLET, FILM COATED ORAL at 21:33

## 2024-10-14 RX ADMIN — Medication 0.5 MILLIGRAM(S): at 09:07

## 2024-10-14 RX ADMIN — Medication 0.5 MILLIGRAM(S): at 20:41

## 2024-10-14 NOTE — PROGRESS NOTE ADULT - SUBJECTIVE AND OBJECTIVE BOX
Date of Service: 10-14-24 @ 10:11    Patient is a 89y old  Male who presents with a chief complaint of acute metabolic encephalopathy (14 Oct 2024 09:32)      Any change in ROS: seems to be doing  ok ; on 3 L of oxygen  ;     MEDICATIONS  (STANDING):  atorvastatin 10 milliGRAM(s) Oral at bedtime  buDESOnide    Inhalation Suspension 0.5 milliGRAM(s) Inhalation every 12 hours  clopidogrel Tablet 75 milliGRAM(s) Oral daily  diltiazem    milliGRAM(s) Oral daily  enoxaparin Injectable 40 milliGRAM(s) SubCutaneous every 24 hours  finasteride 5 milliGRAM(s) Oral daily  predniSONE   Tablet 10 milliGRAM(s) Oral daily  senna 2 Tablet(s) Oral at bedtime  tamsulosin 0.4 milliGRAM(s) Oral at bedtime    MEDICATIONS  (PRN):  acetaminophen     Tablet .. 650 milliGRAM(s) Oral every 6 hours PRN Temp greater or equal to 38C (100.4F), Mild Pain (1 - 3)  albuterol/ipratropium for Nebulization 3 milliLiter(s) Nebulizer every 6 hours PRN Shortness of Breath and/or Wheezing  aluminum hydroxide/magnesium hydroxide/simethicone Suspension 30 milliLiter(s) Oral every 4 hours PRN Dyspepsia  melatonin 3 milliGRAM(s) Oral at bedtime PRN Insomnia  ondansetron Injectable 4 milliGRAM(s) IV Push every 8 hours PRN Nausea and/or Vomiting    Vital Signs Last 24 Hrs  T(C): 36.7 (14 Oct 2024 05:15), Max: 36.7 (13 Oct 2024 20:09)  T(F): 98.1 (14 Oct 2024 05:15), Max: 98.1 (14 Oct 2024 05:15)  HR: 61 (14 Oct 2024 05:15) (61 - 71)  BP: 114/53 (14 Oct 2024 05:15) (107/58 - 114/53)  BP(mean): --  RR: 18 (14 Oct 2024 05:15) (17 - 18)  SpO2: 98% (14 Oct 2024 05:15) (96% - 98%)    Parameters below as of 14 Oct 2024 05:15  Patient On (Oxygen Delivery Method): nasal cannula  O2 Flow (L/min): 3      I&O's Summary        Physical Exam:   GENERAL: NAD, well-groomed, well-developed  HEENT: SUGEY/   Atraumatic, Normocephalic  ENMT: No tonsillar erythema, exudates, or enlargement; Moist mucous membranes, Good dentition, No lesions  NECK: Supple, No JVD, Normal thyroid  CHEST/LUNG: Clear to auscultaion  CVS: Regular rate and rhythm; No murmurs, rubs, or gallops  GI: : Soft, Nontender, Nondistended; Bowel sounds present  NERVOUS SYSTEM:  Alert & Oriented X2  EXTREMITIES:  -edema  LYMPH: No lymphadenopathy noted  SKIN: No rashes or lesions  ENDOCRINOLOGY: No Thyromegaly  PSYCH: Appropriate    Labs:                CAPILLARY BLOOD GLUCOSE            rad< from: CT Chest No Cont (09.29.24 @ 13:10) >  right thyroid lobe hypoattenuating nodule (2-39). 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    IMPRESSION:    Extensive emphysematous changes.    Bibasilar predominant peripheral reticular opacities, traction   bronchiectasis, and subpleural cystic changes, compatible with   interstitial lung disease.    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  Consider PET scan or 3 month follow up.    Small left pleural effusion with basilar pleural thickening.    Partially imaged left parotid gland mass. Consider further evaluation   with contrast-enhanced MR neck from more definitive characterization.    --- End of Report ---    < end of copied text >            RECENT CULTURES:        RESPIRATORY CULTURES:          Studies  Chest X-RAY  CT SCAN Chest   Venous Dopplers: LE:   CT Abdomen  Others

## 2024-10-14 NOTE — PROGRESS NOTE ADULT - SUBJECTIVE AND OBJECTIVE BOX
DATE OF SERVICE: 10-14-24     No overnight events    MEDICATIONS:  acetaminophen     Tablet .. 650 milliGRAM(s) Oral every 6 hours PRN  albuterol/ipratropium for Nebulization 3 milliLiter(s) Nebulizer every 6 hours PRN  aluminum hydroxide/magnesium hydroxide/simethicone Suspension 30 milliLiter(s) Oral every 4 hours PRN  atorvastatin 10 milliGRAM(s) Oral at bedtime  buDESOnide    Inhalation Suspension 0.5 milliGRAM(s) Inhalation every 12 hours  clopidogrel Tablet 75 milliGRAM(s) Oral daily  diltiazem    milliGRAM(s) Oral daily  enoxaparin Injectable 40 milliGRAM(s) SubCutaneous every 24 hours  finasteride 5 milliGRAM(s) Oral daily  melatonin 3 milliGRAM(s) Oral at bedtime PRN  ondansetron Injectable 4 milliGRAM(s) IV Push every 8 hours PRN  predniSONE   Tablet 10 milliGRAM(s) Oral daily  senna 2 Tablet(s) Oral at bedtime  tamsulosin 0.4 milliGRAM(s) Oral at bedtime      LABS:Creatinine Trend: 0.81<--, 0.65<--, 0.74<--, 0.73<--, 0.86<--, 0.78<--    PHYSICAL EXAM:  T(C): 36.7 (10-14-24 @ 05:15), Max: 36.7 (10-13-24 @ 20:09)  HR: 61 (10-14-24 @ 05:15) (61 - 71)  BP: 114/53 (10-14-24 @ 05:15) (107/58 - 114/53)  RR: 18 (10-14-24 @ 05:15) (17 - 18)  SpO2: 98% (10-14-24 @ 05:15) (96% - 98%)  Wt(kg): --    I&O's Summary      General: NAD  Head: Normocephalic and atraumatic.   Neck: No JVD. No bruits. Supple. Does not appear to be enlarged.   Cardiovascular: + S1,S2 ; RRR Soft systolic murmur at the left lower sternal border. No rubs noted.    Lungs: CTA b/l. No rhonchi, rales or wheezes.   Abdomen: + BS, soft. Non tender. Non distended. No rebound. No guarding.   Extremities: No clubbing/cyanosis/edema.   Neurologic: Moves all four extremities. Full range of motion.   Skin: Warm and moist. The patient's skin has normal elasticity and good skin turgor.   Psychiatric: cannot eval  Musculoskeletal: Normal range of motion, normal strength    DATA    ECG:  	AF 140s    < from: TTE W or WO Ultrasound Enhancing Agent (09.26.24 @ 15:20) >     CONCLUSIONS:      1. Left ventricular systolic function is normal with an ejection fraction of 69 % by Alex's method ofdisks.   2. Enlarged right ventricular cavity size and reduced right ventricular systolic function.   3. Left atrium is normal in size.   4. The aortic valve appears trileaflet with reduced systolic excursion. There is moderate aortic stenosis. The peak transaortic velocity is 2.37 m/s, peak transaortic gradient is 22.5 mmHg and mean transaortic gradient is 11.0 mmHg with an LVOT/aortic valve VTI ratio of 0.39. The aortic valve area is estimated at 1.21 cm² by the continuity equation.   5. Estimated pulmonary artery systolic pressure is 57 mmHg, consistent with moderate pulmonary hypertension.   6. No pericardial effusion seen.  < end of copied text >      ASSESSMENT/PLAN: 	  89M with PMH cognitive dysfunction (AAOx2 at baseline), advanced COPD on daily prednisone with chronic respiratory failure on 4L NC, CAD/MI s/p stent 15 years ago, HLD, and BPH BIBEMS from [facility] for confusion in setting of suspected pneumonia. Cardiology consulted for New onset Rapid Afib /?flutter today    --TTE noted with Normal LVEF, mod AS, mod Pulm HTN  --remains in SR on cardizem CD 120mg daily  --CT head/chest noted--family does not want malignancy w/u,   -- spoke with daughter she states that he is fairly bedbound over last couple months and needs help with ADLs also reports that since his mental status has been waxing and waning  she would prefer to hold of AC for now and its bleeding risk  - EP f/u appreciated    Hyun Cooper MD  Pager: [phone]

## 2024-10-14 NOTE — PROVIDER CONTACT NOTE (OTHER) - ACTION/TREATMENT ORDERED:
KINJAL Meyers notified.
Pt now on hospice consult, no further events on tele
despite repeated attempts, pt refused meds every single attempt, including family
KINJAL Meyers notified. 6AM meds rescheduled.
ACP made aware.
ACP made aware.
Pt remains on tele, Zoll and atropine at bedside
ACP Everette Adhikari notified.  Per ACP, no need to change PO cardizem to IV at this time as pt's HR and rhythm are stable.  Per ACP, continue to monitor on tele at this time.

## 2024-10-14 NOTE — PROGRESS NOTE ADULT - ASSESSMENT
89M with PMH cognitive dysfunction (AAOx2 at baseline), advanced COPD on daily prednisone with chronic respiratory failure on 4L NC, CAD/MI s/p stent, HLD, and BPH BIBEMS from [facility] for confusion in setting of suspected pneumonia. Pt does not know why he was brought to the hospital, unable to provide much history. Collateral obtained from daughter/HCP Meghan Gonzalez. Pt noted to have intermittent episodes of confusion by NH staff over the past 2 days, occasionally become agitated, saying nonsensical things, all unlike him. Pt is AAOx2 at baseline, would not know the year, but otherwise conversational. Whenever he was visited by daughter or son though, he was found to be at his baseline. He had a UA done that was unremarkable and a CXR that showed "pulmonary vascular congestion with patchy bibasilar infriltrates"- copy in pt's chart. And he was started on doxycycline yesterday, unclear how many doses he took. Pt with a chronic cough, did not notice anything like increased cough or sputum production from baseline. No witnessed episodes of shortness of breath, no increase in baseline oxygen requirement. No known fevers. Meghan believes patient has a history of heart failure and takes lasix regularly, however, heart failure not listed as a diagnosis from NH or [facility] outpatient pulm notes, lasix also not listed on medication list from NH or surescripts.   Pt himself currently feels well. Denies having any trouble breathing, chest pain, abdominal pain, or any other complaints.     Acute metabolic encephalopathy.   -check repeat CT head -NOT DONE YET;  WAS CANCELLED   - could be related to infection?  pneumonia,  uti etc:  being rule dout  - his VBG is fine with no retention of co2:  so thats not the reason of him getting more confused;     9/29;  -he is still confused?  baseline not sure;   -he is not wheezing;  awaiting ct chest   -heis still on 5 L:  try to bring down ; sao2 gaol is 88%; ex smoker     9/30: he is still  pretty confused;    -on chr prednisone for copd;   he is not wheezing    10/1; seems to be doing  ok ;on 4 L of oxygen :  10/2: on 4 L of oxygen ; try to minimise o x as tolerated  10/3:  called daughter : she is aware of him having malignancy probably:   -on prednisone  -daughter does not to work up for malignancy  :  10/4:  -he remains OK;  les acitated today  ;  alert and awke:   no resp distress:  no wheezing:  for malignancy as above   10/5  -he seems comfortable:   -he is on 6 L of oxygen : need to decrease:  the goal of o2 saturation is arround 885 in his copd pt: he does not need this much of oxygen    -cont BD   -he is on chr 10 mg of prednisone  -he is delirious too :   10/6:  seems OK:  still on 5 L : despite the goal of saturation is 88%: had dw acp yesterday:  I believe the oxygen can still be lowered to 2 L per minute and further  cont BD   10/8:  -he is on baseline of home oxygen requirement at 4 L per minute:   he is not agitated and is not in any resp distress:  cont current therapy:   10/9: seems comfortable;  on 3 L of oxygen : shei s alert and of confused;   10/10:  -seems OK: on 2-3 L of oxygen :  no wheezing:  no overnight events   10/11;  -on 4 L of oxygen ; she is doing  ok : no sob:  no coguh : no phlegm    -no sob:  no wheezing:  cont current rx:   10/12;  her mentals state remains the same;   no sob:  no cough ; no phlegm    -on 4 L of oxygen :  no wheezing:  no overnight events   10/13:  seems same;  alert and awake and confused:  on 3 L of oxygen   -no resp distress:  no wheezing   seems comfortable pulm wise   10/14:  seems to be doing  ok : no sob;  on 3 L of oxygen ; no resp distress:       Mild leukocytosis to 12k on admission  - infectious vs reactive. afebrile, not septic, hemodynamically stable.   -Without any new fevers,   -URI sxs, worsening of chronic cough, or increased O2 requirements  - Procal negative.  - cw abx as per ID  ; to cont for now;   -needs ct chest     9/29; -on no antibiotics   -afebrile today  ;  wbc is normal today     9/30: remains afebrile and wbc is normal   10/1: seems OK:   10/2: cont current care:   10/4:  leucocytosis resolved;   10/5:l resolved   10/6  seems eryn :  no sob:  on 5 L of oxygen    10/7;  -today on 4 L:  still satting good;  100%  10/8: cont 4 L of oxygen : no sob:  no cough :   10/9; cont current meds  10/10: Last wbc count on 6th was normal   10/11: no fever:    10/163: WBC is normal    10/14:  ; no blood draws in last many days  ; await atuthorization    Advanced COPD.   -ow suspicion for COPD exacerbation, lung exam with localized rhonchi/coarse crackles over R>L base. No increased baseline O2 requirement or worsening of chronic cough  - continue home prednisone 10mg daily   - duonebs PRN.  -and his VBg also did not show any  significant retention ofpco2:     9/30:  -cont current rx:  he seems stable:   -ct chest showed; Extensive emphysematous changes. Bibasilar predominant peripheral reticular opacities, traction bronchiectasis, and subpleural cystic changes, compatible with   interstitial lung disease. Right lung pulmonary nodules, measuring up to 1.8 cm, concerning for neoplasia.  Consider PET scan or 3 month follow up. Small left pleural effusion with basilar pleural thickening. Partially imaged left parotid gland mass. Consider further evaluation with contrast-enhanced MR neck from more definitive characterization.    10/1:  dw daughter : she will decide about the pulm nodule:  and has parotid gland tumor ; work up per primary team     10/3: no workup per daughter   10/4:  pulm wise he seems stable:  no sob:  no wheezing   10/5: need to decrease the oxygen as documented above   10/6:  no wheezing:   no co2 retention  cont BD   10/7;  cont BD:  no wheezing:    10/8:  -remains stable:  pulm wise:   -he likely has malignant nodule:  daughter is aware:  probably would not do any further workup : but can repeat his ct scan chest in 6 weeks time   10/11: cont BD   10/12:  seems OK:  no sob:  no cough ; no phlegm    10/13: cont current care:   10/14: clinically he looks OK;  no sob:  he seems more oriented to me today     Chronic hypoxic respiratory failure.   ·  Plan: - continue home NC at 4L NC, goal 88-92&-likely due to copd   10/2; seems to be doing  ok :   10/03: cont 4 L of NC :  he is not wheezing:  vbg ordered   10/04;-VBG is very good:   10/5: VBG reasonable  no co2 retention:  reduce oxygen    10/6 : try to decrease oxygen down to 1 L  per minute or even off   10/7; cont to wean off oxygen    10/8: he is on baseline of 4 L of oxygen    10/9: cont wean down oxygen as possible  10/10:  she is still on 3-4 L of oxygen    10/12: on 4 L of nasal cannula    10/13: on 3 L of oxygen : can try to decrease in AM   10/14; cont o2;  try to wean : goal of o2 sao2 is 88%    CAD (coronary artery disease).    - continue home statin, plavix.    -NEW aflutter   -echo showed;  1. Left ventricular systolic function is normal with an ejection fraction of 69 % by Alex's method of disks.   2. Enlarged right ventricular cavity size and reduced right ventricular systolic function.   3. Left atrium is normal in size.   4. The aortic valve appears trileaflet with reduced systolic excursion. There is moderate aortic stenosis. The peak transaortic velocity is 2.37 m/s, peak transaortic gradient is 22.5 mmHg and mean transaortic gradient is 11.0 mmHg with an LVOT/aortic valve VTI ratio of 0.39. The aortic valve area is estimated at 1.21 cm² by the continuity equation.   5. Estimated pulmonary artery systolic pressure is 57 mmHg, consistent with moderate pulmonary hypertension.  defer to cards     10/5L converted back to NSR : daughter refused for AC:        yancy acp

## 2024-10-14 NOTE — PROVIDER CONTACT NOTE (OTHER) - DATE AND TIME:
03-Oct-2024 22:21
03-Oct-2024 08:00
03-Oct-2024 22:15
14-Oct-2024 21:54
27-Sep-2024 23:02
03-Oct-2024 13:36
14-Oct-2024 05:16
30-Sep-2024 12:30

## 2024-10-14 NOTE — PROGRESS NOTE ADULT - SUBJECTIVE AND OBJECTIVE BOX
Patient is a 89y old  Male who presents with a chief complaint of acute metabolic encephalopathy (14 Oct 2024 14:29)    Date of servie : 10-14-24 @ 17:17  INTERVAL HPI/OVERNIGHT EVENTS:  T(C): 36.7 (10-14-24 @ 11:20), Max: 36.7 (10-13-24 @ 20:09)  HR: 70 (10-14-24 @ 11:20) (61 - 71)  BP: 115/56 (10-14-24 @ 11:20) (113/53 - 115/56)  RR: 18 (10-14-24 @ 11:20) (18 - 18)  SpO2: 97% (10-14-24 @ 11:20) (97% - 98%)  Wt(kg): --  I&O's Summary      LABS:              CAPILLARY BLOOD GLUCOSE                MEDICATIONS  (STANDING):  atorvastatin 10 milliGRAM(s) Oral at bedtime  buDESOnide    Inhalation Suspension 0.5 milliGRAM(s) Inhalation every 12 hours  clopidogrel Tablet 75 milliGRAM(s) Oral daily  diltiazem    milliGRAM(s) Oral daily  enoxaparin Injectable 40 milliGRAM(s) SubCutaneous every 24 hours  finasteride 5 milliGRAM(s) Oral daily  predniSONE   Tablet 10 milliGRAM(s) Oral daily  senna 2 Tablet(s) Oral at bedtime  tamsulosin 0.4 milliGRAM(s) Oral at bedtime    MEDICATIONS  (PRN):  acetaminophen     Tablet .. 650 milliGRAM(s) Oral every 6 hours PRN Temp greater or equal to 38C (100.4F), Mild Pain (1 - 3)  albuterol/ipratropium for Nebulization 3 milliLiter(s) Nebulizer every 6 hours PRN Shortness of Breath and/or Wheezing  aluminum hydroxide/magnesium hydroxide/simethicone Suspension 30 milliLiter(s) Oral every 4 hours PRN Dyspepsia  melatonin 3 milliGRAM(s) Oral at bedtime PRN Insomnia  ondansetron Injectable 4 milliGRAM(s) IV Push every 8 hours PRN Nausea and/or Vomiting          PHYSICAL EXAM:  GENERAL: NAD, well-groomed, well-developed  HEAD:  Atraumatic, Normocephalic  CHEST/LUNG: Clear to percussion bilaterally; No rales, rhonchi, wheezing, or rubs  HEART: Regular rate and rhythm; No murmurs, rubs, or gallops  ABDOMEN: Soft, Nontender, Nondistended; Bowel sounds present  EXTREMITIES:  2+ Peripheral Pulses, No clubbing, cyanosis, or edema  LYMPH: No lymphadenopathy noted  SKIN: No rashes or lesions    Care Discussed with Consultants/Other Providers [ ] YES  [ ] NO

## 2024-10-14 NOTE — PROVIDER CONTACT NOTE (OTHER) - ASSESSMENT
Patient A&Ox1, agitated and screaming.  Patient disoriented and confused.  Patient's VS are stable and HR is 78 NSR
Pt hostile, attempting to kick, hit staff and family. Pt is convinced everyone is trying to poison him
no changes in mental status. VSS. patient denies chest pain. no s/s of shortness of breath. No use of accessory muscles.
Patient is AOx2. Patient vitals as documented. Patient is asymptomatic. No signs of acute distress.
Patient is AOx2. Patient vitals as documented. Patient refused meds because as per patient, the BP does not require meds to be given.
Pt sleeping
Pt sleeping
no changes in mental status. VSS. patient denies chest pain or sob.

## 2024-10-14 NOTE — PROVIDER CONTACT NOTE (OTHER) - BACKGROUND
89 y.o male for restlessness and agitation. PMHx includes COPD, BPH, CAD.
Pt has waivering confusion and aggression
Pt bradied to 24. 28 in ED
Pt sleeping, bradied down to 43
Patient is admitted for restlessness and agitation. PMH of BPH, HLD, CAD, COPD, chronic cough.
Patient is admitted for restlessness and agitation. PMH of BPH, HLD, CAD, COPD, chronic cough.
(Admit Diagnosis) Restlessness and agitation  (PMH) BPH (benign prostatic hyperplasia)  (PMH) HLD (hyperlipidemia)  (PMH) Chronic cough
89 y.o male for restlessness and agitation. PMHx includes COPD, BPH, CAD.

## 2024-10-14 NOTE — PROGRESS NOTE ADULT - ASSESSMENT
89M with PMH cognitive dysfunction (AAOx2 at baseline), advanced COPD on daily prednisone with chronic respiratory failure on 4L NC, CAD/MI s/p stent, HLD, and BPH BIBEMS from [facility] for confusion x 2 days. Chest imaging suggestive of pulmonary edema vs pneumonia.         Problem/Plan - 1:  ·  Problem: Acute metabolic encephalopathy.   -likely worsening dementia   - frequent reorientation-    Problem/Plan - 2:·  Problem: Leukocytosis.   resolved   ID following       Problem/Plan - 3:·  Problem: Advanced COPD. ·  Plan: low suspicion for COPD exacerbation, lung exam with localized rhonchi/coarse crackles over R>L base. No increased baseline O2 requirement or worsening of chronic cough  - continue home prednisone 10mg daily   - duonebs PRN.  pul following     Problem/Plan - 4:  ·  Problem: lung nodule : could be malignancy  ·  Plan: - CT reviewed  - Family does not want w/u or treatment   - family wants comfort measures only     Problem/Plan - 5:·  Problem: CAD (coronary artery disease).   ·  Plan: - continue home statin, plavix.    Problem/Plan - 6:  ·  Problem: BPH (benign prostatic hyperplasia).   ·  Plan: - continue home finasteride and tamsulosin.    Problem/Plan - 7:·  Problem: new aflutter   ·  Plan: cw tele  started on cardizem echo : nml EF , likely diastolic failuire seen by EP cardiology   HR is controlled   not candidate for any AC : 2/2 agitation and fall risk     dw sw, awaiting LTC

## 2024-10-14 NOTE — PROVIDER CONTACT NOTE (OTHER) - NAME OF MD/NP/PA/DO NOTIFIED:
KINJAL Adhikari
KINJAL Meyers
KINJAL Meyers
Zelda Quach ACP
ACP Nadia Barbosa
Zelda Quach ACP
KINJAL Whitmore

## 2024-10-14 NOTE — PROVIDER CONTACT NOTE (OTHER) - SITUATION
Patient refused morning meds
Pt on tele, Sinus chelita, pt  bradied to 30
Patient very agitated, screaming and trying to get out of restraints.  Patient opening mouth far enough to hold pills and then spit them back at staff
Pt on tele, normally SR
Pt is A&)x1, on 5LNC, aggressive and combative
/46
patient desaturated to 84% on 5L NC.
patients refuses meds.

## 2024-10-14 NOTE — PROVIDER CONTACT NOTE (OTHER) - REASON
patients refuses meds.
Jesús to 30, pauses
Patient agitated, refusing to take oral meds, spitting them at staff
/46
Patient refused morning meds
patient desaturated to 84% on 5L NC.
Pt refusing po meds
Jesús to 42

## 2024-10-14 NOTE — PROGRESS NOTE ADULT - SUBJECTIVE AND OBJECTIVE BOX
EP     HISTORY OF PRESENT ILLNESS: HPI:  89M with PMH cognitive dysfunction (AAOx2 at baseline), advanced COPD on daily prednisone with chronic respiratory failure on 4L NC, CAD/MI s/p stent, HLD, and BPH BIBEMS from [facility] for confusion in setting of suspected pneumonia. Pt does not know why he was brought to the hospital, unable to provide much history. Collateral obtained from daughter/HCP Meghan Gonzalez. Pt noted to have intermittent episodes of confusion by NH staff over the past 2 days, occasionally become agitated, saying nonsensical things, all unlike him. Pt is AAOx2 at baseline, would not know the year, but otherwise conversational. Whenever he was visited by daughter or son though, he was found to be at his baseline. He had a UA done that was unremarkable and a CXR that showed "pulmonary vascular congestion with patchy bibasilar infriltrates"- copy in pt's chart. And he was started on doxycycline yesterday, unclear how many doses he took. Pt with a chronic cough, did not notice anything like increased cough or sputum production from baseline. No witnessed episodes of shortness of breath, no increase in baseline oxygen requirement. No known fevers. Meghan believes patient has a history of heart failure and takes lasix regularly, however, heart failure not listed as a diagnosis from NH or [facility] outpatient pulm notes, lasix also not listed on medication list from NH or surescripts.   Pt himself currently feels well. Denies having any trouble breathing, chest pain, abdominal pain, or any other complaints.      Date of service  10/14- no new complaints.        acetaminophen     Tablet .. 650 milliGRAM(s) Oral every 6 hours PRN  albuterol/ipratropium for Nebulization 3 milliLiter(s) Nebulizer every 6 hours PRN  aluminum hydroxide/magnesium hydroxide/simethicone Suspension 30 milliLiter(s) Oral every 4 hours PRN  atorvastatin 10 milliGRAM(s) Oral at bedtime  buDESOnide    Inhalation Suspension 0.5 milliGRAM(s) Inhalation every 12 hours  clopidogrel Tablet 75 milliGRAM(s) Oral daily  diltiazem    milliGRAM(s) Oral daily  enoxaparin Injectable 40 milliGRAM(s) SubCutaneous every 24 hours  finasteride 5 milliGRAM(s) Oral daily  melatonin 3 milliGRAM(s) Oral at bedtime PRN  ondansetron Injectable 4 milliGRAM(s) IV Push every 8 hours PRN  predniSONE   Tablet 10 milliGRAM(s) Oral daily  senna 2 Tablet(s) Oral at bedtime  tamsulosin 0.4 milliGRAM(s) Oral at bedtime      T(C): 36.7 (10-14-24 @ 11:20), Max: 36.7 (10-13-24 @ 20:09)  HR: 70 (10-14-24 @ 11:20) (61 - 71)  BP: 115/56 (10-14-24 @ 11:20) (113/53 - 115/56)  RR: 18 (10-14-24 @ 11:20) (18 - 18)  SpO2: 97% (10-14-24 @ 11:20) (97% - 98%)  Wt(kg): --    I&O's Summary      Appearance: elderly man, sleepy and confused.	  HEENT:   Normal oral mucosa, PERRL, EOMI	  Lymphatic: No lymphadenopathy , no edema  Cardiovascular: Normal S1 S2, No JVD, No murmurs , Peripheral pulses palpable 2+ bilaterally  Respiratory: Lungs clear to auscultation, normal effort 	  Gastrointestinal:  Soft, Non-tender, + BS	  Skin: No rashes, No ecchymoses, No cyanosis, warm to touch  Musculoskeletal: Normal range of motion, normal strength  Psychiatry:  Mood & affect appropriate    TELEMETRY: AFib/AFlutter	    Echo: < from: TTE W or WO Ultrasound Enhancing Agent (09.26.24 @ 15:20) >     1. Left ventricular systolic function is normal with an ejection fraction of 69 % by Alex's method ofdisks.   2. Enlargd right ventricular cavity size and reduced right ventricular systolic function.   3. Left atrium is normal in size.   4. The aortic valve appears trileaflet with reduced systolic excursion. There is moderate aortic stenosis. The peak transaortic velocity is 2.37 m/s, peak transaortic gradient is 22.5 mmHg and mean transaortic gradient is 11.0 mmHg with an LVOT/aortic valve VTI ratio of 0.39. The aortic valve area is estimated at 1.21 cm² by the continuity equation.   5. Estimated pulmonary artery systolic pressure is 57 mmHg, consistent with moderate pulmonary hypertension.   6. No pericardial effusion seen.    < end of copied text >  	  ASSESSMENT/PLAN: Mr Barajas is a 89y Male here from nursing home w/ shortness of breath.  Has COPD, may have superimposed pneumonia.  New dx of atrial arrhythmia, may be causing acute diastolic heart failure.  Continue AV aniket blockade w/ diltiazem.  Heartrate is adequately controlled now.  Awaiting remainder of workup and goals of care before starting oral anticoagulation for stroke prevention.  Antyl doesnt want anticoag.    Tarik Marin M.D.  Cardiac Electrophysiology  [phone]

## 2024-10-15 ENCOUNTER — TRANSCRIPTION ENCOUNTER (OUTPATIENT)
Age: 89
End: 2024-10-15

## 2024-10-15 RX ORDER — BUDESONIDE, MICRONIZED 100 %
0.5 POWDER (GRAM) MISCELLANEOUS
Qty: 0 | Refills: 0 | DISCHARGE
Start: 2024-10-15

## 2024-10-15 RX ORDER — DILTIAZEM HCL 300 MG
1 CAPSULE, EXTENDED RELEASE 24HR ORAL
Qty: 0 | Refills: 0 | DISCHARGE
Start: 2024-10-15

## 2024-10-15 RX ORDER — 5-HYDROXYTRYPTOPHAN (5-HTP) 100 MG
1 TABLET,DISINTEGRATING ORAL
Qty: 0 | Refills: 0 | DISCHARGE
Start: 2024-10-15

## 2024-10-15 RX ADMIN — Medication 0.4 MILLIGRAM(S): at 20:58

## 2024-10-15 RX ADMIN — FINASTERIDE 5 MILLIGRAM(S): 5 TABLET, FILM COATED ORAL at 09:12

## 2024-10-15 RX ADMIN — Medication 2 TABLET(S): at 20:58

## 2024-10-15 RX ADMIN — ENOXAPARIN SODIUM 40 MILLIGRAM(S): 150 INJECTION SUBCUTANEOUS at 09:12

## 2024-10-15 RX ADMIN — Medication 120 MILLIGRAM(S): at 05:27

## 2024-10-15 RX ADMIN — Medication 75 MILLIGRAM(S): at 09:13

## 2024-10-15 RX ADMIN — Medication 0.5 MILLIGRAM(S): at 21:51

## 2024-10-15 RX ADMIN — ATORVASTATIN CALCIUM 10 MILLIGRAM(S): 10 TABLET, FILM COATED ORAL at 20:58

## 2024-10-15 RX ADMIN — PREDNISONE 10 MILLIGRAM(S): 5 TABLET ORAL at 05:27

## 2024-10-15 RX ADMIN — Medication 0.5 MILLIGRAM(S): at 09:58

## 2024-10-15 NOTE — PROGRESS NOTE ADULT - SUBJECTIVE AND OBJECTIVE BOX
DATE OF SERVICE: 10-15-24     No overnight events    MEDICATIONS:  acetaminophen     Tablet .. 650 milliGRAM(s) Oral every 6 hours PRN  albuterol/ipratropium for Nebulization 3 milliLiter(s) Nebulizer every 6 hours PRN  aluminum hydroxide/magnesium hydroxide/simethicone Suspension 30 milliLiter(s) Oral every 4 hours PRN  atorvastatin 10 milliGRAM(s) Oral at bedtime  buDESOnide    Inhalation Suspension 0.5 milliGRAM(s) Inhalation every 12 hours  clopidogrel Tablet 75 milliGRAM(s) Oral daily  diltiazem    milliGRAM(s) Oral daily  enoxaparin Injectable 40 milliGRAM(s) SubCutaneous every 24 hours  finasteride 5 milliGRAM(s) Oral daily  melatonin 3 milliGRAM(s) Oral at bedtime PRN  ondansetron Injectable 4 milliGRAM(s) IV Push every 8 hours PRN  predniSONE   Tablet 10 milliGRAM(s) Oral daily  senna 2 Tablet(s) Oral at bedtime  tamsulosin 0.4 milliGRAM(s) Oral at bedtime          T(C): 36.4 (10-15-24 @ 05:25), Max: 36.6 (10-14-24 @ 21:30)  HR: 70 (10-15-24 @ 05:25) (70 - 74)  BP: 132/52 (10-15-24 @ 05:25) (107/46 - 132/52)  RR: 18 (10-15-24 @ 05:25) (18 - 18)  SpO2: 95% (10-15-24 @ 05:25) (95% - 97%)  Wt(kg): --    General: NAD  Head: Normocephalic and atraumatic.   Neck: No JVD. No bruits. Supple. Does not appear to be enlarged.   Cardiovascular: + S1,S2 ; RRR Soft systolic murmur at the left lower sternal border. No rubs noted.    Lungs: CTA b/l. No rhonchi, rales or wheezes.   Abdomen: + BS, soft. Non tender. Non distended. No rebound. No guarding.   Extremities: No clubbing/cyanosis/edema.   Neurologic: Moves all four extremities. Full range of motion.   Skin: Warm and moist. The patient's skin has normal elasticity and good skin turgor.   Psychiatric: cannot eval  Musculoskeletal: Normal range of motion, normal strength    DATA    ECG:  	AF 140s    < from: TTE W or WO Ultrasound Enhancing Agent (09.26.24 @ 15:20) >     CONCLUSIONS:      1. Left ventricular systolic function is normal with an ejection fraction of 69 % by Alex's method ofdisks.   2. Enlarged right ventricular cavity size and reduced right ventricular systolic function.   3. Left atrium is normal in size.   4. The aortic valve appears trileaflet with reduced systolic excursion. There is moderate aortic stenosis. The peak transaortic velocity is 2.37 m/s, peak transaortic gradient is 22.5 mmHg and mean transaortic gradient is 11.0 mmHg with an LVOT/aortic valve VTI ratio of 0.39. The aortic valve area is estimated at 1.21 cm² by the continuity equation.   5. Estimated pulmonary artery systolic pressure is 57 mmHg, consistent with moderate pulmonary hypertension.   6. No pericardial effusion seen.  < end of copied text >      ASSESSMENT/PLAN: 	  89M with PMH cognitive dysfunction (AAOx2 at baseline), advanced COPD on daily prednisone with chronic respiratory failure on 4L NC, CAD/MI s/p stent 15 years ago, HLD, and BPH BIBEMS from [facility] for confusion in setting of suspected pneumonia. Cardiology consulted for New onset Rapid Afib /?flutter today    --TTE noted with Normal LVEF, mod AS, mod Pulm HTN  --remains in SR on cardizem CD 120mg daily  --CT head/chest noted--family does not want malignancy w/u,   --spoke with daughter she states that he is fairly bedbound over last couple months and needs help with ADLs also reports that since his mental status has been waxing and waning  she would prefer to hold of AC for now and its bleeding risk  - EP f/u appreciated  -DC planning to [facility]  [phone]

## 2024-10-15 NOTE — PROGRESS NOTE ADULT - ASSESSMENT
89M with PMH cognitive dysfunction (AAOx2 at baseline), advanced COPD on daily prednisone with chronic respiratory failure on 4L NC, CAD/MI s/p stent, HLD, and BPH BIBEMS from [facility] for confusion x 2 days. Chest imaging suggestive of pulmonary edema vs pneumonia.         Problem/Plan - 1:  ·  Problem: Acute metabolic encephalopathy.   -likely worsening dementia   - frequent reorientation-    Problem/Plan - 2:·  Problem: Leukocytosis.   resolved   ID following       Problem/Plan - 3:·  Problem: Advanced COPD. ·  Plan: low suspicion for COPD exacerbation, lung exam with localized rhonchi/coarse crackles over R>L base. No increased baseline O2 requirement or worsening of chronic cough  - continue home prednisone 10mg daily   - duonebs PRN.  pul following     Problem/Plan - 4:  ·  Problem: lung nodule : could be malignancy  ·  Plan: - CT reviewed  - Family does not want w/u or treatment   - family wants comfort measures only     Problem/Plan - 5:·  Problem: CAD (coronary artery disease).   ·  Plan: - continue home statin, plavix.    Problem/Plan - 6:  ·  Problem: BPH (benign prostatic hyperplasia).   ·  Plan: - continue home finasteride and tamsulosin.    Problem/Plan - 7:·  Problem: new aflutter   ·  Plan: cw tele  started on cardizem echo : nml EF , likely diastolic failuire seen by EP cardiology   HR is controlled   not candidate for any AC : 2/2 agitation and fall risk     dw sw, awaiting LTC

## 2024-10-15 NOTE — PROGRESS NOTE ADULT - ASSESSMENT
89M with PMH cognitive dysfunction (AAOx2 at baseline), advanced COPD on daily prednisone with chronic respiratory failure on 4L NC, CAD/MI s/p stent, HLD, and BPH BIBEMS from [facility] for confusion in setting of suspected pneumonia. Pt does not know why he was brought to the hospital, unable to provide much history. Collateral obtained from daughter/HCP Meghan Gonzalez. Pt noted to have intermittent episodes of confusion by NH staff over the past 2 days, occasionally become agitated, saying nonsensical things, all unlike him. Pt is AAOx2 at baseline, would not know the year, but otherwise conversational. Whenever he was visited by daughter or son though, he was found to be at his baseline. He had a UA done that was unremarkable and a CXR that showed "pulmonary vascular congestion with patchy bibasilar infriltrates"- copy in pt's chart. And he was started on doxycycline yesterday, unclear how many doses he took. Pt with a chronic cough, did not notice anything like increased cough or sputum production from baseline. No witnessed episodes of shortness of breath, no increase in baseline oxygen requirement. No known fevers. Meghan believes patient has a history of heart failure and takes lasix regularly, however, heart failure not listed as a diagnosis from NH or [facility] outpatient pulm notes, lasix also not listed on medication list from NH or surescripts.   Pt himself currently feels well. Denies having any trouble breathing, chest pain, abdominal pain, or any other complaints.     Acute metabolic encephalopathy.   -check repeat CT head -NOT DONE YET;  WAS CANCELLED   - could be related to infection?  pneumonia,  uti etc:  being rule dout  - his VBG is fine with no retention of co2:  so thats not the reason of him getting more confused;     9/29;  -he is still confused?  baseline not sure;   -he is not wheezing;  awaiting ct chest   -heis still on 5 L:  try to bring down ; sao2 gaol is 88%; ex smoker     9/30: he is still  pretty confused;    -on chr prednisone for copd;   he is not wheezing    10/1; seems to be doing  ok ;on 4 L of oxygen :  10/2: on 4 L of oxygen ; try to minimise o x as tolerated  10/3:  called daughter : she is aware of him having malignancy probably:   -on prednisone  -daughter does not to work up for malignancy  :  10/4:  -he remains OK;  les acitated today  ;  alert and awke:   no resp distress:  no wheezing:  for malignancy as above   10/5  -he seems comfortable:   -he is on 6 L of oxygen : need to decrease:  the goal of o2 saturation is arround 885 in his copd pt: he does not need this much of oxygen    -cont BD   -he is on chr 10 mg of prednisone  -he is delirious too :   10/6:  seems OK:  still on 5 L : despite the goal of saturation is 88%: had dw acp yesterday:  I believe the oxygen can still be lowered to 2 L per minute and further  cont BD   10/8:  -he is on baseline of home oxygen requirement at 4 L per minute:   he is not agitated and is not in any resp distress:  cont current therapy:   10/9: seems comfortable;  on 3 L of oxygen : shei s alert and of confused;   10/10:  -seems OK: on 2-3 L of oxygen :  no wheezing:  no overnight events   10/11;  -on 4 L of oxygen ; she is doing  ok : no sob:  no coguh : no phlegm    -no sob:  no wheezing:  cont current rx:   10/12;  her mentals state remains the same;   no sob:  no cough ; no phlegm    -on 4 L of oxygen :  no wheezing:  no overnight events   10/13:  seems same;  alert and awake and confused:  on 3 L of oxygen   -no resp distress:  no wheezing   seems comfortable pulm wise   10/14:  seems to be doing  ok : no sob;  on 3 L of oxygen ; no resp distress:       Mild leukocytosis to 12k on admission  - infectious vs reactive. afebrile, not septic, hemodynamically stable.   -Without any new fevers,   -URI sxs, worsening of chronic cough, or increased O2 requirements  - Procal negative.  - cw abx as per ID  ; to cont for now;   -needs ct chest     9/29; -on no antibiotics   -afebrile today  ;  wbc is normal today     9/30: remains afebrile and wbc is normal   10/1: seems OK:   10/2: cont current care:   10/4:  leucocytosis resolved;   10/5:l resolved   10/6  seems eryn :  no sob:  on 5 L of oxygen    10/7;  -today on 4 L:  still satting good;  100%  10/8: cont 4 L of oxygen : no sob:  no cough :   10/9; cont current meds  10/10: Last wbc count on 6th was normal   10/11: no fever:    10/163: WBC is normal    10/14:  ; no blood draws in last many days  ; await authorization  10/15: encephalopathy almost resolved:  seems back to kali:     Advanced COPD.   -ow suspicion for COPD exacerbation, lung exam with localized rhonchi/coarse crackles over R>L base. No increased baseline O2 requirement or worsening of chronic cough  - continue home prednisone 10mg daily   - duonebs PRN.  -and his VBg also did not show any  significant retention ofpco2:     9/30:  -cont current rx:  he seems stable:   -ct chest showed; Extensive emphysematous changes. Bibasilar predominant peripheral reticular opacities, traction bronchiectasis, and subpleural cystic changes, compatible with   interstitial lung disease. Right lung pulmonary nodules, measuring up to 1.8 cm, concerning for neoplasia.  Consider PET scan or 3 month follow up. Small left pleural effusion with basilar pleural thickening. Partially imaged left parotid gland mass. Consider further evaluation with contrast-enhanced MR neck from more definitive characterization.    10/1:  dw daughter : she will decide about the pulm nodule:  and has parotid gland tumor ; work up per primary team     10/3: no workup per daughter   10/4:  pulm wise he seems stable:  no sob:  no wheezing   10/5: need to decrease the oxygen as documented above   10/6:  no wheezing:   no co2 retention  cont BD   10/7;  cont BD:  no wheezing:    10/8:  -remains stable:  pulm wise:   -he likely has malignant nodule:  daughter is aware:  probably would not do any further workup : but can repeat his ct scan chest in 6 weeks time   10/11: cont BD   10/12:  seems OK:  no sob:  no cough ; no phlegm    10/13: cont current care:   10/14: clinically he looks OK;  no sob:  he seems more oriented to me today   10/15: he has copd as well as ILD:  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   ·  Plan: - continue home NC at 4L NC, goal 88-92&-likely due to copd   10/2; seems to be doing  ok :   10/03: cont 4 L of NC :  he is not wheezing:  vbg ordered   10/04;-VBG is very good:   10/5: VBG reasonable  no co2 retention:  reduce oxygen    10/6 : try to decrease oxygen down to 1 L  per minute or even off   10/7; cont to wean off oxygen    10/8: he is on baseline of 4 L of oxygen    10/9: cont wean down oxygen as possible  10/10:  she is still on 3-4 L of oxygen    10/12: on 4 L of nasal cannula    10/13: on 3 L of oxygen : can try to decrease in AM   10/14; cont o2;  try to wean : goal of o2 sao2 is 88%  10/15: on 3 L of oxygen : seems OK;     CAD (coronary artery disease).    - continue home statin, plavix.    -NEW aflutter   -echo showed;  1. Left ventricular systolic function is normal with an ejection fraction of 69 % by Alex's method of disks.   2. Enlarged right ventricular cavity size and reduced right ventricular systolic function.   3. Left atrium is normal in size.   4. The aortic valve appears trileaflet with reduced systolic excursion. There is moderate aortic stenosis. The peak transaortic velocity is 2.37 m/s, peak transaortic gradient is 22.5 mmHg and mean transaortic gradient is 11.0 mmHg with an LVOT/aortic valve VTI ratio of 0.39. The aortic valve area is estimated at 1.21 cm² by the continuity equation.   5. Estimated pulmonary artery systolic pressure is 57 mmHg, consistent with moderate pulmonary hypertension.  defer to cards     10/5L converted back to NSR : daughter refused for AC:        yancy acp

## 2024-10-15 NOTE — DISCHARGE NOTE PROVIDER - NSDCCPCAREPLAN_GEN_ALL_CORE_FT
PRINCIPAL DISCHARGE DIAGNOSIS  Diagnosis: Agitation  Assessment and Plan of Treatment:       SECONDARY DISCHARGE DIAGNOSES  Diagnosis: CAD (coronary artery disease)  Assessment and Plan of Treatment:     Diagnosis: BPH (benign prostatic hyperplasia)  Assessment and Plan of Treatment:      PRINCIPAL DISCHARGE DIAGNOSIS  Diagnosis: Agitation  Assessment and Plan of Treatment: You came in with confusion and agitation and this is likely due to worsening dementia as your workup was negative.  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  ENT advised further imaging with neck CT or MRI can be done as outpatient and tissue sampling under ultrasound guidance. You did not want to pursue workup in the hospital. Please follow up with your primary care physician regarding your hospitalization and for further monitoring/management.      SECONDARY DISCHARGE DIAGNOSES  Diagnosis: Leukocytosis  Assessment and Plan of Treatment: You had a slightly elevated white count on admission. Your chest XR with bibasilar reticular opacities likely reflecting interstitial lung disease. Your flu/COVID/RSV was negative, urine negative, blood culture negative. Your CT chest showed Right lung pulmonary nodules, measuring up to 1.8 cm, concerning for neoplasia. Repeat CT in 6 weeks. You wer bj antibiotics briefly for concern for pneumonia but seen by Infectious Disease and stopped antibiotics.  Your white count is now normal.    Diagnosis: CAD (coronary artery disease)  Assessment and Plan of Treatment: Please continue your statin medication to control cholesterol levels as well as Plavix as prescribed in order to optimize your heart health. Follow-up with your primary provider/cardiologist as outpatient for ongoing care. If you have persistent chest pain, shortness of breath, heart palpitations, or dizziness you should return to the emergency room.    Diagnosis: BPH (benign prostatic hyperplasia)  Assessment and Plan of Treatment: Continue home medications finasteride and tamsulosin    Diagnosis: Atrial fibrillation and flutter  Assessment and Plan of Treatment: You were found to have an arrhythmia and are on Cardizem for it. You are to be off anticoagulation due to bleeding risk.

## 2024-10-15 NOTE — DISCHARGE NOTE PROVIDER - HOSPITAL COURSE
89M PMH of cognitive dysfunction (A&Ox2 at baseline), advanced COPD on daily prednisone with chronic respiratory failure on 4L NC, CAD/MI s/p stent, HLD, and BPH BIBEMS from [facility] for confusion in setting of suspected pneumonia.    Acute metabolic encephalopathy  - A&Ox2 (name, location) at baseline p/w intermittent episodes of confusion, likely delirium i/s/o infection vs. pulmonary edema; A&Ox2 on admission   - without any focal deficits to suggest structural etiology  - treatment for infection vs. edema as below   - likely worsening dementia, c/w frequent reorientation   - maintain normal sleep/wake cycles (avoid waking patient between 11PM and 6AM unless medically necessary)  - 9/29 CT Chest: Extensive emphysematous changes.Compatible with interstitial lung disease. Right lung pulmonary nodules, measuring up to 1.8 cm, concerning for neoplasia Small left pleural effusion with basilar pleural thickening. Partially imaged left parotid gland mass.   - CT Head: Incidental and partially imaged left parotid gland mass (2.4 cm) and additional smaller mass or lymph node.  ENT referral is advised further imaging with neck CT or MRI can be done as outpatient and tissue sampling under ultrasound guidance.   - NO PLAN FOR MALIGNANCY W/U     Aflutter/Afib  - new aflutter on tele 9/27 --> afib after IV metoprolol x1  - holding off on AC pending cards input per Dr. Cooper as patient is a fall risk  - EP consulted, Rec. Lifelong AC  - cards consulted: c/w cardizem, spoke with daughter who would prefer to hold of AC for now and its bleeding risk    Leukocytosis  - mild leukocytosis to 12k on admission  - CXR with bibasilar reticular opacities likely reflecting interstitial lung disease  - flu/COVID/RSV neg, UA negative; BCx negative  - CT chest (c/f 11cm lung mass seen at OSH on prior admission)-- Right lung pulmonary nodules, measuring up to 1.8 cm, concerning for neoplasia. Rpt CT in 6weeks.  - ID consulted: lower suspicion for PNA; s/p AZT & CTX  - Cleared by pulms for d/c     Advanced COPD  - low suspicion for COPD exacerbation, lung exam with localized rhonchi/coarse crackles over R>L base; no increased baseline O2 requirement or worsening of chronic cough  - c/w prednisone; c/w duonebs PRN    Chronic hypoxic respiratory failure  - TTE w/ EF 69%, reduced RV systolic function, moderate AS, moderate pulmonary HTN  - on 4L NC at home   - goal 88-92%  - c/w supplemental O2 PRN    CAD  - c/w statin, plavix    BPH  - c/w finasteride, tamsulosin    On 10/15/2024, discussed with Dr. Cooper, patient is medically cleared and optimized for discharge today to nursing home. All medications were reviewed with attending.   89M PMH of cognitive dysfunction (A&Ox2 at baseline), advanced COPD on daily prednisone with chronic respiratory failure on 4L NC, CAD/MI s/p stent, HLD, and BPH BIBEMS from [facility] for confusion in setting of suspected pneumonia.    Acute metabolic encephalopathy  - A&Ox2 (name, location) at baseline p/w intermittent episodes of confusion, likely delirium i/s/o infection vs. pulmonary edema; A&Ox2 on admission   - without any focal deficits to suggest structural etiology  - treatment for infection vs. edema as below   - likely worsening dementia, c/w frequent reorientation   - maintain normal sleep/wake cycles (avoid waking patient between 11PM and 6AM unless medically necessary)  - 9/29 CT Chest: Extensive emphysematous changes.Compatible with interstitial lung disease. Right lung pulmonary nodules, measuring up to 1.8 cm, concerning for neoplasia Small left pleural effusion with basilar pleural thickening. Partially imaged left parotid gland mass.   - CT Head: Incidental and partially imaged left parotid gland mass (2.4 cm) and additional smaller mass or lymph node.  ENT referral is advised further imaging with neck CT or MRI can be done as outpatient and tissue sampling under ultrasound guidance.   - NO PLAN FOR MALIGNANCY W/U     Aflutter/Afib  - new aflutter on tele 9/27 --> afib after IV metoprolol x1  - holding off on AC pending cards input per Dr. Cooper as patient is a fall risk  - EP consulted, Rec. Lifelong AC  - cards consulted: c/w cardizem, spoke with daughter who would prefer to hold of AC for now and its bleeding risk    Leukocytosis  - mild leukocytosis to 12k on admission  - CXR with bibasilar reticular opacities likely reflecting interstitial lung disease  - flu/COVID/RSV neg, UA negative; BCx negative  - CT chest (c/f 11cm lung mass seen at OSH on prior admission)-- Right lung pulmonary nodules, measuring up to 1.8 cm, concerning for neoplasia. Rpt CT in 6weeks.  - ID consulted: lower suspicion for PNA; s/p AZT & CTX  - Cleared by pulms for d/c     Advanced COPD  - low suspicion for COPD exacerbation, lung exam with localized rhonchi/coarse crackles over R>L base; no increased baseline O2 requirement or worsening of chronic cough  - c/w prednisone; c/w duonebs PRN    Chronic hypoxic respiratory failure  - TTE w/ EF 69%, reduced RV systolic function, moderate AS, moderate pulmonary HTN  - on 4L NC at home   - goal 88-92%  - c/w supplemental O2 PRN    CAD  - c/w statin, plavix    BPH  - c/w finasteride, tamsulosin    On 10/16/2024, discussed with Dr. Cooper, patient is medically cleared and optimized for discharge today to nursing home. All medications were reviewed with attending.

## 2024-10-15 NOTE — PROGRESS NOTE ADULT - SUBJECTIVE AND OBJECTIVE BOX
EP     HISTORY OF PRESENT ILLNESS: HPI:  89M with PMH cognitive dysfunction (AAOx2 at baseline), advanced COPD on daily prednisone with chronic respiratory failure on 4L NC, CAD/MI s/p stent, HLD, and BPH BIBEMS from [facility] for confusion in setting of suspected pneumonia. Pt does not know why he was brought to the hospital, unable to provide much history. Collateral obtained from daughter/HCP Meghan Gonzalez. Pt noted to have intermittent episodes of confusion by NH staff over the past 2 days, occasionally become agitated, saying nonsensical things, all unlike him. Pt is AAOx2 at baseline, would not know the year, but otherwise conversational. Whenever he was visited by daughter or son though, he was found to be at his baseline. He had a UA done that was unremarkable and a CXR that showed "pulmonary vascular congestion with patchy bibasilar infriltrates"- copy in pt's chart. And he was started on doxycycline yesterday, unclear how many doses he took. Pt with a chronic cough, did not notice anything like increased cough or sputum production from baseline. No witnessed episodes of shortness of breath, no increase in baseline oxygen requirement. No known fevers. Meghan believes patient has a history of heart failure and takes lasix regularly, however, heart failure not listed as a diagnosis from NH or [facility] outpatient pulm notes, lasix also not listed on medication list from NH or surescripts.   Pt himself currently feels well. Denies having any trouble breathing, chest pain, abdominal pain, or any other complaints.      Date of service  10/15- no new complaints.        acetaminophen     Tablet .. 650 milliGRAM(s) Oral every 6 hours PRN  albuterol/ipratropium for Nebulization 3 milliLiter(s) Nebulizer every 6 hours PRN  aluminum hydroxide/magnesium hydroxide/simethicone Suspension 30 milliLiter(s) Oral every 4 hours PRN  atorvastatin 10 milliGRAM(s) Oral at bedtime  buDESOnide    Inhalation Suspension 0.5 milliGRAM(s) Inhalation every 12 hours  clopidogrel Tablet 75 milliGRAM(s) Oral daily  diltiazem    milliGRAM(s) Oral daily  enoxaparin Injectable 40 milliGRAM(s) SubCutaneous every 24 hours  finasteride 5 milliGRAM(s) Oral daily  melatonin 3 milliGRAM(s) Oral at bedtime PRN  ondansetron Injectable 4 milliGRAM(s) IV Push every 8 hours PRN  predniSONE   Tablet 10 milliGRAM(s) Oral daily  senna 2 Tablet(s) Oral at bedtime  tamsulosin 0.4 milliGRAM(s) Oral at bedtime        T(C): 36.3 (10-15-24 @ 11:47), Max: 36.6 (10-14-24 @ 21:30)  HR: 74 (10-15-24 @ 11:47) (68 - 74)  BP: 109/52 (10-15-24 @ 11:47) (107/46 - 132/52)  RR: 18 (10-15-24 @ 11:47) (18 - 18)  SpO2: 94% (10-15-24 @ 11:47) (94% - 97%)  Wt(kg): --    I&O's Summary      Appearance: elderly man, sleepy and confused.	  HEENT:   Normal oral mucosa, PERRL, EOMI	  Lymphatic: No lymphadenopathy , no edema  Cardiovascular: Normal S1 S2, No JVD, No murmurs , Peripheral pulses palpable 2+ bilaterally  Respiratory: Lungs clear to auscultation, normal effort 	  Gastrointestinal:  Soft, Non-tender, + BS	  Skin: No rashes, No ecchymoses, No cyanosis, warm to touch  Musculoskeletal: Normal range of motion, normal strength  Psychiatry:  Mood & affect appropriate    TELEMETRY: AFib/AFlutter	    Echo: < from: TTE W or WO Ultrasound Enhancing Agent (09.26.24 @ 15:20) >     1. Left ventricular systolic function is normal with an ejection fraction of 69 % by Alex's method ofdisks.   2. Enlargd right ventricular cavity size and reduced right ventricular systolic function.   3. Left atrium is normal in size.   4. The aortic valve appears trileaflet with reduced systolic excursion. There is moderate aortic stenosis. The peak transaortic velocity is 2.37 m/s, peak transaortic gradient is 22.5 mmHg and mean transaortic gradient is 11.0 mmHg with an LVOT/aortic valve VTI ratio of 0.39. The aortic valve area is estimated at 1.21 cm² by the continuity equation.   5. Estimated pulmonary artery systolic pressure is 57 mmHg, consistent with moderate pulmonary hypertension.   6. No pericardial effusion seen.    < end of copied text >  	  ASSESSMENT/PLAN: Mr Barajas is a 89y Male here from nursing home w/ shortness of breath.  Has COPD, may have superimposed pneumonia.  New dx of atrial arrhythmia, may be causing acute diastolic heart failure.  Continue AV aniket blockade w/ diltiazem.  Heartrate is adequately controlled now.  Awaiting remainder of workup and goals of care before starting oral anticoagulation for stroke prevention.  Antyl doesnt want anticoag.    Tarik Marin M.D.  Cardiac Electrophysiology  [phone]

## 2024-10-15 NOTE — PROGRESS NOTE ADULT - SUBJECTIVE AND OBJECTIVE BOX
Date of Service: 10-15-24 @ 10:56    Patient is a 89y old  Male who presents with a chief complaint of acute metabolic encephalopathy (15 Oct 2024 09:24)      Any change in ROS: seems pretty good:  alert and awake: on 3 L of oxygen      MEDICATIONS  (STANDING):  atorvastatin 10 milliGRAM(s) Oral at bedtime  buDESOnide    Inhalation Suspension 0.5 milliGRAM(s) Inhalation every 12 hours  clopidogrel Tablet 75 milliGRAM(s) Oral daily  diltiazem    milliGRAM(s) Oral daily  enoxaparin Injectable 40 milliGRAM(s) SubCutaneous every 24 hours  finasteride 5 milliGRAM(s) Oral daily  predniSONE   Tablet 10 milliGRAM(s) Oral daily  senna 2 Tablet(s) Oral at bedtime  tamsulosin 0.4 milliGRAM(s) Oral at bedtime    MEDICATIONS  (PRN):  acetaminophen     Tablet .. 650 milliGRAM(s) Oral every 6 hours PRN Temp greater or equal to 38C (100.4F), Mild Pain (1 - 3)  albuterol/ipratropium for Nebulization 3 milliLiter(s) Nebulizer every 6 hours PRN Shortness of Breath and/or Wheezing  aluminum hydroxide/magnesium hydroxide/simethicone Suspension 30 milliLiter(s) Oral every 4 hours PRN Dyspepsia  melatonin 3 milliGRAM(s) Oral at bedtime PRN Insomnia  ondansetron Injectable 4 milliGRAM(s) IV Push every 8 hours PRN Nausea and/or Vomiting    Vital Signs Last 24 Hrs  T(C): 36.4 (15 Oct 2024 05:25), Max: 36.7 (14 Oct 2024 11:20)  T(F): 97.6 (15 Oct 2024 05:25), Max: 98.1 (14 Oct 2024 11:20)  HR: 70 (15 Oct 2024 05:25) (70 - 74)  BP: 132/52 (15 Oct 2024 05:25) (107/46 - 132/52)  BP(mean): --  RR: 18 (15 Oct 2024 05:25) (18 - 18)  SpO2: 95% (15 Oct 2024 05:25) (95% - 97%)    Parameters below as of 15 Oct 2024 05:25  Patient On (Oxygen Delivery Method): nasal cannula  O2 Flow (L/min): 3      I&O's Summary        Physical Exam:   GENERAL: NAD, well-groomed, well-developed  HEENT: SUGEY/   Atraumatic, Normocephalic  ENMT: No tonsillar erythema, exudates, or enlargement; Moist mucous membranes, Good dentition, No lesions  NECK: Supple, No JVD, Normal thyroid  CHEST/LUNG: Clear to auscultaion  CVS: Regular rate and rhythm; No murmurs, rubs, or gallops  GI: : Soft, Nontender, Nondistended; Bowel sounds present  NERVOUS SYSTEM:  Alert & Oriented X1  EXTREMITIES: -edema  LYMPH: No lymphadenopathy noted  SKIN: No rashes or lesions  ENDOCRINOLOGY: No Thyromegaly  PSYCH: Appropriate    Labs:                CAPILLARY BLOOD GLUCOSE                      RECENT CULTURES:        RESPIRATORY CULTURES:      rad< from: CT Head No Cont (09.29.24 @ 13:10) >  VISUALIZED SINUSES:  Clear.  TYMPANOMASTOID CAVITIES:  Clear.  VISUALIZED ORBITS: Bilateral lens replacement.  CALVARIUM: Intact.    MISCELLANEOUS: Incidental and partially imaged 1.5 x 2.4 cm soft tissue   attenuating mass in the left parotid gland at superior retrocondylar   location, and more peripheral 1.1 cm nodule that may be another tumor or   lymph node.      IMPRESSION:    No acute intracranial findings.    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 ENT referral is advised, and   further imaging with neck CT or MRI can be done as outpatient and tissue   sampling under ultrasound guidance.    --- End of Report ---          EMMA GUERRERO MD; Resident Radiologist  This document has been electronically signed.  OPAL RAMOS MD; Attending Radiologist  This document has been electronically signed. Sep 29 2024  1:25PM    < end of copied text >  < from: CT Chest No Cont (09.29.24 @ 13:10) >    VESSELS: Aortic calcifications. Coronary artery calcifications.    HEART: Heart size is normal. No pericardial effusion.    VISUALIZED UPPER ABDOMEN: A 6.0 cm hepatic dome cyst. Cholecystectomy.    CHEST WALL AND BONES: Degenerative changes of the thoracic spine. A 9 mm   right thyroid lobe hypoattenuating nodule (2-39). 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    IMPRESSION:    Extensive emphysematous changes.    Bibasilar predominant peripheral reticular opacities, traction   bronchiectasis, and subpleural cystic changes, compatible with   interstitial lung disease.    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  Consider PET scan or 3 month follow up.    Small left pleural effusion with basilar pleural thickening.    Partially imaged left parotid gland mass. Consider further evaluation   with contrast-enhanced MR neck from more definitive characterization.    --- End of Report ---      < end of copied text >      Studies  Chest X-RAY  CT SCAN Chest   Venous Dopplers: LE:   CT Abdomen  Others

## 2024-10-15 NOTE — PROGRESS NOTE ADULT - SUBJECTIVE AND OBJECTIVE BOX
Patient is a 89y old  Male who presents with a chief complaint of acute metabolic encephalopathy (15 Oct 2024 13:52)    Date of servie : 10-15-24 @ 14:53  INTERVAL HPI/OVERNIGHT EVENTS:  T(C): 36.3 (10-15-24 @ 11:47), Max: 36.6 (10-14-24 @ 21:30)  HR: 74 (10-15-24 @ 11:47) (68 - 74)  BP: 109/52 (10-15-24 @ 11:47) (107/46 - 132/52)  RR: 18 (10-15-24 @ 11:47) (18 - 18)  SpO2: 94% (10-15-24 @ 11:47) (94% - 97%)  Wt(kg): --  I&O's Summary      LABS:              CAPILLARY BLOOD GLUCOSE                MEDICATIONS  (STANDING):  atorvastatin 10 milliGRAM(s) Oral at bedtime  buDESOnide    Inhalation Suspension 0.5 milliGRAM(s) Inhalation every 12 hours  clopidogrel Tablet 75 milliGRAM(s) Oral daily  diltiazem    milliGRAM(s) Oral daily  enoxaparin Injectable 40 milliGRAM(s) SubCutaneous every 24 hours  finasteride 5 milliGRAM(s) Oral daily  predniSONE   Tablet 10 milliGRAM(s) Oral daily  senna 2 Tablet(s) Oral at bedtime  tamsulosin 0.4 milliGRAM(s) Oral at bedtime    MEDICATIONS  (PRN):  acetaminophen     Tablet .. 650 milliGRAM(s) Oral every 6 hours PRN Temp greater or equal to 38C (100.4F), Mild Pain (1 - 3)  albuterol/ipratropium for Nebulization 3 milliLiter(s) Nebulizer every 6 hours PRN Shortness of Breath and/or Wheezing  aluminum hydroxide/magnesium hydroxide/simethicone Suspension 30 milliLiter(s) Oral every 4 hours PRN Dyspepsia  melatonin 3 milliGRAM(s) Oral at bedtime PRN Insomnia  ondansetron Injectable 4 milliGRAM(s) IV Push every 8 hours PRN Nausea and/or Vomiting          PHYSICAL EXAM:  GENERAL: NAD, well-groomed, well-developed  HEAD:  Atraumatic, Normocephalic  CHEST/LUNG: Clear to percussion bilaterally; No rales, rhonchi, wheezing, or rubs  HEART: Regular rate and rhythm; No murmurs, rubs, or gallops  ABDOMEN: Soft, Nontender, Nondistended; Bowel sounds present  EXTREMITIES:  2+ Peripheral Pulses, No clubbing, cyanosis, or edema  LYMPH: No lymphadenopathy noted  SKIN: No rashes or lesions    Care Discussed with Consultants/Other Providers [ ] YES  [ ] NO

## 2024-10-15 NOTE — DISCHARGE NOTE PROVIDER - NSDCMRMEDTOKEN_GEN_ALL_CORE_FT
cholecalciferol 25 mcg (1000 intl units) oral tablet: 1 tab(s) orally once a day  clopidogrel 75 mg oral tablet: 1 tab(s) orally once a day  docusate sodium 100 mg oral capsule: 2 cap(s) orally once a day (at bedtime)  finasteride 5 mg oral tablet: 1 tab(s) orally once a day  Flomax 0.4 mg oral capsule: 1 cap(s) orally once a day (at bedtime)  ipratropium 500 mcg/2.5 mL inhalation solution: 2.5 milliliter(s) by nebulizer every 6 hours as needed for  shortness of breath and/or wheezing  Multiple Vitamins oral tablet: 1 tab(s) orally once a day  predniSONE 10 mg oral tablet: 1 tab(s) orally once a day  simvastatin 10 mg oral tablet: 1 tab(s) orally once a day (at bedtime)   budesonide: 0.5 milligram(s) inhaled 2 times a day  cholecalciferol 25 mcg (1000 intl units) oral tablet: 1 tab(s) orally once a day  clopidogrel 75 mg oral tablet: 1 tab(s) orally once a day  dilTIAZem 120 mg/24 hours oral capsule, extended release: 1 cap(s) orally once a day  docusate sodium 100 mg oral capsule: 2 cap(s) orally once a day (at bedtime)  finasteride 5 mg oral tablet: 1 tab(s) orally once a day  Flomax 0.4 mg oral capsule: 1 cap(s) orally once a day (at bedtime)  ipratropium 500 mcg/2.5 mL inhalation solution: 2.5 milliliter(s) by nebulizer every 6 hours as needed for  shortness of breath and/or wheezing  melatonin 3 mg oral tablet: 1 tab(s) orally once a day (at bedtime) As needed Insomnia  Multiple Vitamins oral tablet: 1 tab(s) orally once a day  predniSONE 10 mg oral tablet: 1 tab(s) orally once a day  simvastatin 10 mg oral tablet: 1 tab(s) orally once a day (at bedtime)

## 2024-10-16 VITALS
TEMPERATURE: 98 F | SYSTOLIC BLOOD PRESSURE: 112 MMHG | HEART RATE: 68 BPM | RESPIRATION RATE: 18 BRPM | DIASTOLIC BLOOD PRESSURE: 55 MMHG | OXYGEN SATURATION: 98 %

## 2024-10-16 RX ADMIN — ENOXAPARIN SODIUM 40 MILLIGRAM(S): 150 INJECTION SUBCUTANEOUS at 12:53

## 2024-10-16 RX ADMIN — Medication 75 MILLIGRAM(S): at 12:53

## 2024-10-16 RX ADMIN — Medication 120 MILLIGRAM(S): at 06:36

## 2024-10-16 RX ADMIN — FINASTERIDE 5 MILLIGRAM(S): 5 TABLET, FILM COATED ORAL at 12:53

## 2024-10-16 RX ADMIN — PREDNISONE 10 MILLIGRAM(S): 5 TABLET ORAL at 06:36

## 2024-10-16 NOTE — CHART NOTE - NSCHARTNOTEFT_GEN_A_CORE
NUTRITION FOLLOW UP NOTE    Pt seen for length of stay f/u    SOURCE: [] Patient [x] Medical record [] RN/PCA [] Family/Caregiver []Patient unavailable [] Other:    Medical Course: 89 year old male with a PMH cognitive dysfunction (AAOx2 at baseline), advanced COPD on daily prednisone with chronic respiratory failure on 4L NC, CAD/MI, HLD, and BPH BIBEMS from [facility] for confusion x 2 days. Chest imaging suggestive of pulmonary edema vs pneumonia per chart.      Diet Prescription: Soft and bite sized, DASH/TLC    Nutrition Course: Per chart review, family wants comfort measures only. Seen during breakfast and consumed >50% of tray and consumed all of mighty shake. No GI distress w/ last bowel movement (10/13) per RN flow sheet. No edema or pressure injuries noted per RN flow sheet.    Pertinent Medications: MEDICATIONS  (STANDING):  atorvastatin 10 milliGRAM(s) Oral at bedtime  buDESOnide    Inhalation Suspension 0.5 milliGRAM(s) Inhalation every 12 hours  clopidogrel Tablet 75 milliGRAM(s) Oral daily  diltiazem    milliGRAM(s) Oral daily  enoxaparin Injectable 40 milliGRAM(s) SubCutaneous every 24 hours  finasteride 5 milliGRAM(s) Oral daily  predniSONE   Tablet 10 milliGRAM(s) Oral daily  senna 2 Tablet(s) Oral at bedtime  tamsulosin 0.4 milliGRAM(s) Oral at bedtime    MEDICATIONS  (PRN):  acetaminophen     Tablet .. 650 milliGRAM(s) Oral every 6 hours PRN Temp greater or equal to 38C (100.4F), Mild Pain (1 - 3)  albuterol/ipratropium for Nebulization 3 milliLiter(s) Nebulizer every 6 hours PRN Shortness of Breath and/or Wheezing  aluminum hydroxide/magnesium hydroxide/simethicone Suspension 30 milliLiter(s) Oral every 4 hours PRN Dyspepsia  melatonin 3 milliGRAM(s) Oral at bedtime PRN Insomnia  ondansetron Injectable 4 milliGRAM(s) IV Push every 8 hours PRN Nausea and/or Vomiting    Pertinent Labs: no new labs to assess    Weight: no new weight to assess  77.1 kg (9/26)    Estimated Needs: [x] no change since previous assessment: based on IBW 72.5 kg  Calories - 1,812-2,175 kcal (25-30 kcal/kg)  Protein - 73-94 g pro (1-1.3 g/kg)    Previous Nutrition Diagnosis: Inadequate oral intake  Nutrition Diagnosis is [ x] ongoing  [ ] resolved [ ] not applicable   New Nutrition Diagnosis: [x ] not applicable     Education:  [ ] Provided pt with verbal / written education on   [ ] Provided on previous assessment by RD  [x ] Not applicable  [ ] Pt refused     Interventions:   - continue diet as ordered  - nutrition department will provide mighty shake TID (660 kcal, 18 g pro)    Monitor & Evaluate:  PO intake, tolerance to diet/supplement, nutrition related lab values, weight trends, BMs/GI distress, hydration status, skin integrity.    Ken [place], [id number] or TEAMS

## 2024-10-16 NOTE — PROGRESS NOTE ADULT - SUBJECTIVE AND OBJECTIVE BOX
Date of Service: 10-16-24 @ 13:06    Patient is a 89y old  Male who presents with a chief complaint of acute metabolic encephalopathy (16 Oct 2024 11:06)      Any change in ROS: seems  OK:  no sob:  no cough :  no phlegm   -on 3 L of oxygen     MEDICATIONS  (STANDING):  atorvastatin 10 milliGRAM(s) Oral at bedtime  buDESOnide    Inhalation Suspension 0.5 milliGRAM(s) Inhalation every 12 hours  clopidogrel Tablet 75 milliGRAM(s) Oral daily  diltiazem    milliGRAM(s) Oral daily  enoxaparin Injectable 40 milliGRAM(s) SubCutaneous every 24 hours  finasteride 5 milliGRAM(s) Oral daily  predniSONE   Tablet 10 milliGRAM(s) Oral daily  senna 2 Tablet(s) Oral at bedtime  tamsulosin 0.4 milliGRAM(s) Oral at bedtime    MEDICATIONS  (PRN):  acetaminophen     Tablet .. 650 milliGRAM(s) Oral every 6 hours PRN Temp greater or equal to 38C (100.4F), Mild Pain (1 - 3)  albuterol/ipratropium for Nebulization 3 milliLiter(s) Nebulizer every 6 hours PRN Shortness of Breath and/or Wheezing  aluminum hydroxide/magnesium hydroxide/simethicone Suspension 30 milliLiter(s) Oral every 4 hours PRN Dyspepsia  melatonin 3 milliGRAM(s) Oral at bedtime PRN Insomnia  ondansetron Injectable 4 milliGRAM(s) IV Push every 8 hours PRN Nausea and/or Vomiting    Vital Signs Last 24 Hrs  T(C): 36.9 (16 Oct 2024 12:20), Max: 36.9 (16 Oct 2024 12:20)  T(F): 98.4 (16 Oct 2024 12:20), Max: 98.4 (16 Oct 2024 12:20)  HR: 68 (16 Oct 2024 12:20) (68 - 92)  BP: 112/55 (16 Oct 2024 12:20) (112/55 - 128/67)  BP(mean): --  RR: 18 (16 Oct 2024 12:20) (18 - 18)  SpO2: 98% (16 Oct 2024 12:20) (95% - 98%)    Parameters below as of 16 Oct 2024 12:20  Patient On (Oxygen Delivery Method): nasal cannula  O2 Flow (L/min): 3      I&O's Summary        Physical Exam:   GENERAL: NAD, well-groomed, well-developed  HEENT: SUGEY/   Atraumatic, Normocephalic  ENMT: No tonsillar erythema, exudates, or enlargement; Moist mucous membranes, Good dentition, No lesions  NECK: Supple, No JVD, Normal thyroid  CHEST/LUNG: Clear to auscultaion- no wheezing    CVS: Regular rate and rhythm; No murmurs, rubs, or gallops  GI: : Soft, Nontender, Nondistended; Bowel sounds present  NERVOUS SYSTEM:  Alert & Oriented X1  EXTREMITIES:  --r edema  LYMPH: No lymphadenopathy noted  SKIN: No rashes or lesions  ENDOCRINOLOGY: No Thyromegaly  PSYCH: dementia    Labs:                CAPILLARY BLOOD GLUCOSE                      RECENT CULTURES:        RESPIRATORY CULTURES:    rad< from: CT Chest No Cont (09.29.24 @ 13:10) >  HEART: Heart size is normal. No pericardial effusion.    VISUALIZED UPPER ABDOMEN: A 6.0 cm hepatic dome cyst. Cholecystectomy.    CHEST WALL AND BONES: Degenerative changes of the thoracic spine. A 9 mm   right thyroid lobe hypoattenuating nodule (2-39). 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    IMPRESSION:    Extensive emphysematous changes.    Bibasilar predominant peripheral reticular opacities, traction   bronchiectasis, and subpleural cystic changes, compatible with   interstitial lung disease.    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  Consider PET scan or 3 month follow up.    Small left pleural effusion with basilar pleural thickening.    Partially imaged left parotid gland mass. Consider further evaluation   with contrast-enhanced MR neck from more definitive characterization.    --- End of Report ---    < end of copied text >        Studies  Chest X-RAY  CT SCAN Chest   Venous Dopplers: LE:   CT Abdomen  Others

## 2024-10-16 NOTE — PROGRESS NOTE ADULT - NS ATTEND OPT1 GEN_ALL_CORE

## 2024-10-16 NOTE — PROGRESS NOTE ADULT - PROVIDER SPECIALTY LIST ADULT
Cardiology
Cardiology-Oncology
Electrophysiology
Hospitalist
Infectious Disease
Internal Medicine
Pulmonology
Cardiology
Electrophysiology
Hospitalist
Pulmonology
Cardiology
Electrophysiology
Hospitalist
Infectious Disease
Internal Medicine
Pulmonology
Hospitalist
Pulmonology
Hospitalist

## 2024-10-16 NOTE — PROGRESS NOTE ADULT - SUBJECTIVE AND OBJECTIVE BOX
DATE OF SERVICE: 10-16-24    No overnight events    MEDICATIONS:  acetaminophen     Tablet .. 650 milliGRAM(s) Oral every 6 hours PRN  albuterol/ipratropium for Nebulization 3 milliLiter(s) Nebulizer every 6 hours PRN  aluminum hydroxide/magnesium hydroxide/simethicone Suspension 30 milliLiter(s) Oral every 4 hours PRN  atorvastatin 10 milliGRAM(s) Oral at bedtime  buDESOnide    Inhalation Suspension 0.5 milliGRAM(s) Inhalation every 12 hours  clopidogrel Tablet 75 milliGRAM(s) Oral daily  diltiazem    milliGRAM(s) Oral daily  enoxaparin Injectable 40 milliGRAM(s) SubCutaneous every 24 hours  finasteride 5 milliGRAM(s) Oral daily  melatonin 3 milliGRAM(s) Oral at bedtime PRN  ondansetron Injectable 4 milliGRAM(s) IV Push every 8 hours PRN  predniSONE   Tablet 10 milliGRAM(s) Oral daily  senna 2 Tablet(s) Oral at bedtime  tamsulosin 0.4 milliGRAM(s) Oral at bedtime      T(C): 36.6 (10-16-24 @ 06:30), Max: 36.7 (10-16-24 @ 04:00)  HR: 84 (10-16-24 @ 06:30) (74 - 92)  BP: 124/62 (10-16-24 @ 06:30) (109/52 - 128/67)  RR: 18 (10-16-24 @ 06:30) (18 - 18)  SpO2: 95% (10-16-24 @ 06:30) (94% - 97%)  Wt(kg): --      General: NAD  Head: Normocephalic and atraumatic.   Neck: No JVD. No bruits. Supple. Does not appear to be enlarged.   Cardiovascular: + S1,S2 ; RRR Soft systolic murmur at the left lower sternal border. No rubs noted.    Lungs: CTA b/l. No rhonchi, rales or wheezes.   Abdomen: + BS, soft. Non tender. Non distended. No rebound. No guarding.   Extremities: No clubbing/cyanosis/edema.   Neurologic: Moves all four extremities. Full range of motion.   Skin: Warm and moist. The patient's skin has normal elasticity and good skin turgor.   Psychiatric: cannot eval  Musculoskeletal: Normal range of motion, normal strength    DATA    ECG:  	AF 140s    < from: TTE W or WO Ultrasound Enhancing Agent (09.26.24 @ 15:20) >     CONCLUSIONS:      1. Left ventricular systolic function is normal with an ejection fraction of 69 % by Alex's method ofdisks.   2. Enlarged right ventricular cavity size and reduced right ventricular systolic function.   3. Left atrium is normal in size.   4. The aortic valve appears trileaflet with reduced systolic excursion. There is moderate aortic stenosis. The peak transaortic velocity is 2.37 m/s, peak transaortic gradient is 22.5 mmHg and mean transaortic gradient is 11.0 mmHg with an LVOT/aortic valve VTI ratio of 0.39. The aortic valve area is estimated at 1.21 cm² by the continuity equation.   5. Estimated pulmonary artery systolic pressure is 57 mmHg, consistent with moderate pulmonary hypertension.   6. No pericardial effusion seen.  < end of copied text >      ASSESSMENT/PLAN: 	  89M with PMH cognitive dysfunction (AAOx2 at baseline), advanced COPD on daily prednisone with chronic respiratory failure on 4L NC, CAD/MI s/p stent 15 years ago, HLD, and BPH BIBEMS from [facility] for confusion in setting of suspected pneumonia. Cardiology consulted for New onset Rapid Afib /?flutter today    --TTE noted with Normal LVEF, mod AS, mod Pulm HTN  --remains in SR on cardizem CD 120mg daily  --CT head/chest noted--family does not want malignancy w/u,   --spoke with daughter she states that he is fairly bedbound over last couple months and needs help with ADLs also reports that since his mental status has been waxing and waning  she would prefer to hold of AC for now and its bleeding risk  - EP f/u appreciated  -[facility] planning to [facility]  [phone]        DATE OF SERVICE: 10-16-24    No overnight events    MEDICATIONS:  acetaminophen     Tablet .. 650 milliGRAM(s) Oral every 6 hours PRN  albuterol/ipratropium for Nebulization 3 milliLiter(s) Nebulizer every 6 hours PRN  aluminum hydroxide/magnesium hydroxide/simethicone Suspension 30 milliLiter(s) Oral every 4 hours PRN  atorvastatin 10 milliGRAM(s) Oral at bedtime  buDESOnide    Inhalation Suspension 0.5 milliGRAM(s) Inhalation every 12 hours  clopidogrel Tablet 75 milliGRAM(s) Oral daily  diltiazem    milliGRAM(s) Oral daily  enoxaparin Injectable 40 milliGRAM(s) SubCutaneous every 24 hours  finasteride 5 milliGRAM(s) Oral daily  melatonin 3 milliGRAM(s) Oral at bedtime PRN  ondansetron Injectable 4 milliGRAM(s) IV Push every 8 hours PRN  predniSONE   Tablet 10 milliGRAM(s) Oral daily  senna 2 Tablet(s) Oral at bedtime  tamsulosin 0.4 milliGRAM(s) Oral at bedtime      T(C): 36.6 (10-16-24 @ 06:30), Max: 36.7 (10-16-24 @ 04:00)  HR: 84 (10-16-24 @ 06:30) (74 - 92)  BP: 124/62 (10-16-24 @ 06:30) (109/52 - 128/67)  RR: 18 (10-16-24 @ 06:30) (18 - 18)  SpO2: 95% (10-16-24 @ 06:30) (94% - 97%)  Wt(kg): --      General: NAD  Head: Normocephalic and atraumatic.   Neck: No JVD. No bruits. Supple. Does not appear to be enlarged.   Cardiovascular: + S1,S2 ; RRR Soft systolic murmur at the left lower sternal border. No rubs noted.    Lungs: CTA b/l. No rhonchi, rales or wheezes.   Abdomen: + BS, soft. Non tender. Non distended. No rebound. No guarding.   Extremities: No clubbing/cyanosis/edema.   Neurologic: Moves all four extremities. Full range of motion.   Skin: Warm and moist. The patient's skin has normal elasticity and good skin turgor.   Psychiatric: cannot eval  Musculoskeletal: Normal range of motion, normal strength    DATA    ECG:  	AF 140s    < from: TTE W or WO Ultrasound Enhancing Agent (09.26.24 @ 15:20) >     CONCLUSIONS:      1. Left ventricular systolic function is normal with an ejection fraction of 69 % by Alex's method ofdisks.   2. Enlarged right ventricular cavity size and reduced right ventricular systolic function.   3. Left atrium is normal in size.   4. The aortic valve appears trileaflet with reduced systolic excursion. There is moderate aortic stenosis. The peak transaortic velocity is 2.37 m/s, peak transaortic gradient is 22.5 mmHg and mean transaortic gradient is 11.0 mmHg with an LVOT/aortic valve VTI ratio of 0.39. The aortic valve area is estimated at 1.21 cm² by the continuity equation.   5. Estimated pulmonary artery systolic pressure is 57 mmHg, consistent with moderate pulmonary hypertension.   6. No pericardial effusion seen.  < end of copied text >      ASSESSMENT/PLAN: 	  89M with PMH cognitive dysfunction (AAOx2 at baseline), advanced COPD on daily prednisone with chronic respiratory failure on 4L NC, CAD/MI s/p stent 15 years ago, HLD, and BPH BIBEMS from [facility] for confusion in setting of suspected pneumonia. Cardiology consulted for New onset Rapid Afib /?flutter today    -- TTE noted with Normal LVEF, mod AS, mod Pulm HTN  -- remains in SR on cardizem CD 120mg daily  -- CT head/chest noted--family does not want malignancy w/u,   -- spoke with daughter she states that he is fairly bedbound over last couple months and needs help with ADLs also reports that since his mental status has been waxing and waning  she would prefer to hold of AC for now and its bleeding risk  - EP f/u appreciated  - [facility] planning [facility]  [phone]

## 2024-10-16 NOTE — PROGRESS NOTE ADULT - NS ATTEND AMEND GEN_ALL_CORE FT
Patient seen and examined. Agree with plan as detailed in PA/NP Note.     C/w Diltiazem  per my discussion we went over risks and benefits of AC with daughter she states that he is fairly bedbound over last couple months and needs help with ADLs also reports that since his mental status has been waxing and waning  she would prefer to hold of AC for now and its bleeding risk     Hyun Cooper MD  Pager: [phone]
Patient seen and examined. Agree with plan as detailed in PA/NP Note.     CT noted, family does ot want malignancy w/u, spoke with daughter states that he is fairly bedbound over last couple months and needs help with ADLs also reports that since his mental status has been waxing and waning would prefer to hold of AC for now and its bleeding risk    Hyun Cooper MD  Pager: [phone]
Patient seen and examined. Agree with plan as detailed in PA/NP Note.     spoke with daughter she states that he is fairly bedbound over last couple months and needs help with ADLs also reports that since his mental status has been waxing and waning  she would prefer to hold of AC for now and its bleeding     Hyun Cooper MD  Pager: [phone]
Patient seen and examined. Agree with plan as detailed in PA/NP Note.     Hyun Cooper MD  Pager: [phone]
Patient seen and examined. Agree with plan as detailed in PA/NP Note.     Transition to extended release cardizem 120 mg    Hyun Cooper MD  Pager: [phone]
Patient seen and examined. Agree with plan as detailed in PA/NP Note.       No AC per family    Hyun Cooper MD  Pager: [phone]
Patient seen and examined. Agree with plan as detailed in PA/NP Note.     C/w Anurag Cooper MD  Pager: [phone]
Patient seen and examined. Agree with plan as detailed in PA/NP Note.     Hyun Cooper MD  Pager: [phone]
Patient seen and examined. Agree with plan as detailed in PA/NP Note.     Hyun Cooper MD  Pager: [phone]
no change in above plan of care, will follow with you.
no change in above plan of care, will follow with you.

## 2024-10-16 NOTE — PROGRESS NOTE ADULT - ASSESSMENT
89M with PMH cognitive dysfunction (AAOx2 at baseline), advanced COPD on daily prednisone with chronic respiratory failure on 4L NC, CAD/MI s/p stent, HLD, and BPH BIBEMS from [facility] for confusion in setting of suspected pneumonia. Pt does not know why he was brought to the hospital, unable to provide much history. Collateral obtained from daughter/HCP Meghan Gonzalez. Pt noted to have intermittent episodes of confusion by NH staff over the past 2 days, occasionally become agitated, saying nonsensical things, all unlike him. Pt is AAOx2 at baseline, would not know the year, but otherwise conversational. Whenever he was visited by daughter or son though, he was found to be at his baseline. He had a UA done that was unremarkable and a CXR that showed "pulmonary vascular congestion with patchy bibasilar infriltrates"- copy in pt's chart. And he was started on doxycycline yesterday, unclear how many doses he took. Pt with a chronic cough, did not notice anything like increased cough or sputum production from baseline. No witnessed episodes of shortness of breath, no increase in baseline oxygen requirement. No known fevers. Meghan believes patient has a history of heart failure and takes lasix regularly, however, heart failure not listed as a diagnosis from NH or [facility] outpatient pulm notes, lasix also not listed on medication list from NH or surescripts.   Pt himself currently feels well. Denies having any trouble breathing, chest pain, abdominal pain, or any other complaints.     Acute metabolic encephalopathy.   -check repeat CT head -NOT DONE YET;  WAS CANCELLED   - could be related to infection?  pneumonia,  uti etc:  being rule dout  - his VBG is fine with no retention of co2:  so thats not the reason of him getting more confused;     9/29;  -he is still confused?  baseline not sure;   -he is not wheezing;  awaiting ct chest   -heis still on 5 L:  try to bring down ; sao2 gaol is 88%; ex smoker     9/30: he is still  pretty confused;    -on chr prednisone for copd;   he is not wheezing    10/1; seems to be doing  ok ;on 4 L of oxygen :  10/2: on 4 L of oxygen ; try to minimise o x as tolerated  10/3:  called daughter : she is aware of him having malignancy probably:   -on prednisone  -daughter does not to work up for malignancy  :  10/4:  -he remains OK;  les acitated today  ;  alert and awke:   no resp distress:  no wheezing:  for malignancy as above   10/5  -he seems comfortable:   -he is on 6 L of oxygen : need to decrease:  the goal of o2 saturation is arround 885 in his copd pt: he does not need this much of oxygen    -cont BD   -he is on chr 10 mg of prednisone  -he is delirious too :   10/6:  seems OK:  still on 5 L : despite the goal of saturation is 88%: had dw acp yesterday:  I believe the oxygen can still be lowered to 2 L per minute and further  cont BD   10/8:  -he is on baseline of home oxygen requirement at 4 L per minute:   he is not agitated and is not in any resp distress:  cont current therapy:   10/9: seems comfortable;  on 3 L of oxygen : shei s alert and of confused;   10/10:  -seems OK: on 2-3 L of oxygen :  no wheezing:  no overnight events   10/11;  -on 4 L of oxygen ; she is doing  ok : no sob:  no coguh : no phlegm    -no sob:  no wheezing:  cont current rx:   10/12;  her mentals state remains the same;   no sob:  no cough ; no phlegm    -on 4 L of oxygen :  no wheezing:  no overnight events   10/13:  seems same;  alert and awake and confused:  on 3 L of oxygen   -no resp distress:  no wheezing   seems comfortable pulm wise   10/14:  seems to be doing  ok : no sob;  on 3 L of oxygen ; no resp distress:   10/16: seems stable:  calm :  and not agitated       Mild leukocytosis to 12k on admission  - infectious vs reactive. afebrile, not septic, hemodynamically stable.   -Without any new fevers,   -URI sxs, worsening of chronic cough, or increased O2 requirements  - Procal negative.  - cw abx as per ID  ; to cont for now;   -needs ct chest     9/29; -on no antibiotics   -afebrile today  ;  wbc is normal today     9/30: remains afebrile and wbc is normal   10/1: seems OK:   10/2: cont current care:   10/4:  leucocytosis resolved;   10/5:l resolved   10/6  seems eryn :  no sob:  on 5 L of oxygen    10/7;  -today on 4 L:  still satting good;  100%  10/8: cont 4 L of oxygen : no sob:  no cough :   10/9; cont current meds  10/10: Last wbc count on 6th was normal   10/11: no fever:    10/163: WBC is normal    10/14:  ; no blood draws in last many days  ; await authorization  10/15: encephalopathy almost resolved:  seems back to baseline  10/16: seems stable:  no sob:       Advanced COPD.   -ow suspicion for COPD exacerbation, lung exam with localized rhonchi/coarse crackles over R>L base. No increased baseline O2 requirement or worsening of chronic cough  - continue home prednisone 10mg daily   - duonebs PRN.  -and his VBg also did not show any  significant retention ofpco2:     9/30:  -cont current rx:  he seems stable:   -ct chest showed; Extensive emphysematous changes. Bibasilar predominant peripheral reticular opacities, traction bronchiectasis, and subpleural cystic changes, compatible with   interstitial lung disease. Right lung pulmonary nodules, measuring up to 1.8 cm, concerning for neoplasia.  Consider PET scan or 3 month follow up. Small left pleural effusion with basilar pleural thickening. Partially imaged left parotid gland mass. Consider further evaluation with contrast-enhanced MR neck from more definitive characterization.    10/1:  dw daughter : she will decide about the pulm nodule:  and has parotid gland tumor ; work up per primary team     10/3: no workup per daughter   10/4:  pulm wise he seems stable:  no sob:  no wheezing   10/5: need to decrease the oxygen as documented above   10/6:  no wheezing:   no co2 retention  cont BD   10/7;  cont BD:  no wheezing:    10/8:  -remains stable:  pulm wise:   -he likely has malignant nodule:  daughter is aware:  probably would not do any further workup : but can repeat his ct scan chest in 6 weeks time   10/11: cont BD   10/12:  seems OK:  no sob:  no cough ; no phlegm    10/13: cont current care:   10/14: clinically he looks OK;  no sob:  he seems more oriented to me today   10/15: he has copd 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   ·  Plan: - continue home NC at 4L NC, goal 88-92&-likely due to copd   10/2; seems to be doing  ok :   10/03: cont 4 L of NC :  he is not wheezing:  vbg ordered   10/04;-VBG is very good:   10/5: VBG reasonable  no co2 retention:  reduce oxygen    10/6 : try to decrease oxygen down to 1 L  per minute or even off   10/7; cont to wean off oxygen    10/8: he is on baseline of 4 L of oxygen    10/9: cont wean down oxygen as possible  10/10:  she is still on 3-4 L of oxygen    10/12: on 4 L of nasal cannula    10/13: on 3 L of oxygen : can try to decrease in AM   10/14; cont o2;  try to wean : goal of o2 sao2 is 88%  10/15: on 3 L of oxygen : seems OK;   10/16: seems stable:  no further increasing oxygen requirement     CAD (coronary artery disease).    - continue home statin, plavix.    -NEW aflutter   -echo showed;  1. Left ventricular systolic function is normal with an ejection fraction of 69 % by Alex's method of disks.   2. Enlarged right ventricular cavity size and reduced right ventricular systolic function.   3. Left atrium is normal in size.   4. The aortic valve appears trileaflet with reduced systolic excursion. There is moderate aortic stenosis. The peak transaortic velocity is 2.37 m/s, peak transaortic gradient is 22.5 mmHg and mean transaortic gradient is 11.0 mmHg with an LVOT/aortic valve VTI ratio of 0.39. The aortic valve area is estimated at 1.21 cm² by the continuity equation.   5. Estimated pulmonary artery systolic pressure is 57 mmHg, consistent with moderate pulmonary hypertension.  defer to cards     10/5L converted back to NSR : daughter refused for AC:        dw acp

## 2024-10-16 NOTE — PROGRESS NOTE ADULT - REASON FOR ADMISSION
acute metabolic encephalopathy